# Patient Record
Sex: FEMALE | Race: WHITE | NOT HISPANIC OR LATINO | Employment: OTHER | URBAN - METROPOLITAN AREA
[De-identification: names, ages, dates, MRNs, and addresses within clinical notes are randomized per-mention and may not be internally consistent; named-entity substitution may affect disease eponyms.]

---

## 2017-02-01 ENCOUNTER — HOSPITAL ENCOUNTER (OUTPATIENT)
Dept: ULTRASOUND IMAGING | Facility: HOSPITAL | Age: 82
Discharge: HOME | End: 2017-02-01
Admitting: FAMILY MEDICINE

## 2017-02-01 DIAGNOSIS — R74.01 ELEVATED TRANSAMINASE LEVEL: ICD-10-CM

## 2017-02-01 PROCEDURE — 76705 ECHO EXAM OF ABDOMEN: CPT

## 2017-02-07 RX ORDER — LISINOPRIL 40 MG/1
40 TABLET ORAL DAILY
Qty: 90 TABLET | Refills: 1 | Status: SHIPPED | OUTPATIENT
Start: 2017-02-07 | End: 2017-08-21 | Stop reason: SDUPTHER

## 2017-02-16 RX ORDER — TOLTERODINE TARTRATE 1 MG/1
TABLET, EXTENDED RELEASE ORAL
Qty: 60 TABLET | Refills: 0 | Status: SHIPPED | OUTPATIENT
Start: 2017-02-16 | End: 2018-04-09

## 2017-03-21 RX ORDER — OMEPRAZOLE 20 MG/1
20 CAPSULE, DELAYED RELEASE ORAL DAILY
Qty: 90 CAPSULE | Refills: 3 | Status: SHIPPED | OUTPATIENT
Start: 2017-03-21 | End: 2018-03-03 | Stop reason: SDUPTHER

## 2017-06-14 RX ORDER — TOLTERODINE TARTRATE 1 MG/1
TABLET, EXTENDED RELEASE ORAL
Qty: 60 TABLET | Refills: 0 | OUTPATIENT
Start: 2017-06-14

## 2017-06-21 RX ORDER — ATORVASTATIN CALCIUM 80 MG/1
TABLET, FILM COATED ORAL
Qty: 90 TABLET | Refills: 0 | Status: SHIPPED | OUTPATIENT
Start: 2017-06-21 | End: 2017-08-30 | Stop reason: SDUPTHER

## 2017-08-22 RX ORDER — LISINOPRIL 40 MG/1
TABLET ORAL
Qty: 30 TABLET | Refills: 0 | Status: SHIPPED | OUTPATIENT
Start: 2017-08-22 | End: 2017-08-30 | Stop reason: SDUPTHER

## 2017-08-22 RX ORDER — TOLTERODINE TARTRATE 1 MG/1
TABLET, EXTENDED RELEASE ORAL
Qty: 60 TABLET | Refills: 0 | Status: SHIPPED | OUTPATIENT
Start: 2017-08-22 | End: 2017-08-30 | Stop reason: SDUPTHER

## 2017-08-22 NOTE — TELEPHONE ENCOUNTER
PN that we can send in 1 month supply so she can have enough to last to her appointment.  I see that she has cancelled and no show several appointments.  She said she will be here for her next appointment.  Script sent for lisinopril and tolterodine

## 2017-08-30 ENCOUNTER — OFFICE VISIT (OUTPATIENT)
Dept: INTERNAL MEDICINE | Facility: CLINIC | Age: 82
End: 2017-08-30

## 2017-08-30 VITALS
HEART RATE: 57 BPM | SYSTOLIC BLOOD PRESSURE: 136 MMHG | HEIGHT: 62 IN | WEIGHT: 147.2 LBS | BODY MASS INDEX: 27.09 KG/M2 | TEMPERATURE: 96.6 F | OXYGEN SATURATION: 95 % | DIASTOLIC BLOOD PRESSURE: 74 MMHG

## 2017-08-30 DIAGNOSIS — R39.11 HESITANCY OF MICTURITION: ICD-10-CM

## 2017-08-30 DIAGNOSIS — N30.01 ACUTE CYSTITIS WITH HEMATURIA: ICD-10-CM

## 2017-08-30 DIAGNOSIS — R82.90 ABNORMAL URINE: ICD-10-CM

## 2017-08-30 DIAGNOSIS — I10 BENIGN ESSENTIAL HTN: ICD-10-CM

## 2017-08-30 DIAGNOSIS — R73.09 ABNORMAL GLUCOSE: ICD-10-CM

## 2017-08-30 DIAGNOSIS — E78.5 HYPERLIPIDEMIA, UNSPECIFIED HYPERLIPIDEMIA TYPE: Primary | ICD-10-CM

## 2017-08-30 LAB
ALBUMIN SERPL-MCNC: 3.9 G/DL (ref 3.2–4.8)
ALBUMIN/GLOB SERPL: 1.6 G/DL (ref 1.5–2.5)
ALP SERPL-CCNC: 146 U/L (ref 25–100)
ALT SERPL W P-5'-P-CCNC: 23 U/L (ref 7–40)
ANION GAP SERPL CALCULATED.3IONS-SCNC: 15 MMOL/L (ref 3–11)
ARTICHOKE IGE QN: 76 MG/DL (ref 0–130)
AST SERPL-CCNC: 34 U/L (ref 0–33)
BILIRUB BLD-MCNC: NEGATIVE MG/DL
BILIRUB SERPL-MCNC: 0.2 MG/DL (ref 0.3–1.2)
BUN BLD-MCNC: 15 MG/DL (ref 9–23)
BUN/CREAT SERPL: 15 (ref 7–25)
CALCIUM SPEC-SCNC: 8.9 MG/DL (ref 8.7–10.4)
CHLORIDE SERPL-SCNC: 109 MMOL/L (ref 99–109)
CHOLEST SERPL-MCNC: 171 MG/DL (ref 0–200)
CLARITY, POC: ABNORMAL
CO2 SERPL-SCNC: 18 MMOL/L (ref 20–31)
COLOR UR: YELLOW
CREAT BLD-MCNC: 1 MG/DL (ref 0.6–1.3)
GFR SERPL CREATININE-BSD FRML MDRD: 53 ML/MIN/1.73
GLOBULIN UR ELPH-MCNC: 2.5 GM/DL
GLUCOSE BLD-MCNC: 92 MG/DL (ref 70–100)
GLUCOSE UR STRIP-MCNC: NEGATIVE MG/DL
HBA1C MFR BLD: 5.7 % (ref 4.8–5.6)
HDLC SERPL-MCNC: 80 MG/DL (ref 40–60)
KETONES UR QL: NEGATIVE
LEUKOCYTE EST, POC: ABNORMAL
NITRITE UR-MCNC: NEGATIVE MG/ML
PH UR: 6 [PH] (ref 5–8)
POTASSIUM BLD-SCNC: 4.1 MMOL/L (ref 3.5–5.5)
PROT SERPL-MCNC: 6.4 G/DL (ref 5.7–8.2)
PROT UR STRIP-MCNC: ABNORMAL MG/DL
RBC # UR STRIP: ABNORMAL /UL
SODIUM BLD-SCNC: 142 MMOL/L (ref 132–146)
SP GR UR: 1.02 (ref 1–1.03)
TRIGL SERPL-MCNC: 85 MG/DL (ref 0–150)
UROBILINOGEN UR QL: NORMAL

## 2017-08-30 PROCEDURE — 87086 URINE CULTURE/COLONY COUNT: CPT | Performed by: FAMILY MEDICINE

## 2017-08-30 PROCEDURE — 87186 SC STD MICRODIL/AGAR DIL: CPT | Performed by: FAMILY MEDICINE

## 2017-08-30 PROCEDURE — 80061 LIPID PANEL: CPT | Performed by: FAMILY MEDICINE

## 2017-08-30 PROCEDURE — 99214 OFFICE O/P EST MOD 30 MIN: CPT | Performed by: FAMILY MEDICINE

## 2017-08-30 PROCEDURE — 87077 CULTURE AEROBIC IDENTIFY: CPT | Performed by: FAMILY MEDICINE

## 2017-08-30 PROCEDURE — 83036 HEMOGLOBIN GLYCOSYLATED A1C: CPT | Performed by: FAMILY MEDICINE

## 2017-08-30 PROCEDURE — 81003 URINALYSIS AUTO W/O SCOPE: CPT | Performed by: FAMILY MEDICINE

## 2017-08-30 PROCEDURE — 80053 COMPREHEN METABOLIC PANEL: CPT | Performed by: FAMILY MEDICINE

## 2017-08-30 RX ORDER — DOXYCYCLINE 100 MG/1
100 CAPSULE ORAL 2 TIMES DAILY
Qty: 20 CAPSULE | Refills: 0 | Status: SHIPPED | OUTPATIENT
Start: 2017-08-30 | End: 2018-04-09

## 2017-08-30 RX ORDER — LISINOPRIL 40 MG/1
40 TABLET ORAL DAILY
Qty: 90 TABLET | Refills: 1 | Status: SHIPPED | OUTPATIENT
Start: 2017-08-30 | End: 2018-04-09 | Stop reason: SDUPTHER

## 2017-08-30 RX ORDER — ATORVASTATIN CALCIUM 80 MG/1
80 TABLET, FILM COATED ORAL NIGHTLY
Qty: 90 TABLET | Refills: 0 | Status: SHIPPED | OUTPATIENT
Start: 2017-08-30 | End: 2018-02-06 | Stop reason: SDUPTHER

## 2017-08-30 RX ORDER — HYDROCODONE BITARTRATE AND ACETAMINOPHEN 10; 325 MG/1; MG/1
TABLET ORAL
Refills: 0 | COMMUNITY
Start: 2017-08-20 | End: 2021-08-18

## 2017-08-30 RX ORDER — PHENAZOPYRIDINE HYDROCHLORIDE 100 MG/1
100 TABLET, FILM COATED ORAL AS NEEDED
COMMUNITY
Start: 2017-04-28 | End: 2018-04-09

## 2017-08-30 NOTE — PATIENT INSTRUCTIONS
Botulinum Toxin Bladder Injection  A botulinum toxin bladder injection is a procedure to treat an overactive bladder. During the procedure, a drug called botulinum toxin is injected into the bladder through a long, thin needle. This drug relaxes the bladder muscles and reduces overactivity.  You may need this procedure if your medicines are not working or you cannot take them. The procedure may be repeated as needed.  LET YOUR HEALTH CARE PROVIDER KNOW ABOUT:  · Any allergies you have.  · All medicines you are taking, including vitamins, herbs, eye drops, creams, and over-the-counter medicines.  · Previous problems you or members of your family have had with the use of anesthetics.  · Any blood disorders you have.  · Previous surgeries you have had.  · Other medical conditions you have.  · Any previous reactions to a botulinum toxin injection.  · Any symptoms of urinary tract infection. These include chills, fever, a burning feeling when passing urine, and needing to pass urine often.  RISKS AND COMPLICATIONS  Generally this is a safe procedure. However, problems may occur, including:  · Not being able to pass urine. If this happens, you may need to have your bladder emptied with a thin tube (bladder catheter).  · Bleeding.  · Urinary tract infection.  · Allergic reaction to the botulinum toxin.  · Pain or burning when passing urine.  · Damage to other structures or organs.  BEFORE THE PROCEDURE  · Ask your health care provider about:    Changing or stopping your regular medicines. This is especially important if you are taking diabetes medicines or blood thinners.    Taking medicines such as aspirin and ibuprofen. These medicines can thin your blood. Do not take these medicines before your procedure if your health care provider instructs you not to.  · Follow instructions from your health care provider about eating or drinking restrictions.  · You may be given antibiotic medicine to help prevent infection.  · Plan  to have someone take you home after the procedure.  PROCEDURE  · You will be asked to empty your bladder.  · To reduce your risk of infection:    Your health care team will wash or sanitize their hands.    Your skin will be washed with soap.  · An IV tube will be inserted into one of your veins.  · You will be given one or more of the following:    A medicine to help you relax (sedative).    A medicine to numb the area (local anesthetic).    A medicine to make you fall asleep (general anesthetic).  · A long, thin scope called a cystoscope will be passed into your bladder through the tube that carries urine from your bladder (urethra).  · The cystoscope will be used to fill your bladder with water.  · A long needle will be passed through the cystoscope and into the bladder.  · The botulinum toxin will be injected into your bladder. It may be injected into multiple areas of your bladder.  · Your bladder will be emptied, and the cystoscope will be removed.  The procedure may vary among health care providers and hospitals.  AFTER THE PROCEDURE  · Your blood pressure, heart rate, breathing rate, and blood oxygen level will be monitored often until the medicines you were given have worn off.  · Do not drive for 24 hours if you received a sedative.     This information is not intended to replace advice given to you by your health care provider. Make sure you discuss any questions you have with your health care provider.     Document Released: 09/07/2016 Document Reviewed: 09/07/2016  [employer] Interactive Patient Education ©2017 [employer]  Conjugated Estrogens vaginal cream  What is this medicine?  CONJUGATED ESTROGENS (CON ju gate ed CYNDY noel) are a mixture of female hormones. This cream can help relieve symptoms associated with menopause.like vaginal dryness and irritation.  This medicine may be used for other purposes; ask your health care provider or pharmacist if you have questions.  COMMON BRAND NAME(S):  Premarin  What should I tell my health care provider before I take this medicine?  They need to know if you have any of these conditions:  -abnormal vaginal bleeding  -blood vessel disease or blood clots  -breast, cervical, endometrial, or uterine cancer  -dementia  -diabetes  -gallbladder disease  -heart disease or recent heart attack  -high blood pressure  -high cholesterol  -high level of calcium in the blood  -hysterectomy  -kidney disease  -liver disease  -migraine headaches  -protein C deficiency  -protein S deficiency  -stroke  -systemic lupus erythematosus (SLE)  -tobacco smoker  -an unusual or allergic reaction to estrogens other medicines, foods, dyes, or preservatives  -pregnant or trying to get pregnant  -breast-feeding  How should I use this medicine?  This medicine is for use in the vagina only. Do not take by mouth. Follow the directions on the prescription label. Use at bedtime unless otherwise directed by your doctor or health care professional. Use the special applicator supplied with the cream. Wash hands before and after use. Fill the applicator with the cream and remove from the tube. Lie on your back, part and bend your knees. Insert the applicator into the vagina and push the plunger to expel the cream into the vagina. Wash the applicator with warm soapy water and rinse well. Use exactly as directed for the complete length of time prescribed. Do not stop using except on the advice of your doctor or health care professional.  Talk to your pediatrician regarding the use of this medicine in children. Special care may be needed.  A patient package insert for the product will be given with each prescription and refill. Read this sheet carefully each time. The sheet may change frequently.  Overdosage: If you think you have taken too much of this medicine contact a poison control center or emergency room at once.  NOTE: This medicine is only for you. Do not share this medicine with others.  What if I  miss a dose?  If you miss a dose, use it as soon as you can. If it is almost time for your next dose, use only that dose. Do not use double or extra doses.  What may interact with this medicine?  Do not take this medicine with any of the following medications:  -aromatase inhibitors like aminoglutethimide, anastrozole, exemestane, letrozole, testolactone  This medicine may also interact with the following medications:  -barbiturates used for inducing sleep or treating seizures  -carbamazepine  -grapefruit juice  -medicines for fungal infections like itraconazole and ketoconazole  -raloxifene or tamoxifen  -rifabutin  -rifampin  -rifapentine  -ritonavir  -some antibiotics used to treat infections  -Airport's Wort  -warfarin  This list may not describe all possible interactions. Give your health care provider a list of all the medicines, herbs, non-prescription drugs, or dietary supplements you use. Also tell them if you smoke, drink alcohol, or use illegal drugs. Some items may interact with your medicine.  What should I watch for while using this medicine?  Visit your health care professional for regular checks on your progress. You will need a regular breast and pelvic exam. You should also discuss the need for regular mammograms with your health care professional, and follow his or her guidelines.  This medicine can make your body retain fluid, making your fingers, hands, or ankles swell. Your blood pressure can go up. Contact your doctor or health care professional if you feel you are retaining fluid.  If you have any reason to think you are pregnant; stop taking this medicine at once and contact your doctor or health care professional.  Tobacco smoking increases the risk of getting a blood clot or having a stroke, especially if you are more than 35 years old. You are strongly advised not to smoke.  If you wear contact lenses and notice visual changes, or if the lenses begin to feel uncomfortable, consult your  eye care specialist.  If you are going to have elective surgery, you may need to stop taking this medicine beforehand. Consult your health care professional for advice prior to scheduling the surgery.  What side effects may I notice from receiving this medicine?  Side effects that you should report to your doctor or health care professional as soon as possible:  -allergic reactions like skin rash, itching or hives, swelling of the face, lips, or tongue  -breast tissue changes or discharge  -changes in vision  -chest pain  -confusion, trouble speaking or understanding  -dark urine  -general ill feeling or flu-like symptoms  -light-colored stools  -nausea, vomiting  -pain, swelling, warmth in the leg  -right upper belly pain  -severe headaches  -shortness of breath  -sudden numbness or weakness of the face, arm or leg  -trouble walking, dizziness, loss of balance or coordination  -unusual vaginal bleeding  -yellowing of the eyes or skin  Side effects that usually do not require medical attention (report to your doctor or health care professional if they continue or are bothersome):  -hair loss  -increased hunger or thirst  -increased urination  -symptoms of vaginal infection like itching, irritation or unusual discharge  -unusually weak or tired  This list may not describe all possible side effects. Call your doctor for medical advice about side effects. You may report side effects to FDA at 8-450-FDA-8034.  Where should I keep my medicine?  Keep out of the reach of children.  Store at room temperature between 15 and 30 degrees C (59 and 86 degrees F). Throw away any unused medicine after the expiration date.  NOTE: This sheet is a summary. It may not cover all possible information. If you have questions about this medicine, talk to your doctor, pharmacist, or health care provider.     © 2017, [employer]. (2012-03-21 09:20:36)    Urinary Tract Infection, Adult  A urinary tract infection (UTI) is an  infection of any part of the urinary tract, which includes the kidneys, ureters, bladder, and urethra. These organs make, store, and get rid of urine in the body. UTI can be a bladder infection (cystitis) or kidney infection (pyelonephritis).  CAUSES  This infection may be caused by fungi, viruses, or bacteria. Bacteria are the most common cause of UTIs. This condition can also be caused by repeated incomplete emptying of the bladder during urination.   RISK FACTORS  This condition is more likely to develop if:   · You ignore your need to urinate or hold urine for long periods of time.  · You do not empty your bladder completely during urination.  · You wipe back to front after urinating or having a bowel movement, if you are female.  · You are uncircumcised, if you are male.    · You are constipated.  · You have a urinary catheter that stays in place (indwelling).  · You have a weak defense (immune) system.  · You have a medical condition that affects your bowels, kidneys, or bladder.  · You have diabetes.  · You take antibiotic medicines frequently or for long periods of time, and the antibiotics no longer work well against certain types of infections (antibiotic resistance).  · You take medicines that irritate your urinary tract.  · You are exposed to chemicals that irritate your urinary tract.  · You are female.  SYMPTOMS   Symptoms of this condition include:   · Fever.    · Frequent urination or passing small amounts of urine frequently.    · Needing to urinate urgently.    · Pain or burning with urination.    · Urine that smells bad or unusual.    · Cloudy urine.    · Pain in the lower abdomen or back.    · Trouble urinating.    · Blood in the urine.    · Vomiting or being less hungry than normal.     · Diarrhea or abdominal pain.    · Vaginal discharge, if you are female.  DIAGNOSIS  This condition is diagnosed with a medical history and physical exam. You will also need to provide a urine sample to test your  urine. Other tests may be done, including:    · Blood tests.    · Sexually transmitted disease (STD) testing.     If you have had more than one UTI, a cystoscopy or imaging studies may be done to determine the cause of the infections.   TREATMENT   Treatment for this condition often includes a combination of two or more of the following:   · Antibiotic medicine.    · Other medicines to treat less common causes of UTI.    · Over-the-counter medicines to treat pain.    · Drinking enough water to stay hydrated.  HOME CARE INSTRUCTIONS  · Take over-the-counter and prescription medicines only as told by your health care provider.  · If you were prescribed an antibiotic, take it as told by your health care provider. Do not stop taking the antibiotic even if you start to feel better.  · Avoid alcohol, caffeine, tea, and carbonated beverages. They can irritate your bladder.  · Drink enough fluid to keep your urine clear or pale yellow.  · Keep all follow-up visits as told by your health care provider. This is important.  · Make sure to:    Empty your bladder often and completely. Do not hold urine for long periods of time.    Empty your bladder before and after sex.    Wipe from front to back after a bowel movement if you are female. Use each tissue one time when you wipe.  SEEK MEDICAL CARE IF:   · You have back pain.    · You have a fever.  · You feel nauseous or vomit.    · Your symptoms do not get better after 3 days.     · Your symptoms go away and then return.  SEEK IMMEDIATE MEDICAL CARE IF:   · You have severe back pain or lower abdominal pain.    · You are vomiting and cannot keep down any medicines or water.     This information is not intended to replace advice given to you by your health care provider. Make sure you discuss any questions you have with your health care provider.     Document Released: 09/27/2006 Document Revised: 04/10/2017 Document Reviewed: 11/07/2016  [email or web address] Interactive Patient Education  ©2017 [employer]

## 2017-08-30 NOTE — PROGRESS NOTES
"Subjective   Sarah Zendejas is a 85 y.o. female.     Chief Complaint   Patient presents with   • follow up visit 6 months     had been to gyno/urology - last visit 1 month prior   • Urinary Incontinence     tried botox and didn't work; patient is very frustrated with it; She is still using cream and wants to know next step       Visit Vitals   • /74   • Pulse 57   • Temp 96.6 °F (35.9 °C)   • Ht 61.5\" (156.2 cm)   • Wt 147 lb 3.2 oz (66.8 kg)   • LMP  (LMP Unknown)   • SpO2 95%   • BMI 27.36 kg/m2       Hypertension   This is a chronic problem. The current episode started more than 1 year ago. The problem is unchanged. The problem is controlled (pt gets nervous going to Specialist and BP will go to 200. ). Pertinent negatives include no anxiety, blurred vision, chest pain, headaches, malaise/fatigue, neck pain, orthopnea, palpitations, peripheral edema, PND, shortness of breath or sweats. Past treatments include ACE inhibitors. The current treatment provides significant improvement. There are no compliance problems.  There is no history of angina, kidney disease, CAD/MI, CVA, heart failure, left ventricular hypertrophy, PVD, retinopathy or a thyroid problem. There is no history of chronic renal disease or sleep apnea.   Hyperlipidemia   This is a chronic problem. The current episode started more than 1 year ago. The problem is controlled. Recent lipid tests were reviewed and are normal. Exacerbating diseases include liver disease. She has no history of chronic renal disease, diabetes, hypothyroidism, obesity or nephrotic syndrome. There are no known factors aggravating her hyperlipidemia. Associated symptoms include leg pain. Pertinent negatives include no chest pain, focal sensory loss, focal weakness, myalgias or shortness of breath. Current antihyperlipidemic treatment includes statins. The current treatment provides significant improvement of lipids. Risk factors for coronary artery disease include " dyslipidemia, hypertension and post-menopausal.      Pt had botox in Urethra and cysto 6 months ago. Pt still having problems relaxing urethra.  Pt has questions about premarin.     The following portions of the patient's history were reviewed and updated as appropriate: allergies, current medications, past family history, past medical history, past social history, past surgical history and problem list.    Past Medical History:   Diagnosis Date   • Allergic rhinitis    • Anemia    • Bipolar 1 disorder    • Broken bones     cheek bone   • Bursitis of knee    • Calculus of bile duct    • Chronic kidney disease     STAGE 3   • Colon polyps    • Depression    • Dysuria    • Fundic gland polyposis of stomach 08/2014   • Gallstones 07/2014    gb sono-stone 7/14   • GERD (gastroesophageal reflux disease)    • H/O complete eye exam 2015   • H/O mammogram 2013   • History of dental examination 06/2016   • Hyperlipidemia    • Hypertension    • Infectious viral hepatitis    • Non-alcoholic fatty liver disease    • Polyp of stomach    • Spinal stenosis    • UTI (urinary tract infection)        Past Surgical History:   Procedure Laterality Date   • BILATERAL BREAST REDUCTION     • CARDIAC CATHETERIZATION     • COLON SURGERY  08/2014    TUBULAR ADENOMA RESECTED   • COLONOSCOPY  2013   • ENDOSCOPY  08/2014    (Dr Huffman)   • ESOPHAGEAL DILATATION     • OTHER SURGICAL HISTORY  08/2014    bx stomach polyps and colon polyps       Allergies   Allergen Reactions   • Morphine Other (See Comments)     Mood changes   • Nsaids    • Sulfa Antibiotics    • Vancomycin Rash       Family History   Problem Relation Age of Onset   • Colon cancer Mother    • Cancer Mother    • Stroke Father    • Arthritis Father    • Hypertension Father    • Ovarian cancer Sister    • Heart disease Sister    • Cancer Sister    • Diabetes Brother    • Arthritis Brother    • Hypertension Brother    • Colon cancer Sister    • Cancer Sister    • Adrenal disorder  Other    • Breast cancer Other    • Brain cancer Other        Social History     Social History   • Marital status: Single     Spouse name: N/A   • Number of children: N/A   • Years of education: N/A     Occupational History   • Not on file.     Social History Main Topics   • Smoking status: Former Smoker   • Smokeless tobacco: Never Used   • Alcohol use No   • Drug use: No   • Sexual activity: Not on file     Other Topics Concern   • Not on file     Social History Narrative       Review of Systems   Constitutional: Negative.  Negative for chills, diaphoresis, fatigue, fever and malaise/fatigue.   HENT: Negative.  Negative for ear pain, nosebleeds, postnasal drip, rhinorrhea, sinus pressure, sneezing and sore throat.    Eyes: Negative.  Negative for blurred vision, redness and itching.   Respiratory: Negative.  Negative for cough, shortness of breath and wheezing.    Cardiovascular: Negative.  Negative for chest pain, palpitations, orthopnea and PND.   Gastrointestinal: Negative.  Negative for abdominal pain, constipation, diarrhea, nausea and vomiting.   Endocrine: Positive for polydipsia. Negative for cold intolerance and heat intolerance.   Genitourinary: Positive for difficulty urinating and enuresis. Negative for dysuria, frequency, hematuria and urgency.   Musculoskeletal: Negative.  Negative for arthralgias, back pain, myalgias and neck pain.   Skin: Negative.  Negative for color change and rash.   Allergic/Immunologic: Negative.  Negative for environmental allergies.   Neurological: Negative.  Negative for dizziness, focal weakness, syncope, light-headedness and headaches.   Hematological: Negative.  Negative for adenopathy. Does not bruise/bleed easily.   Psychiatric/Behavioral: Negative.  Negative for dysphoric mood. The patient is not nervous/anxious.        Objective   Physical Exam   Constitutional: She is oriented to person, place, and time. She appears well-developed.   HENT:   Head: Normocephalic.    Right Ear: External ear normal.   Left Ear: External ear normal.   Nose: Nose normal.   Mouth/Throat: Oropharynx is clear and moist.   Eyes: Conjunctivae and EOM are normal. Pupils are equal, round, and reactive to light.   Neck: Normal range of motion. Neck supple.   Cardiovascular: Normal rate and regular rhythm.    No murmur heard.  Pulmonary/Chest: Effort normal and breath sounds normal. No respiratory distress. She has no wheezes. She has no rales. She exhibits no tenderness.   Abdominal: Soft. Bowel sounds are normal. There is no tenderness.   Musculoskeletal: Normal range of motion.   Neurological: She is alert and oriented to person, place, and time.   Skin: Skin is warm and dry.   Psychiatric: She has a normal mood and affect. Her behavior is normal.   Nursing note and vitals reviewed.      Assessment/Plan   Sarah was seen today for follow up visit 6 months and urinary incontinence.    Diagnoses and all orders for this visit:    Hyperlipidemia, unspecified hyperlipidemia type  -     atorvastatin (LIPITOR) 80 MG tablet; Take 1 tablet by mouth Every Night.  -     Comprehensive Metabolic Panel  -     Lipid Panel    Benign essential HTN  -     lisinopril (PRINIVIL,ZESTRIL) 40 MG tablet; Take 1 tablet by mouth Daily.  -     Comprehensive Metabolic Panel  -     Lipid Panel    Abnormal urine  -     POC Urinalysis Dipstick, Automated  -     Urine Culture    Hesitancy of micturition    Abnormal glucose  -     Hemoglobin A1c    Acute cystitis with hematuria  -     Urinalysis w/Culture if Indicated; Future    Other orders  -     doxycycline (MONODOX) 100 MG capsule; Take 1 capsule by mouth 2 (Two) Times a Day.      Recheck urine 2 weeks or sooner as needed  Order for repeat urine given to pt that she can due in the [place] area  Handout on bladder botox and premarin topical use.            Current Outpatient Prescriptions:   •  ALPRAZolam (XANAX) 1 MG tablet, Take 1 mg by mouth 2 (two) times a day as needed for  anxiety., Disp: , Rfl:   •  atorvastatin (LIPITOR) 80 MG tablet, Take 1 tablet by mouth Every Night., Disp: 90 tablet, Rfl: 0  •  calcitriol (ROCALTROL) 0.25 MCG capsule, TK 1 C PO D, Disp: , Rfl: 0  •  Calcium Carbonate (CALCIUM 600 PO), Take  by mouth 2 (Two) Times a Day., Disp: , Rfl:   •  cloNIDine (CATAPRES) 0.1 MG tablet, Take 0.1 mg by mouth 2 (two) times a day., Disp: , Rfl:   •  conjugated estrogens (PREMARIN) 0.625 MG/GM vaginal cream, Insert  into the vagina Daily., Disp: , Rfl:   •  CRANBERRY PO, Take  by mouth., Disp: , Rfl:   •  Ergocalciferol (VITAMIN D2 PO), Take  by mouth., Disp: , Rfl:   •  FLUoxetine (PROzac) 40 MG capsule, Take 40 mg by mouth daily., Disp: , Rfl:   •  fluticasone (FLONASE) 50 MCG/ACT nasal spray, 2 sprays into each nostril daily. Administer 2 sprays in each nostril for each dose., Disp: , Rfl:   •  HYDROcodone-acetaminophen (NORCO)  MG per tablet, TK 1/2 TO 1 T PO BID PRN P, Disp: , Rfl: 0  •  lisinopril (PRINIVIL,ZESTRIL) 40 MG tablet, Take 1 tablet by mouth Daily., Disp: 90 tablet, Rfl: 1  •  omeprazole (priLOSEC) 20 MG capsule, Take 1 capsule by mouth Daily., Disp: 90 capsule, Rfl: 3  •  phenazopyridine (PYRIDIUM) 100 MG tablet, Take 100 mg by mouth As Needed. For UTI, Disp: , Rfl:   •  tolterodine (DETROL) 1 MG tablet, TAKE 1 TABLET BY MOUTH TWICE DAILY, Disp: 60 tablet, Rfl: 0  •  topiramate (TOPAMAX) 50 MG tablet, Take 50 mg by mouth Daily. 3 tablets at night times, Disp: , Rfl:   •  vitamin D (ERGOCALCIFEROL) 17577 UNITS capsule capsule, TK ONE C PO  Q MONTH, Disp: , Rfl: 0  •  doxycycline (MONODOX) 100 MG capsule, Take 1 capsule by mouth 2 (Two) Times a Day., Disp: 20 capsule, Rfl: 0    Return in about 6 months (around 2/28/2018), or if symptoms worsen or fail to improve, for Recheck.    Recent Results (from the past 168 hour(s))   POC Urinalysis Dipstick, Automated    Collection Time: 08/30/17  4:41 PM   Result Value Ref Range    Color Yellow Yellow, Straw, Dark  Yellow, Alia    Clarity, UA Cloudy (A) Clear    Glucose, UA Negative Negative, 1000 mg/dL (3+) mg/dL    Bilirubin Negative Negative    Ketones, UA Negative Negative    Specific Gravity  1.020 1.005 - 1.030    Blood, UA 3+ (A) Negative    pH, Urine 6.0 5.0 - 8.0    Protein, POC 2+ (A) Negative mg/dL    Urobilinogen, UA Normal Normal    Leukocytes 500 Edmund/ul (A) Negative    Nitrite, UA Negative Negative

## 2017-09-01 RX ORDER — LEVOFLOXACIN 500 MG/1
500 TABLET, FILM COATED ORAL DAILY
Qty: 7 TABLET | Refills: 0 | Status: SHIPPED | OUTPATIENT
Start: 2017-09-01 | End: 2018-04-09

## 2017-09-13 LAB — BACTERIA SPEC AEROBE CULT: ABNORMAL

## 2017-09-18 ENCOUNTER — TELEPHONE (OUTPATIENT)
Dept: INTERNAL MEDICINE | Facility: CLINIC | Age: 82
End: 2017-09-18

## 2017-09-19 RX ORDER — AMOXICILLIN AND CLAVULANATE POTASSIUM 875; 125 MG/1; MG/1
1 TABLET, FILM COATED ORAL 2 TIMES DAILY
Qty: 20 TABLET | Refills: 0 | Status: SHIPPED | OUTPATIENT
Start: 2017-09-19 | End: 2018-04-09

## 2017-09-21 LAB
APPEARANCE UR: ABNORMAL
BACTERIA #/AREA URNS HPF: ABNORMAL /[HPF]
BACTERIA UR CULT: NORMAL
BACTERIA UR CULT: NORMAL
BILIRUB UR QL STRIP: NEGATIVE
CASTS URNS MICRO: ABNORMAL
CASTS URNS QL MICRO: PRESENT /LPF
COLOR UR: YELLOW
EPI CELLS #/AREA URNS HPF: ABNORMAL /HPF
GLUCOSE UR QL: NEGATIVE
HGB UR QL STRIP: ABNORMAL
KETONES UR QL STRIP: NEGATIVE
LEUKOCYTE ESTERASE UR QL STRIP: ABNORMAL
MICRO URNS: ABNORMAL
NITRITE UR QL STRIP: NEGATIVE
PH UR STRIP: 6 [PH] (ref 5–7.5)
PROT UR QL STRIP: ABNORMAL
RBC #/AREA URNS HPF: ABNORMAL /HPF
SP GR UR: 1.01 (ref 1–1.03)
URINALYSIS REFLEX: ABNORMAL
UROBILINOGEN UR STRIP-MCNC: 0.2 MG/DL (ref 0.2–1)
WBC #/AREA URNS HPF: >30 /HPF

## 2017-09-22 NOTE — PROGRESS NOTES
Please call the patient regarding her abnormal result. Pt has blood and white cells and casts in your urine. Culture was genital contamination. Does she have a Nephrologist? If not let us know for referral.

## 2017-09-26 ENCOUNTER — TELEPHONE (OUTPATIENT)
Dept: INTERNAL MEDICINE | Facility: CLINIC | Age: 82
End: 2017-09-26

## 2017-09-26 DIAGNOSIS — N18.30 CKD (CHRONIC KIDNEY DISEASE), STAGE III (HCC): Primary | ICD-10-CM

## 2017-09-26 DIAGNOSIS — R82.998 CELLS AND CASTS IN URINE: ICD-10-CM

## 2017-09-26 NOTE — TELEPHONE ENCOUNTER
----- Message from Cait RADER MD sent at 9/22/2017  7:36 AM EDT -----  Please call the patient regarding her abnormal result. Pt has blood and white cells and casts in your urine. Culture was genital contamination. Does she have a Nephrologist? If not let us know for referral.

## 2018-02-06 DIAGNOSIS — E78.5 HYPERLIPIDEMIA, UNSPECIFIED HYPERLIPIDEMIA TYPE: ICD-10-CM

## 2018-02-06 RX ORDER — ATORVASTATIN CALCIUM 80 MG/1
TABLET, FILM COATED ORAL
Qty: 90 TABLET | Refills: 0 | Status: SHIPPED | OUTPATIENT
Start: 2018-02-06 | End: 2018-04-09 | Stop reason: SDUPTHER

## 2018-02-19 RX ORDER — TOLTERODINE TARTRATE 1 MG/1
TABLET, EXTENDED RELEASE ORAL
Qty: 60 TABLET | Refills: 0 | Status: SHIPPED | OUTPATIENT
Start: 2018-02-19 | End: 2018-04-09 | Stop reason: SDUPTHER

## 2018-03-05 RX ORDER — OMEPRAZOLE 20 MG/1
CAPSULE, DELAYED RELEASE ORAL
Qty: 90 CAPSULE | Refills: 0 | Status: SHIPPED | OUTPATIENT
Start: 2018-03-05 | End: 2018-07-03 | Stop reason: SDUPTHER

## 2018-04-09 ENCOUNTER — OFFICE VISIT (OUTPATIENT)
Dept: INTERNAL MEDICINE | Facility: CLINIC | Age: 83
End: 2018-04-09

## 2018-04-09 VITALS
DIASTOLIC BLOOD PRESSURE: 82 MMHG | OXYGEN SATURATION: 98 % | HEART RATE: 61 BPM | TEMPERATURE: 97.3 F | SYSTOLIC BLOOD PRESSURE: 142 MMHG | WEIGHT: 146.8 LBS | BODY MASS INDEX: 23.04 KG/M2 | HEIGHT: 67 IN

## 2018-04-09 DIAGNOSIS — R73.09 ABNORMAL GLUCOSE: ICD-10-CM

## 2018-04-09 DIAGNOSIS — I10 BENIGN ESSENTIAL HTN: Primary | ICD-10-CM

## 2018-04-09 DIAGNOSIS — E78.5 HYPERLIPIDEMIA, UNSPECIFIED HYPERLIPIDEMIA TYPE: ICD-10-CM

## 2018-04-09 DIAGNOSIS — R30.0 DYSURIA: ICD-10-CM

## 2018-04-09 LAB
ALBUMIN SERPL-MCNC: 3.9 G/DL (ref 3.2–4.8)
ALBUMIN/GLOB SERPL: 1.7 G/DL (ref 1.5–2.5)
ALP SERPL-CCNC: 151 U/L (ref 25–100)
ALT SERPL W P-5'-P-CCNC: 22 U/L (ref 7–40)
ANION GAP SERPL CALCULATED.3IONS-SCNC: 8 MMOL/L (ref 3–11)
ARTICHOKE IGE QN: 81 MG/DL (ref 0–130)
AST SERPL-CCNC: 29 U/L (ref 0–33)
BILIRUB BLD-MCNC: NEGATIVE MG/DL
BILIRUB SERPL-MCNC: 0.3 MG/DL (ref 0.3–1.2)
BUN BLD-MCNC: 17 MG/DL (ref 9–23)
BUN/CREAT SERPL: 21.3 (ref 7–25)
CALCIUM SPEC-SCNC: 8.7 MG/DL (ref 8.7–10.4)
CHLORIDE SERPL-SCNC: 109 MMOL/L (ref 99–109)
CHOLEST SERPL-MCNC: 177 MG/DL (ref 0–200)
CLARITY, POC: CLEAR
CO2 SERPL-SCNC: 26 MMOL/L (ref 20–31)
COLOR UR: YELLOW
CREAT BLD-MCNC: 0.8 MG/DL (ref 0.6–1.3)
GFR SERPL CREATININE-BSD FRML MDRD: 68 ML/MIN/1.73
GLOBULIN UR ELPH-MCNC: 2.3 GM/DL
GLUCOSE BLD-MCNC: 96 MG/DL (ref 70–100)
GLUCOSE UR STRIP-MCNC: NEGATIVE MG/DL
HBA1C MFR BLD: 5.9 % (ref 4.8–5.6)
HDLC SERPL-MCNC: 80 MG/DL (ref 40–60)
KETONES UR QL: NEGATIVE
LEUKOCYTE EST, POC: NEGATIVE
NITRITE UR-MCNC: NEGATIVE MG/ML
PH UR: 7.5 [PH] (ref 5–8)
POTASSIUM BLD-SCNC: 4.8 MMOL/L (ref 3.5–5.5)
PROT SERPL-MCNC: 6.2 G/DL (ref 5.7–8.2)
PROT UR STRIP-MCNC: NEGATIVE MG/DL
RBC # UR STRIP: NEGATIVE /UL
SODIUM BLD-SCNC: 143 MMOL/L (ref 132–146)
SP GR UR: 1.01 (ref 1–1.03)
TRIGL SERPL-MCNC: 97 MG/DL (ref 0–150)
UROBILINOGEN UR QL: NORMAL

## 2018-04-09 PROCEDURE — 81003 URINALYSIS AUTO W/O SCOPE: CPT | Performed by: FAMILY MEDICINE

## 2018-04-09 PROCEDURE — 80061 LIPID PANEL: CPT | Performed by: FAMILY MEDICINE

## 2018-04-09 PROCEDURE — 83036 HEMOGLOBIN GLYCOSYLATED A1C: CPT | Performed by: FAMILY MEDICINE

## 2018-04-09 PROCEDURE — 87086 URINE CULTURE/COLONY COUNT: CPT | Performed by: FAMILY MEDICINE

## 2018-04-09 PROCEDURE — 99214 OFFICE O/P EST MOD 30 MIN: CPT | Performed by: FAMILY MEDICINE

## 2018-04-09 PROCEDURE — 80053 COMPREHEN METABOLIC PANEL: CPT | Performed by: FAMILY MEDICINE

## 2018-04-09 RX ORDER — LISINOPRIL 40 MG/1
40 TABLET ORAL DAILY
Qty: 90 TABLET | Refills: 1 | Status: SHIPPED | OUTPATIENT
Start: 2018-04-09 | End: 2021-08-18

## 2018-04-09 RX ORDER — AMLODIPINE BESYLATE 5 MG/1
2.5 TABLET ORAL DAILY
Qty: 30 TABLET | Refills: 3 | Status: SHIPPED | OUTPATIENT
Start: 2018-04-09 | End: 2018-05-16 | Stop reason: ALTCHOICE

## 2018-04-09 RX ORDER — ARIPIPRAZOLE 2 MG/1
1 TABLET ORAL DAILY
COMMUNITY
End: 2018-05-17 | Stop reason: SDUPTHER

## 2018-04-09 RX ORDER — ATORVASTATIN CALCIUM 80 MG/1
80 TABLET, FILM COATED ORAL NIGHTLY
Qty: 90 TABLET | Refills: 0 | Status: SHIPPED | OUTPATIENT
Start: 2018-04-09
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
## 2018-04-11 LAB
BACTERIA SPEC AEROBE CULT: NORMAL
BACTERIA SPEC AEROBE CULT: NORMAL

## 2018-04-12 RX ORDER — TOLTERODINE TARTRATE 1 MG/1
TABLET, EXTENDED RELEASE ORAL
Qty: 60 TABLET | Refills: 0 | Status: SHIPPED | OUTPATIENT
Start: 2018-04-12 | End: 2021-08-18

## 2018-05-01 ENCOUNTER — TELEPHONE (OUTPATIENT)
Dept: INTERNAL MEDICINE | Facility: CLINIC | Age: 83
End: 2018-05-01

## 2018-05-01 NOTE — TELEPHONE ENCOUNTER
Malinda called to say they needed a copy of med list faxed to [facility] [phone].  Fax and mailed copy to patient

## 2018-05-08 DIAGNOSIS — E78.5 HYPERLIPIDEMIA, UNSPECIFIED HYPERLIPIDEMIA TYPE: ICD-10-CM

## 2018-05-08 RX ORDER — ATORVASTATIN CALCIUM 80 MG/1
TABLET, FILM COATED ORAL
Qty: 90 TABLET | Refills: 0 | OUTPATIENT
Start: 2018-05-08

## 2018-05-15 ENCOUNTER — TELEPHONE (OUTPATIENT)
Dept: INTERNAL MEDICINE | Facility: CLINIC | Age: 83
End: 2018-05-15

## 2018-05-15 NOTE — TELEPHONE ENCOUNTER
I have called patient. No Answer. No Voicemail to leave a msg.    I spoke with Dr. Hopper on the phone and she wants patient to go to the UC or ER to have the swelling evaluated.     She said that she was fine to send in the refill for Abilify, but we need to clarify the dose and strength. We have on file Abilify 2mg, taking 1  Tablet by mouth daily.     Please try patient back in the morning.

## 2018-05-16 RX ORDER — AMLODIPINE BESYLATE 2.5 MG/1
2.5 TABLET ORAL DAILY
Qty: 30 TABLET | Refills: 2 | Status: SHIPPED | OUTPATIENT
Start: 2018-05-16 | End: 2021-08-18

## 2018-05-16 NOTE — TELEPHONE ENCOUNTER
I don't see a 1 mg abilify- does her pharmacy have that dose? If so, ok to call for 3 months.  New scrip sent for amlodipine  How is BP?

## 2018-05-16 NOTE — TELEPHONE ENCOUNTER
Malinda called on Sarah's behalf. Pt takes 1mg of Abilify so they would like the 1mg tablet so she does not have to cut in half.    Pt also has been taking 2.5mg of Amlodipine but gets 5mg tabs, pt would like the 2.5mg tab so they do not have to cut in half as well.

## 2018-05-16 NOTE — TELEPHONE ENCOUNTER
Pt nephew answered phone, verified he was on verbal release. Advised to have Sarah call office to verify Abilify dosage and that pt would need to go to UC or ER for swelling.

## 2018-05-17 ENCOUNTER — TELEPHONE (OUTPATIENT)
Dept: INTERNAL MEDICINE | Facility: CLINIC | Age: 83
End: 2018-05-17

## 2018-05-17 RX ORDER — ARIPIPRAZOLE 2 MG/1
1 TABLET ORAL DAILY
Qty: 45 TABLET | Refills: 0 | Status: SHIPPED | OUTPATIENT
Start: 2018-05-17 | End: 2021-08-18

## 2018-05-17 NOTE — TELEPHONE ENCOUNTER
Pharmacy does not have 1 mg dose, so sending in the 2 mg dose.  Called pt and advised amlodipine was sent in.  Stated BP is running 130's-140's over 80's-90's.

## 2018-05-17 NOTE — TELEPHONE ENCOUNTER
Kristie from [facility] called to verify dose of amlodipine.  Pt was getting 5 mg but had to cut them in half.  She recently called to have it sent in as 2.5 so she would not have to cut them.  They requested last office notes.  The facility is about 2 hours away and they wanted to see if Dr. Hopper would continue as her PCP.  Phone # for [facility] is [phone], fax #[phone]

## 2018-05-21 NOTE — TELEPHONE ENCOUNTER
Spoke with Kristie and she will speak to Ms. Zendejas about getting another physician.  That is what they had recommended to her also.

## 2018-07-05 RX ORDER — OMEPRAZOLE 20 MG/1
CAPSULE, DELAYED RELEASE ORAL
Qty: 90 CAPSULE | Refills: 0 | Status: SHIPPED | OUTPATIENT
Start: 2018-07-05 | End: 2018-10-04 | Stop reason: SDUPTHER

## 2018-10-04 RX ORDER — OMEPRAZOLE 20 MG/1
CAPSULE, DELAYED RELEASE ORAL
Qty: 90 CAPSULE | Refills: 0 | Status: SHIPPED | OUTPATIENT
Start: 2018-10-04 | End: 2019-02-06 | Stop reason: SDUPTHER

## 2018-10-20 DIAGNOSIS — I10 BENIGN ESSENTIAL HTN: ICD-10-CM

## 2018-10-22 RX ORDER — LISINOPRIL 40 MG/1
TABLET ORAL
Qty: 90 TABLET | Refills: 0 | OUTPATIENT
Start: 2018-10-22

## 2019-02-06 RX ORDER — OMEPRAZOLE 20 MG/1
CAPSULE, DELAYED RELEASE ORAL
Qty: 90 CAPSULE | Refills: 0 | Status: SHIPPED | OUTPATIENT
Start: 2019-02-06

## 2019-03-27 ENCOUNTER — LAB REQUISITION (OUTPATIENT)
Dept: LAB | Facility: HOSPITAL | Age: 84
End: 2019-03-27

## 2019-03-27 DIAGNOSIS — Z00.00 ROUTINE GENERAL MEDICAL EXAMINATION AT A HEALTH CARE FACILITY: ICD-10-CM

## 2019-03-27 LAB — NT-PROBNP SERPL-MCNC: 231 PG/ML (ref 5–1800)

## 2019-03-27 PROCEDURE — 83880 ASSAY OF NATRIURETIC PEPTIDE: CPT

## 2019-06-26 ENCOUNTER — TRANSCRIBE ORDERS (OUTPATIENT)
Dept: ADMINISTRATIVE | Facility: HOSPITAL | Age: 84
End: 2019-06-26

## 2019-06-26 ENCOUNTER — HOSPITAL ENCOUNTER (OUTPATIENT)
Dept: CARDIOLOGY | Facility: HOSPITAL | Age: 84
Discharge: HOME | End: 2019-06-26
Admitting: UROLOGY

## 2019-06-26 ENCOUNTER — LAB (OUTPATIENT)
Dept: LAB | Facility: HOSPITAL | Age: 84
End: 2019-06-26

## 2019-06-26 DIAGNOSIS — N39.41 URGE INCONTINENCE: Primary | ICD-10-CM

## 2019-06-26 DIAGNOSIS — N39.41 URGE INCONTINENCE: ICD-10-CM

## 2019-06-26 LAB
ANION GAP SERPL CALCULATED.3IONS-SCNC: 9 MMOL/L (ref 10–20)
BASOPHILS # BLD AUTO: 0 10*3/MM3 (ref 0–0.2)
BASOPHILS NFR BLD AUTO: 0.6 % (ref 0–1.5)
BUN BLD-MCNC: 39 MG/DL (ref 8–20)
BUN/CREAT SERPL: 26 (ref 5.4–26.2)
CALCIUM SPEC-SCNC: 9.4 MG/DL (ref 8.9–10.3)
CHLORIDE SERPL-SCNC: 108 MMOL/L (ref 101–111)
CO2 SERPL-SCNC: 24 MMOL/L (ref 22–32)
CREAT BLD-MCNC: 1.5 MG/DL (ref 0.4–1)
DEPRECATED RDW RBC AUTO: 52.9 FL (ref 37–54)
EOSINOPHIL # BLD AUTO: 0.2 10*3/MM3 (ref 0–0.4)
EOSINOPHIL NFR BLD AUTO: 2.3 % (ref 0.3–6.2)
ERYTHROCYTE [DISTWIDTH] IN BLOOD BY AUTOMATED COUNT: 15.7 % (ref 12.3–15.4)
GFR SERPL CREATININE-BSD FRML MDRD: 33 ML/MIN/1.73
GLUCOSE BLD-MCNC: 109 MG/DL (ref 65–99)
HCT VFR BLD AUTO: 37 % (ref 34–46.6)
HGB BLD-MCNC: 12.2 G/DL (ref 12–15.9)
LYMPHOCYTES # BLD AUTO: 2.2 10*3/MM3 (ref 0.7–3.1)
LYMPHOCYTES NFR BLD AUTO: 26.7 % (ref 19.6–45.3)
MCH RBC QN AUTO: 31.3 PG (ref 26.6–33)
MCHC RBC AUTO-ENTMCNC: 33 G/DL (ref 31.5–35.7)
MCV RBC AUTO: 94.8 FL (ref 79–97)
MONOCYTES # BLD AUTO: 1 10*3/MM3 (ref 0.1–0.9)
MONOCYTES NFR BLD AUTO: 12.2 % (ref 5–12)
NEUTROPHILS # BLD AUTO: 4.7 10*3/MM3 (ref 1.7–7)
NEUTROPHILS NFR BLD AUTO: 58.2 % (ref 42.7–76)
NRBC BLD AUTO-RTO: 0.1 /100 WBC (ref 0–0.2)
PLATELET # BLD AUTO: 281 10*3/MM3 (ref 140–450)
PMV BLD AUTO: 9.5 FL (ref 6–12)
POTASSIUM BLD-SCNC: 4.9 MMOL/L (ref 3.6–5.1)
RBC # BLD AUTO: 3.9 10*6/MM3 (ref 3.77–5.28)
SODIUM BLD-SCNC: 141 MMOL/L (ref 136–144)
WBC NRBC COR # BLD: 8.1 10*3/MM3 (ref 3.4–10.8)

## 2019-06-26 PROCEDURE — 36415 COLL VENOUS BLD VENIPUNCTURE: CPT

## 2019-06-26 PROCEDURE — 93005 ELECTROCARDIOGRAM TRACING: CPT | Performed by: UROLOGY

## 2019-06-26 PROCEDURE — 80048 BASIC METABOLIC PNL TOTAL CA: CPT

## 2019-06-26 PROCEDURE — 85025 COMPLETE CBC W/AUTO DIFF WBC: CPT

## 2019-06-26 PROCEDURE — 93010 ELECTROCARDIOGRAM REPORT: CPT | Performed by: INTERNAL MEDICINE

## 2021-07-23 ENCOUNTER — APPOINTMENT (OUTPATIENT)
Dept: CT IMAGING | Facility: HOSPITAL | Age: 86
End: 2021-07-23

## 2021-07-23 ENCOUNTER — HOSPITAL ENCOUNTER (EMERGENCY)
Facility: HOSPITAL | Age: 86
Discharge: NURSING, REHAB OR LONG-TERM CARE | End: 2021-07-23
Attending: EMERGENCY MEDICINE | Admitting: EMERGENCY MEDICINE

## 2021-07-23 VITALS
TEMPERATURE: 98.2 F | HEIGHT: 60 IN | OXYGEN SATURATION: 98 % | HEART RATE: 62 BPM | SYSTOLIC BLOOD PRESSURE: 122 MMHG | RESPIRATION RATE: 16 BRPM | WEIGHT: 145 LBS | BODY MASS INDEX: 28.47 KG/M2 | DIASTOLIC BLOOD PRESSURE: 70 MMHG

## 2021-07-23 DIAGNOSIS — S16.1XXA STRAIN OF NECK MUSCLE, INITIAL ENCOUNTER: Primary | ICD-10-CM
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
## 2021-07-23 PROCEDURE — 72125 CT NECK SPINE W/O DYE: CPT

## 2021-07-23 PROCEDURE — 99283 EMERGENCY DEPT VISIT LOW MDM: CPT

## 2021-07-23 NOTE — ED PROVIDER NOTES
Subjective   History of Present Illness  89-year-old female states that she fell backwards and landed on her back and also hit the back of her head 4 days ago.  She complains of pain in the neck which is worsened every day.  She denies any numbness or tingling.  She states that it hurts to turn her head to the right.  The patient denies any other injury.  Review of Systems   Constitutional: Negative.    HENT: Negative.    Eyes: Negative.    Respiratory: Negative.    Cardiovascular: Negative.    Gastrointestinal: Negative.    Endocrine: Negative.    Genitourinary: Negative.    Musculoskeletal: Positive for back pain and neck pain.   Skin: Negative.    Allergic/Immunologic: Negative.    Neurological: Negative.    Hematological: Negative.    Psychiatric/Behavioral: Negative.        Past Medical History:   Diagnosis Date   • Allergic rhinitis    • Anemia    • Bipolar 1 disorder (CMS/HCC)    • Broken bones     cheek bone   • Bursitis of knee    • Calculus of bile duct    • Chronic kidney disease     STAGE 3   • Colon polyps    • Depression    • Dysuria    • Fundic gland polyposis of stomach 08/2014   • Gallstones 07/2014    gb sono-stone 7/14   • GERD (gastroesophageal reflux disease)    • H/O complete eye exam 2015   • H/O mammogram 2013   • History of dental examination 06/2016   • Hyperlipidemia    • Hypertension    • Infectious viral hepatitis    • Non-alcoholic fatty liver disease    • Polyp of stomach    • Spinal stenosis    • UTI (urinary tract infection)        Allergies   Allergen Reactions   • Morphine Other (See Comments)     Mood changes   • Nsaids    • Sulfa Antibiotics    • Vancomycin Rash       Past Surgical History:   Procedure Laterality Date   • BILATERAL BREAST REDUCTION     • CARDIAC CATHETERIZATION     • COLON SURGERY  08/2014    TUBULAR ADENOMA RESECTED   • COLONOSCOPY  2013   • ENDOSCOPY  08/2014    (Dr Huffman)   • ESOPHAGEAL DILATATION     • OTHER SURGICAL HISTORY  08/2014    bx stomach polyps  and colon polyps       Family History   Problem Relation Age of Onset   • Colon cancer Mother    • Cancer Mother    • Stroke Father    • Arthritis Father    • Hypertension Father    • Ovarian cancer Sister    • Heart disease Sister    • Cancer Sister    • Diabetes Brother    • Arthritis Brother    • Hypertension Brother    • Colon cancer Sister    • Cancer Sister    • Adrenal disorder Other    • Breast cancer Other    • Brain cancer Other        Social History     Socioeconomic History   • Marital status: Single     Spouse name: Not on file   • Number of children: Not on file   • Years of education: Not on file   • Highest education level: Not on file   Tobacco Use   • Smoking status: Former Smoker   • Smokeless tobacco: Never Used   Substance and Sexual Activity   • Alcohol use: No   • Drug use: No           Objective   Physical Exam  Patient is awake and alert she is afebrile vital signs are stable the HEENT exam pupils equal round reactive to light EOMs are full there is no blood or drainage from ears or from the nose there is no hematoma noted to the scalp and no tenderness is noted to the scalp she does have some tenderness in the lower cervical spine as well as to the right lateral cervical area.  She has pain when attempting to turn her head to the right.  No crepitance was noted the chest and abdomen are atraumatic the lower back was nontender there is no visible bruising the patient has arthritic changes noted in the extremities but she is able to move all extremities.  No acute focal deficit was noted  Procedures           ED Course           Results for orders placed or performed in visit on 06/26/19   Basic Metabolic Panel    Specimen: Blood   Result Value Ref Range    Glucose 109 (H) 65 - 99 mg/dL    BUN 39 (H) 8 - 20 mg/dL    Creatinine 1.50 (H) 0.40 - 1.00 mg/dL    Sodium 141 136 - 144 mmol/L    Potassium 4.9 3.6 - 5.1 mmol/L    Chloride 108 101 - 111 mmol/L    CO2 24.0 22.0 - 32.0 mmol/L    Calcium  9.4 8.9 - 10.3 mg/dL    eGFR Non African Amer 33 (L) >60 mL/min/1.73    BUN/Creatinine Ratio 26.0 5.4 - 26.2    Anion Gap 9.0 (L) 10.0 - 20.0 mmol/L   CBC Auto Differential    Specimen: Blood   Result Value Ref Range    WBC 8.10 3.40 - 10.80 10*3/mm3    RBC 3.90 3.77 - 5.28 10*6/mm3    Hemoglobin 12.2 12.0 - 15.9 g/dL    Hematocrit 37.0 34.0 - 46.6 %    MCV 94.8 79.0 - 97.0 fL    MCH 31.3 26.6 - 33.0 pg    MCHC 33.0 31.5 - 35.7 g/dL    RDW 15.7 (H) 12.3 - 15.4 %    RDW-SD 52.9 37.0 - 54.0 fl    MPV 9.5 6.0 - 12.0 fL    Platelets 281 140 - 450 10*3/mm3    Neutrophil % 58.2 42.7 - 76.0 %    Lymphocyte % 26.7 19.6 - 45.3 %    Monocyte % 12.2 (H) 5.0 - 12.0 %    Eosinophil % 2.3 0.3 - 6.2 %    Basophil % 0.6 0.0 - 1.5 %    Neutrophils, Absolute 4.70 1.70 - 7.00 10*3/mm3    Lymphocytes, Absolute 2.20 0.70 - 3.10 10*3/mm3    Monocytes, Absolute 1.00 (H) 0.10 - 0.90 10*3/mm3    Eosinophils, Absolute 0.20 0.00 - 0.40 10*3/mm3    Basophils, Absolute 0.00 0.00 - 0.20 10*3/mm3    nRBC 0.1 0.0 - 0.2 /100 WBC     Medications - No data to display  CT Cervical Spine Without Contrast    Result Date: 7/23/2021  1. No acute cervical spine findings. 2. 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 3. Degenerative changes of cervical spine as described above. Severe right neural foraminal stenosis is present at C4-5, and correlation for radiculopathy symptoms in this distribution is recommended. There is also moderate bilateral C5-6 and moderate left C4-5 neural foraminal stenosis.  Electronically Signed By-Winter Jeronimo MD On:7/23/2021 1:40 PM This report was finalized on [id number] by  Winter Jeronimo MD.                                    MDM  The patient has no acute cervical findings.  She does have degenerative changes of the cervical spine with some neuroforaminal stenosis.  Final diagnoses:   Strain of neck muscle, initial encounter   Fall, initial encounter        ED Disposition  ED Disposition     None          No follow-up provider specified.       Medication List      No changes were made to your prescriptions during this visit.          Noé Joy MD  07/23/21 7117

## 2021-07-23 NOTE — DISCHARGE INSTRUCTIONS
Continue your current medications including the hydrocodone for pain.  Heating pad to the sore area of your neck 4 times a day for 15 to 20 minutes.  Follow-up with your primary care provider if not improved in 1 week

## 2021-08-18 ENCOUNTER — APPOINTMENT (OUTPATIENT)
Dept: GENERAL RADIOLOGY | Facility: HOSPITAL | Age: 86
End: 2021-08-18

## 2021-08-18 ENCOUNTER — APPOINTMENT (OUTPATIENT)
Dept: CARDIOLOGY | Facility: HOSPITAL | Age: 86
End: 2021-08-18

## 2021-08-18 ENCOUNTER — HOSPITAL ENCOUNTER (OUTPATIENT)
Facility: HOSPITAL | Age: 86
Setting detail: OBSERVATION
LOS: 5 days | Discharge: NURSING, REHAB OR LONG-TERM CARE | End: 2021-08-24
Attending: INTERNAL MEDICINE | Admitting: HOSPITALIST

## 2021-08-18 DIAGNOSIS — N30.01 ACUTE CYSTITIS WITH HEMATURIA: Primary | ICD-10-CM

## 2021-08-18 DIAGNOSIS — E83.42 HYPOMAGNESEMIA: ICD-10-CM

## 2021-08-18 DIAGNOSIS — N28.9 ACUTE RENAL IMPAIRMENT: ICD-10-CM

## 2021-08-18 DIAGNOSIS — E87.6 HYPOKALEMIA: ICD-10-CM

## 2021-08-18 PROBLEM — N39.0 ACUTE UTI (URINARY TRACT INFECTION): Status: ACTIVE | Noted: 2021-08-18

## 2021-08-18 PROBLEM — M79.602 LEFT ARM PAIN: Status: ACTIVE | Noted: 2021-08-18

## 2021-08-18 PROBLEM — N32.81 OAB (OVERACTIVE BLADDER): Chronic | Status: ACTIVE | Noted: 2017-05-07

## 2021-08-18 PROBLEM — N32.81 OAB (OVERACTIVE BLADDER): Status: ACTIVE | Noted: 2017-05-07

## 2021-08-18 PROBLEM — G93.40 ACUTE ENCEPHALOPATHY: Status: ACTIVE | Noted: 2021-08-18

## 2021-08-18 PROBLEM — L03.113 RIGHT ARM CELLULITIS: Status: ACTIVE | Noted: 2021-08-18

## 2021-08-18 PROBLEM — M79.672 LEFT FOOT PAIN: Status: ACTIVE | Noted: 2021-08-18

## 2021-08-18 PROBLEM — L03.90 CELLULITIS: Status: ACTIVE | Noted: 2021-08-18

## 2021-08-18 LAB
ALBUMIN SERPL-MCNC: 3.5 G/DL (ref 3.5–5.2)
ALBUMIN/GLOB SERPL: 1 G/DL
ALP SERPL-CCNC: 101 U/L (ref 39–117)
ALT SERPL W P-5'-P-CCNC: 8 U/L (ref 1–33)
ANION GAP SERPL CALCULATED.3IONS-SCNC: 14 MMOL/L (ref 5–15)
AST SERPL-CCNC: 21 U/L (ref 1–32)
BACTERIA UR QL AUTO: ABNORMAL /HPF
BASOPHILS # BLD AUTO: 0.1 10*3/MM3 (ref 0–0.2)
BASOPHILS NFR BLD AUTO: 0.6 % (ref 0–1.5)
BH CV LOWER VASCULAR LEFT COMMON FEMORAL AUGMENT: NORMAL
BH CV LOWER VASCULAR LEFT COMMON FEMORAL COMPETENT: NORMAL
BH CV LOWER VASCULAR LEFT COMMON FEMORAL COMPRESS: NORMAL
BH CV LOWER VASCULAR LEFT COMMON FEMORAL PHASIC: NORMAL
BH CV LOWER VASCULAR LEFT COMMON FEMORAL SPONT: NORMAL
BH CV LOWER VASCULAR RIGHT COMMON FEMORAL AUGMENT: NORMAL
BH CV LOWER VASCULAR RIGHT COMMON FEMORAL COMPETENT: NORMAL
BH CV LOWER VASCULAR RIGHT COMMON FEMORAL COMPRESS: NORMAL
BH CV LOWER VASCULAR RIGHT COMMON FEMORAL PHASIC: NORMAL
BH CV LOWER VASCULAR RIGHT COMMON FEMORAL SPONT: NORMAL
BH CV LOWER VASCULAR RIGHT DISTAL FEMORAL COMPRESS: NORMAL
BH CV LOWER VASCULAR RIGHT GASTRONEMIUS COMPRESS: NORMAL
BH CV LOWER VASCULAR RIGHT GREATER SAPH AK COMPRESS: NORMAL
BH CV LOWER VASCULAR RIGHT GREATER SAPH BK COMPRESS: NORMAL
BH CV LOWER VASCULAR RIGHT LESSER SAPH COMPRESS: NORMAL
BH CV LOWER VASCULAR RIGHT MID FEMORAL AUGMENT: NORMAL
BH CV LOWER VASCULAR RIGHT MID FEMORAL COMPETENT: NORMAL
BH CV LOWER VASCULAR RIGHT MID FEMORAL COMPRESS: NORMAL
BH CV LOWER VASCULAR RIGHT MID FEMORAL PHASIC: NORMAL
BH CV LOWER VASCULAR RIGHT MID FEMORAL SPONT: NORMAL
BH CV LOWER VASCULAR RIGHT PERONEAL COMPRESS: NORMAL
BH CV LOWER VASCULAR RIGHT POPLITEAL AUGMENT: NORMAL
BH CV LOWER VASCULAR RIGHT POPLITEAL COMPETENT: NORMAL
BH CV LOWER VASCULAR RIGHT POPLITEAL COMPRESS: NORMAL
BH CV LOWER VASCULAR RIGHT POPLITEAL PHASIC: NORMAL
BH CV LOWER VASCULAR RIGHT POPLITEAL SPONT: NORMAL
BH CV LOWER VASCULAR RIGHT POSTERIOR TIBIAL COMPRESS: NORMAL
BH CV LOWER VASCULAR RIGHT PROXIMAL FEMORAL COMPRESS: NORMAL
BH CV LOWER VASCULAR RIGHT SAPHENOFEMORAL JUNCTION COMPRESS: NORMAL
BH CV UPPER VENOUS LEFT AXILLARY AUGMENT: NORMAL
BH CV UPPER VENOUS LEFT AXILLARY COMPETENT: NORMAL
BH CV UPPER VENOUS LEFT AXILLARY COMPRESS: NORMAL
BH CV UPPER VENOUS LEFT AXILLARY PHASIC: NORMAL
BH CV UPPER VENOUS LEFT AXILLARY SPONT: NORMAL
BH CV UPPER VENOUS LEFT BASILIC FOREARM COMPRESS: NORMAL
BH CV UPPER VENOUS LEFT BASILIC UPPER COMPRESS: NORMAL
BH CV UPPER VENOUS LEFT BRACHIAL COMPRESS: NORMAL
BH CV UPPER VENOUS LEFT CEPHALIC FOREARM COMPRESS: NORMAL
BH CV UPPER VENOUS LEFT CEPHALIC UPPER COMPRESS: NORMAL
BH CV UPPER VENOUS LEFT INTERNAL JUGULAR AUGMENT: NORMAL
BH CV UPPER VENOUS LEFT INTERNAL JUGULAR COMPETENT: NORMAL
BH CV UPPER VENOUS LEFT INTERNAL JUGULAR COMPETENT: NORMAL
BH CV UPPER VENOUS LEFT INTERNAL JUGULAR COMPRESS: NORMAL
BH CV UPPER VENOUS LEFT INTERNAL JUGULAR COMPRESS: NORMAL
BH CV UPPER VENOUS LEFT INTERNAL JUGULAR PHASIC: NORMAL
BH CV UPPER VENOUS LEFT INTERNAL JUGULAR PHASIC: NORMAL
BH CV UPPER VENOUS LEFT INTERNAL JUGULAR SPONT: NORMAL
BH CV UPPER VENOUS LEFT INTERNAL JUGULAR SPONT: NORMAL
BH CV UPPER VENOUS LEFT RADIAL COMPRESS: NORMAL
BH CV UPPER VENOUS LEFT SUBCLAVIAN AUGMENT: NORMAL
BH CV UPPER VENOUS LEFT SUBCLAVIAN AUGMENT: NORMAL
BH CV UPPER VENOUS LEFT SUBCLAVIAN COMPETENT: NORMAL
BH CV UPPER VENOUS LEFT SUBCLAVIAN COMPETENT: NORMAL
BH CV UPPER VENOUS LEFT SUBCLAVIAN COMPRESS: NORMAL
BH CV UPPER VENOUS LEFT SUBCLAVIAN COMPRESS: NORMAL
BH CV UPPER VENOUS LEFT SUBCLAVIAN PHASIC: NORMAL
BH CV UPPER VENOUS LEFT SUBCLAVIAN PHASIC: NORMAL
BH CV UPPER VENOUS LEFT SUBCLAVIAN SPONT: NORMAL
BH CV UPPER VENOUS LEFT SUBCLAVIAN SPONT: NORMAL
BH CV UPPER VENOUS LEFT ULNAR COMPRESS: NORMAL
BH CV UPPER VENOUS RIGHT AXILLARY AUGMENT: NORMAL
BH CV UPPER VENOUS RIGHT AXILLARY COMPETENT: NORMAL
BH CV UPPER VENOUS RIGHT AXILLARY COMPRESS: NORMAL
BH CV UPPER VENOUS RIGHT AXILLARY PHASIC: NORMAL
BH CV UPPER VENOUS RIGHT AXILLARY SPONT: NORMAL
BH CV UPPER VENOUS RIGHT BASILIC FOREARM COMPRESS: NORMAL
BH CV UPPER VENOUS RIGHT BASILIC UPPER COMPRESS: NORMAL
BH CV UPPER VENOUS RIGHT BRACHIAL COMPRESS: NORMAL
BH CV UPPER VENOUS RIGHT CEPHALIC FOREARM COMPRESS: NORMAL
BH CV UPPER VENOUS RIGHT CEPHALIC UPPER COMPRESS: NORMAL
BH CV UPPER VENOUS RIGHT INTERNAL JUGULAR AUGMENT: NORMAL
BH CV UPPER VENOUS RIGHT INTERNAL JUGULAR COMPETENT: NORMAL
BH CV UPPER VENOUS RIGHT INTERNAL JUGULAR COMPETENT: NORMAL
BH CV UPPER VENOUS RIGHT INTERNAL JUGULAR COMPRESS: NORMAL
BH CV UPPER VENOUS RIGHT INTERNAL JUGULAR COMPRESS: NORMAL
BH CV UPPER VENOUS RIGHT INTERNAL JUGULAR PHASIC: NORMAL
BH CV UPPER VENOUS RIGHT INTERNAL JUGULAR PHASIC: NORMAL
BH CV UPPER VENOUS RIGHT INTERNAL JUGULAR SPONT: NORMAL
BH CV UPPER VENOUS RIGHT INTERNAL JUGULAR SPONT: NORMAL
BH CV UPPER VENOUS RIGHT RADIAL COMPRESS: NORMAL
BH CV UPPER VENOUS RIGHT SUBCLAVIAN AUGMENT: NORMAL
BH CV UPPER VENOUS RIGHT SUBCLAVIAN AUGMENT: NORMAL
BH CV UPPER VENOUS RIGHT SUBCLAVIAN COMPETENT: NORMAL
BH CV UPPER VENOUS RIGHT SUBCLAVIAN COMPETENT: NORMAL
BH CV UPPER VENOUS RIGHT SUBCLAVIAN COMPRESS: NORMAL
BH CV UPPER VENOUS RIGHT SUBCLAVIAN COMPRESS: NORMAL
BH CV UPPER VENOUS RIGHT SUBCLAVIAN PHASIC: NORMAL
BH CV UPPER VENOUS RIGHT SUBCLAVIAN PHASIC: NORMAL
BH CV UPPER VENOUS RIGHT SUBCLAVIAN SPONT: NORMAL
BH CV UPPER VENOUS RIGHT SUBCLAVIAN SPONT: NORMAL
BH CV UPPER VENOUS RIGHT ULNAR COMPRESS: NORMAL
BH CV VAS POP FLUID COLLECTED: 1
BILIRUB SERPL-MCNC: 0.6 MG/DL (ref 0–1.2)
BILIRUB UR QL STRIP: NEGATIVE
BUN SERPL-MCNC: 27 MG/DL (ref 8–23)
BUN/CREAT SERPL: 26.2 (ref 7–25)
CALCIUM SPEC-SCNC: 8.8 MG/DL (ref 8.6–10.5)
CHLORIDE SERPL-SCNC: 95 MMOL/L (ref 98–107)
CLARITY UR: ABNORMAL
CO2 SERPL-SCNC: 28 MMOL/L (ref 22–29)
COLOR UR: YELLOW
CREAT SERPL-MCNC: 1.03 MG/DL (ref 0.57–1)
CRP SERPL-MCNC: 6.18 MG/DL (ref 0–0.5)
D-LACTATE SERPL-SCNC: 0.7 MMOL/L (ref 0.5–2)
D-LACTATE SERPL-SCNC: 0.8 MMOL/L (ref 0.5–2)
DEPRECATED RDW RBC AUTO: 50.8 FL (ref 37–54)
EOSINOPHIL # BLD AUTO: 0 10*3/MM3 (ref 0–0.4)
EOSINOPHIL NFR BLD AUTO: 0.1 % (ref 0.3–6.2)
ERYTHROCYTE [DISTWIDTH] IN BLOOD BY AUTOMATED COUNT: 15.6 % (ref 12.3–15.4)
ERYTHROCYTE [SEDIMENTATION RATE] IN BLOOD: 48 MM/HR (ref 0–30)
GFR SERPL CREATININE-BSD FRML MDRD: 50 ML/MIN/1.73
GLOBULIN UR ELPH-MCNC: 3.5 GM/DL
GLUCOSE SERPL-MCNC: 99 MG/DL (ref 65–99)
GLUCOSE UR STRIP-MCNC: NEGATIVE MG/DL
HBA1C MFR BLD: 5.8 % (ref 3.5–5.6)
HCT VFR BLD AUTO: 36.6 % (ref 34–46.6)
HGB BLD-MCNC: 12.4 G/DL (ref 12–15.9)
HGB UR QL STRIP.AUTO: ABNORMAL
HYALINE CASTS UR QL AUTO: ABNORMAL /LPF
KETONES UR QL STRIP: ABNORMAL
LEUKOCYTE ESTERASE UR QL STRIP.AUTO: ABNORMAL
LYMPHOCYTES # BLD AUTO: 0.8 10*3/MM3 (ref 0.7–3.1)
LYMPHOCYTES NFR BLD AUTO: 7.9 % (ref 19.6–45.3)
MAGNESIUM SERPL-MCNC: 1.5 MG/DL (ref 1.6–2.4)
MAXIMAL PREDICTED HEART RATE: 131 BPM
MCH RBC QN AUTO: 31.6 PG (ref 26.6–33)
MCHC RBC AUTO-ENTMCNC: 33.8 G/DL (ref 31.5–35.7)
MCV RBC AUTO: 93.6 FL (ref 79–97)
MONOCYTES # BLD AUTO: 1.4 10*3/MM3 (ref 0.1–0.9)
MONOCYTES NFR BLD AUTO: 13.2 % (ref 5–12)
NEUTROPHILS NFR BLD AUTO: 78.2 % (ref 42.7–76)
NEUTROPHILS NFR BLD AUTO: 8.2 10*3/MM3 (ref 1.7–7)
NITRITE UR QL STRIP: POSITIVE
NRBC BLD AUTO-RTO: 0 /100 WBC (ref 0–0.2)
PH UR STRIP.AUTO: 7.5 [PH] (ref 5–8)
PLATELET # BLD AUTO: 248 10*3/MM3 (ref 140–450)
PMV BLD AUTO: 9.1 FL (ref 6–12)
POTASSIUM SERPL-SCNC: 2.4 MMOL/L (ref 3.5–5.2)
PROT SERPL-MCNC: 7 G/DL (ref 6–8.5)
PROT UR QL STRIP: NEGATIVE
RBC # BLD AUTO: 3.91 10*6/MM3 (ref 3.77–5.28)
RBC # UR: ABNORMAL /HPF
REF LAB TEST METHOD: ABNORMAL
SARS-COV-2 RNA PNL SPEC NAA+PROBE: NOT DETECTED
SODIUM SERPL-SCNC: 137 MMOL/L (ref 136–145)
SP GR UR STRIP: 1.01 (ref 1–1.03)
SQUAMOUS #/AREA URNS HPF: ABNORMAL /HPF
STRESS TARGET HR: 111 BPM
UROBILINOGEN UR QL STRIP: ABNORMAL
WBC # BLD AUTO: 10.4 10*3/MM3 (ref 3.4–10.8)
WBC UR QL AUTO: ABNORMAL /HPF

## 2021-08-18 PROCEDURE — 83036 HEMOGLOBIN GLYCOSYLATED A1C: CPT | Performed by: NURSE PRACTITIONER

## 2021-08-18 PROCEDURE — 93971 EXTREMITY STUDY: CPT

## 2021-08-18 PROCEDURE — 25010000002 MAGNESIUM SULFATE 2 GM/50ML SOLUTION: Performed by: NURSE PRACTITIONER

## 2021-08-18 PROCEDURE — 80053 COMPREHEN METABOLIC PANEL: CPT | Performed by: NURSE PRACTITIONER

## 2021-08-18 PROCEDURE — 96372 THER/PROPH/DIAG INJ SC/IM: CPT

## 2021-08-18 PROCEDURE — 87040 BLOOD CULTURE FOR BACTERIA: CPT | Performed by: NURSE PRACTITIONER

## 2021-08-18 PROCEDURE — 83735 ASSAY OF MAGNESIUM: CPT | Performed by: NURSE PRACTITIONER

## 2021-08-18 PROCEDURE — 36415 COLL VENOUS BLD VENIPUNCTURE: CPT | Performed by: NURSE PRACTITIONER

## 2021-08-18 PROCEDURE — 86140 C-REACTIVE PROTEIN: CPT | Performed by: NURSE PRACTITIONER

## 2021-08-18 PROCEDURE — P9612 CATHETERIZE FOR URINE SPEC: HCPCS

## 2021-08-18 PROCEDURE — 93005 ELECTROCARDIOGRAM TRACING: CPT | Performed by: NURSE PRACTITIONER

## 2021-08-18 PROCEDURE — 99220 PR INITIAL OBSERVATION CARE/DAY 70 MINUTES: CPT | Performed by: INTERNAL MEDICINE

## 2021-08-18 PROCEDURE — 87635 SARS-COV-2 COVID-19 AMP PRB: CPT | Performed by: INTERNAL MEDICINE

## 2021-08-18 PROCEDURE — 87086 URINE CULTURE/COLONY COUNT: CPT | Performed by: NURSE PRACTITIONER

## 2021-08-18 PROCEDURE — 25010000002 CEFTRIAXONE PER 250 MG: Performed by: NURSE PRACTITIONER

## 2021-08-18 PROCEDURE — 81001 URINALYSIS AUTO W/SCOPE: CPT | Performed by: NURSE PRACTITIONER

## 2021-08-18 PROCEDURE — C9803 HOPD COVID-19 SPEC COLLECT: HCPCS

## 2021-08-18 PROCEDURE — 73060 X-RAY EXAM OF HUMERUS: CPT

## 2021-08-18 PROCEDURE — 85652 RBC SED RATE AUTOMATED: CPT | Performed by: NURSE PRACTITIONER

## 2021-08-18 PROCEDURE — 87186 SC STD MICRODIL/AGAR DIL: CPT | Performed by: NURSE PRACTITIONER

## 2021-08-18 PROCEDURE — 83605 ASSAY OF LACTIC ACID: CPT | Performed by: NURSE PRACTITIONER

## 2021-08-18 PROCEDURE — 85025 COMPLETE CBC W/AUTO DIFF WBC: CPT | Performed by: NURSE PRACTITIONER

## 2021-08-18 PROCEDURE — 73630 X-RAY EXAM OF FOOT: CPT

## 2021-08-18 PROCEDURE — 96376 TX/PRO/DX INJ SAME DRUG ADON: CPT

## 2021-08-18 PROCEDURE — 83605 ASSAY OF LACTIC ACID: CPT

## 2021-08-18 PROCEDURE — 99285 EMERGENCY DEPT VISIT HI MDM: CPT

## 2021-08-18 PROCEDURE — 25010000002 HEPARIN (PORCINE) PER 1000 UNITS: Performed by: NURSE PRACTITIONER

## 2021-08-18 RX ORDER — ARIPIPRAZOLE 2 MG/1
2 TABLET ORAL NIGHTLY
Status: DISCONTINUED | OUTPATIENT
Start: 2021-08-18 | End: 2021-08-24 | Stop reason: HOSPADM

## 2021-08-18 RX ORDER — POLYETHYLENE GLYCOL 3350 17 G/17G
15 POWDER, FOR SOLUTION ORAL DAILY PRN
COMMUNITY

## 2021-08-18 RX ORDER — ACETAMINOPHEN 325 MG/1
650 TABLET ORAL 2 TIMES DAILY PRN
Status: DISCONTINUED | OUTPATIENT
Start: 2021-08-18 | End: 2021-08-18

## 2021-08-18 RX ORDER — BENZONATATE 100 MG/1
100 CAPSULE ORAL EVERY 8 HOURS PRN
COMMUNITY

## 2021-08-18 RX ORDER — ACETAMINOPHEN 325 MG/1
650 TABLET ORAL EVERY 4 HOURS PRN
Status: DISCONTINUED | OUTPATIENT
Start: 2021-08-18 | End: 2021-08-24 | Stop reason: HOSPADM

## 2021-08-18 RX ORDER — OXYBUTYNIN CHLORIDE 10 MG/1
10 TABLET, EXTENDED RELEASE ORAL DAILY
Status: DISCONTINUED | OUTPATIENT
Start: 2021-08-18 | End: 2021-08-24 | Stop reason: HOSPADM

## 2021-08-18 RX ORDER — TRAZODONE HYDROCHLORIDE 50 MG/1
25-50 TABLET ORAL NIGHTLY
COMMUNITY

## 2021-08-18 RX ORDER — ACETAMINOPHEN 325 MG/1
650 TABLET ORAL 2 TIMES DAILY PRN
COMMUNITY

## 2021-08-18 RX ORDER — ARIPIPRAZOLE 2 MG/1
2 TABLET ORAL NIGHTLY
COMMUNITY

## 2021-08-18 RX ORDER — ONDANSETRON 4 MG/1
4 TABLET, FILM COATED ORAL EVERY 6 HOURS PRN
Status: DISCONTINUED | OUTPATIENT
Start: 2021-08-18 | End: 2021-08-24 | Stop reason: HOSPADM

## 2021-08-18 RX ORDER — MAGNESIUM SULFATE HEPTAHYDRATE 40 MG/ML
4 INJECTION, SOLUTION INTRAVENOUS AS NEEDED
Status: DISCONTINUED | OUTPATIENT
Start: 2021-08-18 | End: 2021-08-24 | Stop reason: HOSPADM

## 2021-08-18 RX ORDER — EZETIMIBE 10 MG/1
10 TABLET ORAL DAILY
Status: DISCONTINUED | OUTPATIENT
Start: 2021-08-18 | End: 2021-08-24 | Stop reason: HOSPADM

## 2021-08-18 RX ORDER — DIVALPROEX SODIUM 125 MG/1
125 TABLET, DELAYED RELEASE ORAL 2 TIMES DAILY
COMMUNITY

## 2021-08-18 RX ORDER — ACETAMINOPHEN 650 MG/1
650 SUPPOSITORY RECTAL EVERY 4 HOURS PRN
Status: DISCONTINUED | OUTPATIENT
Start: 2021-08-18 | End: 2021-08-24 | Stop reason: HOSPADM

## 2021-08-18 RX ORDER — LOPERAMIDE HYDROCHLORIDE 2 MG/1
2 TABLET ORAL EVERY 6 HOURS PRN
COMMUNITY

## 2021-08-18 RX ORDER — PANTOPRAZOLE SODIUM 40 MG/1
40 TABLET, DELAYED RELEASE ORAL EVERY MORNING
Refills: 0 | Status: DISCONTINUED | OUTPATIENT
Start: 2021-08-19 | End: 2021-08-24 | Stop reason: HOSPADM

## 2021-08-18 RX ORDER — SODIUM CHLORIDE 0.9 % (FLUSH) 0.9 %
10 SYRINGE (ML) INJECTION EVERY 12 HOURS SCHEDULED
Status: DISCONTINUED | OUTPATIENT
Start: 2021-08-18 | End: 2021-08-24 | Stop reason: HOSPADM

## 2021-08-18 RX ORDER — FUROSEMIDE 40 MG/1
40 TABLET ORAL DAILY
COMMUNITY

## 2021-08-18 RX ORDER — FLUOXETINE HYDROCHLORIDE 20 MG/1
40 CAPSULE ORAL NIGHTLY
Status: DISCONTINUED | OUTPATIENT
Start: 2021-08-18 | End: 2021-08-24 | Stop reason: HOSPADM

## 2021-08-18 RX ORDER — ACETAMINOPHEN 160 MG/5ML
650 SOLUTION ORAL EVERY 4 HOURS PRN
Status: DISCONTINUED | OUTPATIENT
Start: 2021-08-18 | End: 2021-08-24 | Stop reason: HOSPADM

## 2021-08-18 RX ORDER — METOLAZONE 2.5 MG/1
2.5 TABLET ORAL DAILY
COMMUNITY

## 2021-08-18 RX ORDER — TRAZODONE HYDROCHLORIDE 50 MG/1
25 TABLET ORAL NIGHTLY PRN
Status: DISCONTINUED | OUTPATIENT
Start: 2021-08-18 | End: 2021-08-24 | Stop reason: HOSPADM

## 2021-08-18 RX ORDER — ACETAMINOPHEN 500 MG
1000 TABLET ORAL ONCE
Status: COMPLETED | OUTPATIENT
Start: 2021-08-18 | End: 2021-08-18

## 2021-08-18 RX ORDER — POTASSIUM CHLORIDE 7.45 MG/ML
10 INJECTION INTRAVENOUS
Status: DISCONTINUED | OUTPATIENT
Start: 2021-08-18 | End: 2021-08-24 | Stop reason: HOSPADM

## 2021-08-18 RX ORDER — SODIUM CHLORIDE 0.9 % (FLUSH) 0.9 %
10 SYRINGE (ML) INJECTION AS NEEDED
Status: DISCONTINUED | OUTPATIENT
Start: 2021-08-18 | End: 2021-08-24 | Stop reason: HOSPADM

## 2021-08-18 RX ORDER — ATORVASTATIN CALCIUM 40 MG/1
80 TABLET, FILM COATED ORAL NIGHTLY
Status: DISCONTINUED | OUTPATIENT
Start: 2021-08-18 | End: 2021-08-24 | Stop reason: HOSPADM

## 2021-08-18 RX ORDER — INSULIN LISPRO 100 [IU]/ML
0-9 INJECTION, SOLUTION INTRAVENOUS; SUBCUTANEOUS AS NEEDED
Status: DISCONTINUED | OUTPATIENT
Start: 2021-08-18 | End: 2021-08-18

## 2021-08-18 RX ORDER — INSULIN LISPRO 100 [IU]/ML
0-9 INJECTION, SOLUTION INTRAVENOUS; SUBCUTANEOUS
Status: DISCONTINUED | OUTPATIENT
Start: 2021-08-18 | End: 2021-08-18

## 2021-08-18 RX ORDER — HEPARIN SODIUM 5000 [USP'U]/ML
5000 INJECTION, SOLUTION INTRAVENOUS; SUBCUTANEOUS EVERY 8 HOURS SCHEDULED
Status: DISCONTINUED | OUTPATIENT
Start: 2021-08-18 | End: 2021-08-24 | Stop reason: HOSPADM

## 2021-08-18 RX ORDER — MENTHOL 40 MG/ML
1 GEL TOPICAL 3 TIMES DAILY PRN
COMMUNITY

## 2021-08-18 RX ORDER — NICOTINE POLACRILEX 4 MG
15 LOZENGE BUCCAL
Status: DISCONTINUED | OUTPATIENT
Start: 2021-08-18 | End: 2021-08-18

## 2021-08-18 RX ORDER — POLYVINYL ALCOHOL 14 MG/ML
1 SOLUTION/ DROPS OPHTHALMIC EVERY 4 HOURS PRN
COMMUNITY

## 2021-08-18 RX ORDER — HYDRALAZINE HYDROCHLORIDE 20 MG/ML
10 INJECTION INTRAMUSCULAR; INTRAVENOUS EVERY 6 HOURS PRN
Status: DISCONTINUED | OUTPATIENT
Start: 2021-08-18 | End: 2021-08-24 | Stop reason: HOSPADM

## 2021-08-18 RX ORDER — EZETIMIBE 10 MG/1
10 TABLET ORAL DAILY
COMMUNITY

## 2021-08-18 RX ORDER — POTASSIUM CHLORIDE 20 MEQ/1
40 TABLET, EXTENDED RELEASE ORAL AS NEEDED
Status: DISCONTINUED | OUTPATIENT
Start: 2021-08-18 | End: 2021-08-24 | Stop reason: HOSPADM

## 2021-08-18 RX ORDER — DEXTROSE MONOHYDRATE 25 G/50ML
25 INJECTION, SOLUTION INTRAVENOUS
Status: DISCONTINUED | OUTPATIENT
Start: 2021-08-18 | End: 2021-08-18

## 2021-08-18 RX ORDER — MAGNESIUM SULFATE HEPTAHYDRATE 40 MG/ML
2 INJECTION, SOLUTION INTRAVENOUS AS NEEDED
Status: DISCONTINUED | OUTPATIENT
Start: 2021-08-18 | End: 2021-08-24 | Stop reason: HOSPADM

## 2021-08-18 RX ORDER — BENZONATATE 100 MG/1
100 CAPSULE ORAL EVERY 8 HOURS PRN
Status: DISCONTINUED | OUTPATIENT
Start: 2021-08-18 | End: 2021-08-24 | Stop reason: HOSPADM

## 2021-08-18 RX ORDER — ONDANSETRON 4 MG/1
4 TABLET, FILM COATED ORAL EVERY 6 HOURS PRN
COMMUNITY

## 2021-08-18 RX ORDER — METOLAZONE 5 MG/1
5 TABLET ORAL DAILY
COMMUNITY

## 2021-08-18 RX ORDER — HYDROCODONE BITARTRATE AND ACETAMINOPHEN 5; 325 MG/1; MG/1
1 TABLET ORAL 3 TIMES DAILY
Status: DISCONTINUED | OUTPATIENT
Start: 2021-08-18 | End: 2021-08-24 | Stop reason: HOSPADM

## 2021-08-18 RX ORDER — ONDANSETRON 2 MG/ML
4 INJECTION INTRAMUSCULAR; INTRAVENOUS EVERY 6 HOURS PRN
Status: DISCONTINUED | OUTPATIENT
Start: 2021-08-18 | End: 2021-08-24 | Stop reason: HOSPADM

## 2021-08-18 RX ORDER — DIVALPROEX SODIUM 125 MG/1
125 TABLET, DELAYED RELEASE ORAL 2 TIMES DAILY
Status: DISCONTINUED | OUTPATIENT
Start: 2021-08-18 | End: 2021-08-24 | Stop reason: HOSPADM

## 2021-08-18 RX ORDER — HYDROCODONE BITARTRATE AND ACETAMINOPHEN 5; 325 MG/1; MG/1
1 TABLET ORAL 3 TIMES DAILY
COMMUNITY

## 2021-08-18 RX ORDER — POTASSIUM CHLORIDE 750 MG/1
10 TABLET, EXTENDED RELEASE ORAL 2 TIMES DAILY
COMMUNITY

## 2021-08-18 RX ORDER — POTASSIUM CHLORIDE 1.5 G/1.77G
40 POWDER, FOR SOLUTION ORAL AS NEEDED
Status: DISCONTINUED | OUTPATIENT
Start: 2021-08-18 | End: 2021-08-24 | Stop reason: HOSPADM

## 2021-08-18 RX ORDER — POTASSIUM CHLORIDE 20 MEQ/1
40 TABLET, EXTENDED RELEASE ORAL AS NEEDED
Status: DISCONTINUED | OUTPATIENT
Start: 2021-08-18 | End: 2021-08-18 | Stop reason: SDUPTHER

## 2021-08-18 RX ORDER — METOLAZONE 5 MG/1
5 TABLET ORAL DAILY
Status: DISCONTINUED | OUTPATIENT
Start: 2021-08-18 | End: 2021-08-24 | Stop reason: HOSPADM

## 2021-08-18 RX ADMIN — Medication 10 ML: at 21:41

## 2021-08-18 RX ADMIN — HEPARIN SODIUM 5000 UNITS: 5000 INJECTION INTRAVENOUS; SUBCUTANEOUS at 16:02

## 2021-08-18 RX ADMIN — DIVALPROEX SODIUM 125 MG: 125 TABLET, DELAYED RELEASE ORAL at 21:40

## 2021-08-18 RX ADMIN — POTASSIUM CHLORIDE 40 MEQ: 1500 TABLET, EXTENDED RELEASE ORAL at 17:47

## 2021-08-18 RX ADMIN — ATORVASTATIN CALCIUM 80 MG: 40 TABLET, FILM COATED ORAL at 21:40

## 2021-08-18 RX ADMIN — OXYBUTYNIN CHLORIDE 10 MG: 10 TABLET, EXTENDED RELEASE ORAL at 16:02

## 2021-08-18 RX ADMIN — WATER 1 G: 100 INJECTION, SOLUTION INTRAVENOUS at 11:26

## 2021-08-18 RX ADMIN — FLUOXETINE 40 MG: 20 CAPSULE ORAL at 21:40

## 2021-08-18 RX ADMIN — HEPARIN SODIUM 5000 UNITS: 5000 INJECTION INTRAVENOUS; SUBCUTANEOUS at 21:40

## 2021-08-18 RX ADMIN — HYDROCODONE BITARTRATE AND ACETAMINOPHEN 1 TABLET: 5; 325 TABLET ORAL at 16:03

## 2021-08-18 RX ADMIN — MAGNESIUM SULFATE HEPTAHYDRATE 2 G: 2 INJECTION, SOLUTION INTRAVENOUS at 11:43

## 2021-08-18 RX ADMIN — POTASSIUM CHLORIDE 40 MEQ: 1.5 POWDER, FOR SOLUTION ORAL at 11:26

## 2021-08-18 RX ADMIN — ACETAMINOPHEN 1000 MG: 500 TABLET, FILM COATED ORAL at 11:26

## 2021-08-18 RX ADMIN — TOPIRAMATE 150 MG: 100 TABLET, FILM COATED ORAL at 21:40

## 2021-08-18 RX ADMIN — METOLAZONE 5 MG: 5 TABLET ORAL at 16:02

## 2021-08-18 RX ADMIN — HYDROCODONE BITARTRATE AND ACETAMINOPHEN 1 TABLET: 5; 325 TABLET ORAL at 21:40

## 2021-08-18 RX ADMIN — ARIPIPRAZOLE 2 MG: 2 TABLET ORAL at 21:40

## 2021-08-18 RX ADMIN — SODIUM CHLORIDE 1000 ML: 9 INJECTION, SOLUTION INTRAVENOUS at 12:04

## 2021-08-18 NOTE — H&P
[facility]      Patient Name: Sarah Zendejas  : 1931  MRN: [id number]  Primary Care Physician:  Provider, No Known  Date of admission: 2021      Subjective      Chief Complaint: Left leg/arm pain     Information obtained from ED provider as patient is confused.    History of Present Illness: Sarah Zendejas is a 89 y.o. female who presented to [facility] on 2021 complaining of right arm and leg pain.  Per ED provider ECF sent patient in due to patient having complaints of left arm and left foot pain.  ECF also noted patient has had increased confusion.  Upon assessment patient is alert and oriented x3 with confusion noted.  Patient is unsure of why she was brought to the hospital.  She did report she has had generalized weakness lately.      In the ED, X ray left humerus showed Negative for fracture or dislocation. Advanced glenohumeral osteoarthritis.X ray left foot showed Left foot degenerative changes with osteopenia. No acute left foot findings. EKG showed Sinus rhythm, Prolonged QT interval. LUE venous duplex negative.  All labs unremarkable upon admission except potassium 2.4, BUN 27, mag 1.5, urinalysis showed 15 ketones, trace blood, positive nitrite, large leukocytes, 3-5 red blood cells, too numerous to count white blood cells, 3+ bacteria.  Urine culture pending.  All vital signs stable upon admission except blood pressure 169/71.  Patient received Tylenol and Rocephin in the ED.    Review of Systems   Unable to perform ROS: mental status change        Personal History     Past Medical History:   Diagnosis Date    Allergic rhinitis     Anemia     Bipolar 1 disorder (CMS/HCC)     Broken bones     cheek bone    Bursitis of knee     Calculus of bile duct     Chronic kidney disease     STAGE 3    Colon polyps     Depression     Dysuria     Fundic gland polyposis of stomach 2014    Gallstones 2014    gb sono-stone     GERD  (gastroesophageal reflux disease)     H/O complete eye exam 2015    H/O mammogram 2013    History of dental examination 06/2016    Hyperlipidemia     Hypertension     Infectious viral hepatitis     Non-alcoholic fatty liver disease     Polyp of stomach     Spinal stenosis     UTI (urinary tract infection)        Past Surgical History:   Procedure Laterality Date    BILATERAL BREAST REDUCTION      CARDIAC CATHETERIZATION      COLON SURGERY  08/2014    TUBULAR ADENOMA RESECTED    COLONOSCOPY  2013    ENDOSCOPY  08/2014    (Dr Huffman)    ESOPHAGEAL DILATATION      OTHER SURGICAL HISTORY  08/2014    bx stomach polyps and colon polyps       Family History: family history includes Adrenal disorder in an other family member; Arthritis in her brother and father; Brain cancer in an other family member; Breast cancer in an other family member; Cancer in her mother, sister, and sister; Colon cancer in her mother and sister; Diabetes in her brother; Heart disease in her sister; Hypertension in her brother and father; Ovarian cancer in her sister; Stroke in her father. Otherwise pertinent FHx was reviewed and not pertinent to current issue.    Social History:  reports that she has quit smoking. She has never used smokeless tobacco. She reports that she does not drink alcohol and does not use drugs.    Home Medications:  Prior to Admission Medications       Prescriptions Last Dose Informant Patient Reported? Taking?    ALPRAZolam (XANAX) 1 MG tablet   Yes No    Take 1 mg by mouth 2 (two) times a day as needed for anxiety.    amLODIPine (NORVASC) 2.5 MG tablet   No No    Take 1 tablet by mouth Daily.    ARIPiprazole (ABILIFY) 2 MG tablet   No No    Take 0.5 tablets by mouth Daily.    atorvastatin (LIPITOR) 80 MG tablet   No No    Take 1 tablet by mouth Every Night.    calcitriol (ROCALTROL) 0.25 MCG capsule   Yes No    TK 1 C PO D    conjugated estrogens (PREMARIN) 0.625 MG/GM vaginal cream   Yes No    Insert  into the vagina  Daily.    CRANBERRY PO   Yes No    Take  by mouth.    FLUoxetine (PROzac) 40 MG capsule   Yes No    Take 40 mg by mouth daily.    fluticasone (FLONASE) 50 MCG/ACT nasal spray   Yes No    2 sprays into each nostril daily. Administer 2 sprays in each nostril for each dose.    HYDROcodone-acetaminophen (NORCO)  MG per tablet   Yes No    TK 1/2 TO 1 T PO BID PRN P    lisinopril (PRINIVIL,ZESTRIL) 40 MG tablet   No No    Take 1 tablet by mouth Daily.    Nitrofurantoin Monohyd Macro (MACROBID PO)   Yes No    Take 50 mg by mouth Daily.    omeprazole (priLOSEC) 20 MG capsule   No No    TAKE ONE CAPSULE BY MOUTH EVERY DAY    tolterodine (DETROL) 1 MG tablet   No No    TAKE 1 TABLET BY MOUTH TWICE DAILY    topiramate (TOPAMAX) 50 MG tablet   Yes No    Take 50 mg by mouth Daily. 3 tablets at night times              Allergies:  Allergies   Allergen Reactions    Morphine Other (See Comments)     Mood changes    Nsaids     Sulfa Antibiotics     Vancomycin Rash       Objective      Vitals:   Temp:  [98.3 °F (36.8 °C)] 98.3 °F (36.8 °C)  Heart Rate:  [70-92] 90  Resp:  [16] 16  BP: (153-182)/(66-96) 153/75    Physical Exam  Vitals reviewed.   Constitutional:       Appearance: Normal appearance. She is normal weight.   HENT:      Head: Normocephalic and atraumatic.      Nose: Nose normal.      Mouth/Throat:      Mouth: Mucous membranes are moist.      Pharynx: Oropharynx is clear.   Eyes:      Extraocular Movements: Extraocular movements intact.      Conjunctiva/sclera: Conjunctivae normal.      Pupils: Pupils are equal, round, and reactive to light.   Cardiovascular:      Rate and Rhythm: Normal rate and regular rhythm.      Pulses: Normal pulses.      Heart sounds: Normal heart sounds.      Comments: S1, S2 audible  Pulmonary:      Effort: Pulmonary effort is normal.      Breath sounds: Normal breath sounds.      Comments: On room air   Abdominal:      General: Abdomen is flat. Bowel sounds are normal.      Palpations:  Abdomen is soft.   Musculoskeletal:      Cervical back: Normal range of motion.      Comments: Generalized weakness, swelling RLE    Skin:     General: Skin is warm and dry.      Findings: Erythema present.      Comments: RLE hot to touch, erythema noted RUE and RLE    Neurological:      General: No focal deficit present.      Mental Status: She is alert and oriented to person, place, and time.      Comments: Slight confusion noted   Psychiatric:         Mood and Affect: Mood normal.         Behavior: Behavior normal.         Result Review    Result Review:  I have personally reviewed the results from the time of this admission to 8/18/2021 12:24 EDT and agree with these findings:  [x]  Laboratory  [x]  Microbiology  [x]  Radiology  [x]  EKG/Telemetry   []  Cardiology/Vascular   []  Pathology  []  Old records  []  Other:  Most notable findings include: potassium 2.4, BUN 27, mag 1.5, urinalysis showed 15 ketones, trace blood, positive nitrite, large leukocytes, 3-5 red blood cells, too numerous to count white blood cells, 3+ bacteria.      Assessment/Plan        Active Hospital Problems:  Active Hospital Problems    Diagnosis     **Cellulitis     Left arm pain     Left foot pain     Hypokalemia     Hypomagnesemia     Right arm cellulitis     OAB (overactive bladder)     Anxiety state     Benign essential HTN     Bipolar 1 disorder (CMS/[facility])     Hyperlipemia     Gastric reflux     Depressive disorder     CKD (chronic kidney disease), stage III (CMS/[facility])     Diabetes mellitus with neurological manifestation (CMS/[facility])      Plan:     Acute encephalopathy   - Secondary to UTI  - Currently alert and oriented X3 with some confusion noted   - UA reviewed  - Urine culture pending  - Received rocephin in ED  - Continue Rocpehin    Acute RLE cellulitis  - Venous duplex RLE ordered  - Continue rocephin above, (patient allergic to vancomycin)  - Obtain blood cultures  - Check ESR, lactic acid,  and CRP     Acute RUE Cellulitis    - Venous duplex ordered   - Treatment as above    Acute hypokalemia  - K 2.4, monitor  - Electrolyte protocol ordered     Acute hypomagnesemia  - Mag 1.5, monitor  - Electrolyte protocol ordered     Left arm pain   - Likely secondary to arthritis   - Reported from ECF   - X ray left humerus reviewed  - pain medication ordered    Left foot pain  - X ray left foot reviewed  - Reported from ECF  - pain medication ordered     Essential hypertension  -Uncontrolled blood pressure 169/71 upon admission  -Monitor blood pressure  -Continue amlodipine, lisinopril, and metolazone  - Holding home lasix   - IV hydralazine ordered PRN     Hyperlipidemia  -Continue zetia and statin    CKD stage III  - CR 1.03, monitor   -Continue calcitriol    Anxiety with depression  - Stable  -Continue Prozac, Topamax, depakote, and Abilify    Overactive bladder  -Continue Ditropan     GERD  -Continue PPI    Diabetes mellitus  - Start sliding scale, Accuchecks ACHS  - Check hbg A1C     Chronic pain  - Continue hydrocodone (INSPECT verified)     DVT prophylaxis:  No DVT prophylaxis order currently exists.    CODE STATUS:    Limited Support to NOT Include: Intubation; Cardioversion/Defibrillation  Level Of Support Discussed With: Patient  Code Status: No CPR  Medical Interventions (Level of Support Prior to Arrest): Limited    Admission Status:  I believe this patient meets Inpatient status.    I discussed the patient's findings and my recommendations with patient and nursing staff.    This patient has been examined wearing appropriate Personal Protective Equipment.  08/18/21      Signature: Electronically signed by CYN Mott, 08/18/21, 2:39 PM EDT.    I saw and examined this wonderful patient with Mrs. Hernandez.  I have reviewed the notes above and I agreed with the plan of care.    Patient is an 89-year-old female who presented to the emergency room with altered mental status and was diagnosed  Acute UTI, acute encephalopathy, and  a cellulitis.  Antibiotics were started after cultures were completed.    Physical examination  General: NAD.  HEENT: Head with no contusions.  Pupils equal and reactive to light  CVS: S1 and S2 present.  No tachycardia.  Pulmonary: Good air entry bilaterally.  No wheezing.  Abdomen: Soft nontender nondistended positive bowel sounds.  Extremities.  No edema.  Pulses intact.  Musculoskeletal: Positive extremities tenderness.  Hematology: No obvious signs of bleeding.  No jaundice.      Assessment and plan.    Treat acute UTI with antibiotics.  Treat cellulitis with antibiotics.  Treat your musculoskeletal pain syndrome with Roxicodone.  Treat acute encephalopathy with supportive care.  Complete neurology consult.  Continue appropriate patient's home medications.  Continue present level of care.  Patient and family agreed with the plan of care.

## 2021-08-18 NOTE — ED PROVIDER NOTES
Subjective   History:    89-year-old female presents emergency department today from her long-term care facility for evaluation of left arm pain, left foot pain, and confusion.  Patient reports that the pain started this morning but there was no known injury.  She is also reporting some swelling of the left upper arm.  She is alert and oriented to person place and time in the emergency department.    Onset: This morning  Location: Left mid upper arm, left lateral foot at base of small toe  Duration: Continuous  Character: Aching  Aggravating/Alleviating factors: Exacerbated with movement or palpation, alleviated with holding still  Radiation: None  Severity: Moderate            Review of Systems   Constitutional: Negative for appetite change, chills, fatigue and fever.   HENT: Negative for congestion, facial swelling, sinus pain and sore throat.    Eyes: Negative for pain and visual disturbance.   Respiratory: Negative for cough, chest tightness and shortness of breath.    Cardiovascular: Negative for chest pain and palpitations.   Gastrointestinal: Negative for constipation, diarrhea, nausea and vomiting.   Genitourinary: Negative for dysuria, flank pain, frequency and urgency.   Musculoskeletal: Positive for arthralgias. Negative for joint swelling and neck pain.   Skin: Negative for color change and rash.   Neurological: Negative for dizziness, seizures, syncope, weakness, light-headedness and headaches.   Psychiatric/Behavioral: Positive for confusion.       Past Medical History:   Diagnosis Date   • Allergic rhinitis    • Anemia    • Bipolar 1 disorder (CMS/HCC)    • Broken bones     cheek bone   • Bursitis of knee    • Calculus of bile duct    • Chronic kidney disease     STAGE 3   • Colon polyps    • Depression    • Dysuria    • Fundic gland polyposis of stomach 08/2014   • Gallstones 07/2014    gb sono-stone 7/14   • GERD (gastroesophageal reflux disease)    • H/O complete eye exam 2015   • H/O mammogram  2013   • History of dental examination 06/2016   • Hyperlipidemia    • Hypertension    • Infectious viral hepatitis    • Non-alcoholic fatty liver disease    • Polyp of stomach    • Spinal stenosis    • UTI (urinary tract infection)        Allergies   Allergen Reactions   • Morphine Other (See Comments)     Mood changes   • Nsaids    • Sulfa Antibiotics    • Vancomycin Rash       Past Surgical History:   Procedure Laterality Date   • BILATERAL BREAST REDUCTION     • CARDIAC CATHETERIZATION     • COLON SURGERY  08/2014    TUBULAR ADENOMA RESECTED   • COLONOSCOPY  2013   • ENDOSCOPY  08/2014    (Dr Huffman)   • ESOPHAGEAL DILATATION     • OTHER SURGICAL HISTORY  08/2014    bx stomach polyps and colon polyps       Family History   Problem Relation Age of Onset   • Colon cancer Mother    • Cancer Mother    • Stroke Father    • Arthritis Father    • Hypertension Father    • Ovarian cancer Sister    • Heart disease Sister    • Cancer Sister    • Diabetes Brother    • Arthritis Brother    • Hypertension Brother    • Colon cancer Sister    • Cancer Sister    • Adrenal disorder Other    • Breast cancer Other    • Brain cancer Other        Social History     Socioeconomic History   • Marital status: Single     Spouse name: Not on file   • Number of children: Not on file   • Years of education: Not on file   • Highest education level: Not on file   Tobacco Use   • Smoking status: Former Smoker   • Smokeless tobacco: Never Used   Substance and Sexual Activity   • Alcohol use: No   • Drug use: No           Objective   Physical Exam  Constitutional:       General: She is not in acute distress.     Appearance: Normal appearance.   HENT:      Head: Normocephalic and atraumatic.   Eyes:      Extraocular Movements: Extraocular movements intact.      Conjunctiva/sclera: Conjunctivae normal.      Pupils: Pupils are equal, round, and reactive to light.   Cardiovascular:      Rate and Rhythm: Normal rate and regular rhythm.      Heart  sounds: Murmur heard.     Pulmonary:      Effort: Pulmonary effort is normal.      Breath sounds: Normal breath sounds.   Abdominal:      General: Abdomen is flat. Bowel sounds are normal.      Palpations: Abdomen is soft.      Tenderness: There is no abdominal tenderness. There is no guarding.   Musculoskeletal:        Arms:       Cervical back: Normal range of motion and neck supple. No tenderness.      Right lower leg: Edema present.      Left lower leg: Edema present.        Feet:    Skin:     General: Skin is warm and dry.      Capillary Refill: Capillary refill takes less than 2 seconds.   Neurological:      Mental Status: She is alert.      Motor: No weakness.      Comments: Patient is oriented to person/place/time but is confused about some details such as how she slept last night and events of this am   Psychiatric:         Mood and Affect: Mood normal.         Behavior: Behavior normal.         Procedures           ED Course      Medications   sodium chloride 0.9 % flush 10 mL (has no administration in time range)   potassium chloride (K-DUR,KLOR-CON) CR tablet 40 mEq ( Oral Not Given:  See Alt 8/18/21 1126)     Or   potassium chloride (KLOR-CON) packet 40 mEq (40 mEq Oral Given 8/18/21 1126)     Or   potassium chloride 10 mEq in 100 mL IVPB ( Intravenous Not Given:  See Alt 8/18/21 1126)   sodium chloride 0.9 % bolus 1,000 mL (has no administration in time range)   Magnesium Sulfate 2 gram Bolus, followed by 8 gram infusion (total Mg dose 10 grams)- Mg less than or equal to 1mg/dL (has no administration in time range)     Or   Magnesium Sulfate 2 gram / 50mL Infusion (GIVE X 3 BAGS TO EQUAL 6GM TOTAL DOSE) - Mg 1.1 - 1.5 mg/dl (has no administration in time range)     Or   Magnesium Sulfate 4 gram infusion- Mg 1.6-1.9 mg/dL (has no administration in time range)   cefTRIAXone (ROCEPHIN) in SWFI 1 gram/10ml IV PUSH syringe (1 g Intravenous Given 8/18/21 1126)   acetaminophen (TYLENOL) tablet 1,000 mg (1,000  mg Oral Given 8/18/21 1126)     Labs Reviewed   COMPREHENSIVE METABOLIC PANEL - Abnormal; Notable for the following components:       Result Value    BUN 27 (*)     Creatinine 1.03 (*)     Potassium 2.4 (*)     Chloride 95 (*)     eGFR Non African Amer 50 (*)     BUN/Creatinine Ratio 26.2 (*)     All other components within normal limits    Narrative:     GFR Normal >60  Chronic Kidney Disease <60  Kidney Failure <15     URINALYSIS W/ CULTURE IF INDICATED - Abnormal; Notable for the following components:    Appearance, UA Cloudy (*)     Ketones, UA 15 mg/dL (1+) (*)     Blood, UA Trace (*)     Leuk Esterase, UA Large (3+) (*)     Nitrite, UA Positive (*)     All other components within normal limits   CBC WITH AUTO DIFFERENTIAL - Abnormal; Notable for the following components:    RDW 15.6 (*)     Neutrophil % 78.2 (*)     Lymphocyte % 7.9 (*)     Monocyte % 13.2 (*)     Eosinophil % 0.1 (*)     Neutrophils, Absolute 8.20 (*)     Monocytes, Absolute 1.40 (*)     All other components within normal limits   URINALYSIS, MICROSCOPIC ONLY - Abnormal; Notable for the following components:    RBC, UA 3-5 (*)     WBC, UA Too Numerous to Count (*)     Bacteria, UA 3+ (*)     All other components within normal limits   MAGNESIUM - Abnormal; Notable for the following components:    Magnesium 1.5 (*)     All other components within normal limits   POC LACTATE - Normal   URINE CULTURE   POC LACTATE   CBC AND DIFFERENTIAL    Narrative:     The following orders were created for panel order CBC & Differential.  Procedure                               Abnormality         Status                     ---------                               -----------         ------                     CBC Auto Differential[[id number]]        Abnormal            Final result                 Please view results for these tests on the individual orders.     XR Foot 3+ View Left   Final Result   Left foot degenerative changes with osteopenia. No acute left  foot   findings.       Electronically Signed By-Winter Jeronimo MD On:8/18/2021 10:09 AM   This report was finalized on [id number] by  Winter Jeronimo MD.      XR Humerus Left   Final Result   1. Negative for fracture or dislocation.   2. Advanced glenohumeral osteoarthritis.       Electronically Signed By-Nehemiah Brink MD On:8/18/2021 10:14 AM   This report was finalized on [id number] by  Nehemiah Brink MD.                                                MDM  Number of Diagnoses or Management Options  Acute cystitis with hematuria  Acute renal impairment  Hypokalemia  Hypomagnesemia  Diagnosis management comments: I examined the patient using the appropriate personal protective equipment.      DISPOSITION:   Chart Review:  Comorbidity:  has a past medical history of Allergic rhinitis, Anemia, Bipolar 1 disorder (CMS/HCC), Broken bones, Bursitis of knee, Calculus of bile duct, Chronic kidney disease, Colon polyps, Depression, Dysuria, Fundic gland polyposis of stomach (08/2014), Gallstones (07/2014), GERD (gastroesophageal reflux disease), H/O complete eye exam (2015), H/O mammogram (2013), History of dental examination (06/2016), Hyperlipidemia, Hypertension, Infectious viral hepatitis, Non-alcoholic fatty liver disease, Polyp of stomach, Spinal stenosis, and UTI (urinary tract infection).    ECG: interpreted by ER physician and reviewed by myself: Sinus rhythm with prolonged QT  Labs: Urinalysis positive for large leukocytes, nitrites, bacteria, WBCs, blood, lactate 0.7, BUN 27, creatinine 1.03, potassium 2.4, WBC 10.4, hemoglobin 12.4, magnesium 1.5    Imaging: Was interpreted by physician and reviewed by myself:  XR Humerus Left    Result Date: 8/18/2021  1. Negative for fracture or dislocation. 2. Advanced glenohumeral osteoarthritis.  Electronically Signed By-Nehemiah Brink MD On:8/18/2021 10:14 AM This report was finalized on [id number] by  Nehemiah Brink MD.    XR Foot 3+ View Left    Result Date:  8/18/2021  Left foot degenerative changes with osteopenia. No acute left foot findings.  Electronically Signed By-Winter Jeronimo MD On:8/18/2021 10:09 AM This report was finalized on [id number] by  Winter Jeronimo MD.      Disposition/Treatment:    89-year-old female presented to the emergency department today from her long-term care facility with complaints of left arm pain, left foot pain, confusion.  IV was established and labs were obtained and are as noted above.  X-rays of the left arm and left foot were performed and were found to be negative for acute changes but positive for arthritis.  Venous Doppler of the left upper extremity was also performed and found to be negative for clot.  Patient's potassium was critically low at 2.4.  Patient was ordered potassium replacement protocol and magnesium protocol as well.  Was given Rocephin and normal saline in the emergency department.  Patient will be admitted for treatment of UTI, electrolyte abnormality, acute kidney injury.  Spoke with hospitalist who is in agreement with admittance.       Amount and/or Complexity of Data Reviewed  Clinical lab tests: reviewed  Tests in the radiology section of CPT®: reviewed  Tests in the medicine section of CPT®: reviewed  Decide to obtain previous medical records or to obtain history from someone other than the patient: yes        Final diagnoses:   Acute cystitis with hematuria   Hypokalemia   Hypomagnesemia   Acute renal impairment       ED Disposition  ED Disposition     ED Disposition Condition Comment    Decision to Admit  Level of Care: Telemetry [5]   Admitting Physician: KALINA AYALA [[id number]]   Attending Physician: KALINA AYALA [[id number]]            No follow-up provider specified.       Medication List      No changes were made to your prescriptions during this visit.          Allison Orozco APRN  08/18/21 1138

## 2021-08-18 NOTE — PLAN OF CARE
Goal Outcome Evaluation:  Plan of Care Reviewed With: patient        Progress: no change  Outcome Summary: Admitted from ER with UTI.  Pt alert with some confusion.  V/S stable, niece at bedside.

## 2021-08-19 ENCOUNTER — APPOINTMENT (OUTPATIENT)
Dept: MRI IMAGING | Facility: HOSPITAL | Age: 86
End: 2021-08-19

## 2021-08-19 ENCOUNTER — APPOINTMENT (OUTPATIENT)
Dept: CT IMAGING | Facility: HOSPITAL | Age: 86
End: 2021-08-19

## 2021-08-19 LAB
ANION GAP SERPL CALCULATED.3IONS-SCNC: 11 MMOL/L (ref 5–15)
BUN SERPL-MCNC: 19 MG/DL (ref 8–23)
BUN/CREAT SERPL: 24.1 (ref 7–25)
CALCIUM SPEC-SCNC: 8.4 MG/DL (ref 8.6–10.5)
CHLORIDE SERPL-SCNC: 99 MMOL/L (ref 98–107)
CO2 SERPL-SCNC: 25 MMOL/L (ref 22–29)
CREAT SERPL-MCNC: 0.79 MG/DL (ref 0.57–1)
DEPRECATED RDW RBC AUTO: 50.8 FL (ref 37–54)
ERYTHROCYTE [DISTWIDTH] IN BLOOD BY AUTOMATED COUNT: 15.7 % (ref 12.3–15.4)
GFR SERPL CREATININE-BSD FRML MDRD: 69 ML/MIN/1.73
GLUCOSE SERPL-MCNC: 106 MG/DL (ref 65–99)
HCT VFR BLD AUTO: 34.3 % (ref 34–46.6)
HGB BLD-MCNC: 11.4 G/DL (ref 12–15.9)
MAGNESIUM SERPL-MCNC: 1.7 MG/DL (ref 1.6–2.4)
MCH RBC QN AUTO: 31.1 PG (ref 26.6–33)
MCHC RBC AUTO-ENTMCNC: 33.4 G/DL (ref 31.5–35.7)
MCV RBC AUTO: 93.1 FL (ref 79–97)
PLATELET # BLD AUTO: 248 10*3/MM3 (ref 140–450)
PMV BLD AUTO: 9.5 FL (ref 6–12)
POTASSIUM SERPL-SCNC: 2.9 MMOL/L (ref 3.5–5.2)
QT INTERVAL: 454 MS
RBC # BLD AUTO: 3.68 10*6/MM3 (ref 3.77–5.28)
SODIUM SERPL-SCNC: 135 MMOL/L (ref 136–145)
WBC # BLD AUTO: 12 10*3/MM3 (ref 3.4–10.8)

## 2021-08-19 PROCEDURE — 96372 THER/PROPH/DIAG INJ SC/IM: CPT

## 2021-08-19 PROCEDURE — 25010000002 HEPARIN (PORCINE) PER 1000 UNITS: Performed by: NURSE PRACTITIONER

## 2021-08-19 PROCEDURE — 70551 MRI BRAIN STEM W/O DYE: CPT

## 2021-08-19 PROCEDURE — 25010000003 MAGNESIUM SULFATE 4 GM/100ML SOLUTION: Performed by: NURSE PRACTITIONER

## 2021-08-19 PROCEDURE — 97162 PT EVAL MOD COMPLEX 30 MIN: CPT

## 2021-08-19 PROCEDURE — 96365 THER/PROPH/DIAG IV INF INIT: CPT

## 2021-08-19 PROCEDURE — 85027 COMPLETE CBC AUTOMATED: CPT | Performed by: NURSE PRACTITIONER

## 2021-08-19 PROCEDURE — 70450 CT HEAD/BRAIN W/O DYE: CPT

## 2021-08-19 PROCEDURE — 97165 OT EVAL LOW COMPLEX 30 MIN: CPT

## 2021-08-19 PROCEDURE — 80048 BASIC METABOLIC PNL TOTAL CA: CPT | Performed by: NURSE PRACTITIONER

## 2021-08-19 PROCEDURE — 83735 ASSAY OF MAGNESIUM: CPT | Performed by: NURSE PRACTITIONER

## 2021-08-19 PROCEDURE — 25010000002 CEFTRIAXONE PER 250 MG: Performed by: NURSE PRACTITIONER

## 2021-08-19 PROCEDURE — 96366 THER/PROPH/DIAG IV INF ADDON: CPT

## 2021-08-19 PROCEDURE — 99222 1ST HOSP IP/OBS MODERATE 55: CPT | Performed by: PSYCHIATRY & NEUROLOGY

## 2021-08-19 PROCEDURE — 99226 PR SBSQ OBSERVATION CARE/DAY 35 MINUTES: CPT | Performed by: INTERNAL MEDICINE

## 2021-08-19 PROCEDURE — 97530 THERAPEUTIC ACTIVITIES: CPT

## 2021-08-19 RX ADMIN — OXYBUTYNIN CHLORIDE 10 MG: 10 TABLET, EXTENDED RELEASE ORAL at 08:50

## 2021-08-19 RX ADMIN — ARIPIPRAZOLE 2 MG: 2 TABLET ORAL at 20:31

## 2021-08-19 RX ADMIN — HYDROCODONE BITARTRATE AND ACETAMINOPHEN 1 TABLET: 5; 325 TABLET ORAL at 08:50

## 2021-08-19 RX ADMIN — CEFTRIAXONE SODIUM 1 G: 1 INJECTION, POWDER, FOR SOLUTION INTRAMUSCULAR; INTRAVENOUS at 13:28

## 2021-08-19 RX ADMIN — DIVALPROEX SODIUM 125 MG: 125 TABLET, DELAYED RELEASE ORAL at 20:31

## 2021-08-19 RX ADMIN — PANTOPRAZOLE SODIUM 40 MG: 40 TABLET, DELAYED RELEASE ORAL at 06:16

## 2021-08-19 RX ADMIN — ATORVASTATIN CALCIUM 80 MG: 40 TABLET, FILM COATED ORAL at 20:31

## 2021-08-19 RX ADMIN — HEPARIN SODIUM 5000 UNITS: 5000 INJECTION INTRAVENOUS; SUBCUTANEOUS at 06:16

## 2021-08-19 RX ADMIN — HYDROCODONE BITARTRATE AND ACETAMINOPHEN 1 TABLET: 5; 325 TABLET ORAL at 16:14

## 2021-08-19 RX ADMIN — Medication 10 ML: at 20:32

## 2021-08-19 RX ADMIN — MAGNESIUM SULFATE HEPTAHYDRATE 4 G: 4 INJECTION, SOLUTION INTRAVENOUS at 06:17

## 2021-08-19 RX ADMIN — HEPARIN SODIUM 5000 UNITS: 5000 INJECTION INTRAVENOUS; SUBCUTANEOUS at 13:28

## 2021-08-19 RX ADMIN — METOLAZONE 5 MG: 5 TABLET ORAL at 08:50

## 2021-08-19 RX ADMIN — POTASSIUM CHLORIDE 40 MEQ: 1500 TABLET, EXTENDED RELEASE ORAL at 06:16

## 2021-08-19 RX ADMIN — FLUOXETINE 40 MG: 20 CAPSULE ORAL at 20:31

## 2021-08-19 RX ADMIN — POTASSIUM CHLORIDE 40 MEQ: 1.5 POWDER, FOR SOLUTION ORAL at 11:14

## 2021-08-19 RX ADMIN — HYDROCODONE BITARTRATE AND ACETAMINOPHEN 1 TABLET: 5; 325 TABLET ORAL at 20:31

## 2021-08-19 RX ADMIN — DIVALPROEX SODIUM 125 MG: 125 TABLET, DELAYED RELEASE ORAL at 08:50

## 2021-08-19 RX ADMIN — TOPIRAMATE 150 MG: 100 TABLET, FILM COATED ORAL at 20:31

## 2021-08-19 NOTE — PLAN OF CARE
Goal Outcome Evaluation:  Plan of Care Reviewed With: patient        Progress: no change  Outcome Summary: Pt admitted from assisted living facility secondary to AMS, weakness, UTI and L UE/LE pain. X-ray L humerus (-) fx, X-ray L foot (-) acute fx. LUE Doppler (-). CT C-spine (-) acute findings.  Pt. c/o of continued LUE pain and unable to achieve active flexion >60*, PROM up to 120* pain free. Pt. c/o of tingeling numbness LUE and has h/o of fall x 2 months ago stating she hurt her neck. Pt. w/ potential shoulder pathology, unable to determine and would require orthopedic consult. Pt. completes bed mobility w/ mod A and complete assist for all LB ADLs. Pt. unable to return to assisted living at this time and will require IP rehab at d/c to address aforementioned deficits. Will follow up w/ pt. 1-3x per week at [facility].

## 2021-08-19 NOTE — PLAN OF CARE
Goal Outcome Evaluation:  Plan of Care Reviewed With: patient        Progress: no change  Outcome Summary: Pt admitted from [facility] d/t altered mental status and weakness. pt has Uti. pt remainds forgetful. pt reminded to turn. no new complaints at this time.

## 2021-08-19 NOTE — CASE MANAGEMENT/SOCIAL WORK
Discharge Planning Assessment  [facility]     Patient Name: Sarah Zendejas  MRN: [id number]  Today's Date: 8/19/2021    Admit Date: 8/18/2021    Discharge Needs Assessment     Row Name 08/19/21 1311       Living Environment    Lives With  alone    Current Living Arrangements  independent/assisted living facility [facility]    Primary Care Provided by  self    Provides Primary Care For  no one, unable/limited ability to care for self    Family Caregiver if Needed  other relative(s)    Family Caregiver Names  millie Petty    Quality of Family Relationships  helpful    Able to Return to Prior Arrangements  other (see comments) pending PT/OT eval       Resource/Environmental Concerns    Resource/Environmental Concerns  none    Transportation Concerns  car, none       Transition Planning    Patient/Family Anticipates Transition to  inpatient rehabilitation facility    Patient/Family Anticipated Services at Transition  rehabilitation services    Transportation Anticipated  family or friend will provide;health plan transportation       Discharge Needs Assessment    Readmission Within the Last 30 Days  no previous admission in last 30 days    Current Outpatient/Agency/Support Group  assisted living facility    Equipment Currently Used at Home  walker, rolling    Concerns to be Addressed  discharge planning    Anticipated Changes Related to Illness  inability to care for self    Discharge Facility/Level of Care Needs  rehabilitation facility    Provided Post Acute Provider List?  Yes    Post Acute Provider List  Inpatient Rehab    Delivered To  Patient;Support Person    Support Person  millie Petty    Method of Delivery  In person    Current Discharge Risk  physical impairment;lives alone        Discharge Plan     Row Name 08/19/21 1312       Plan    Plan  PT/OT eval ordered. From [facility], current with ESTEPHANIA Catherine order placed.    Patient/Family in Agreement with Plan  yes    Plan Comments  Met with patient  and family at bedside, reports she lives at [facility] and current with [facility] . Informed liaison and ESTEPHANIA order placed. Brandyn Petty to provide transportation on d/c. PCP and pharmacy confirmed. No issues affording food or medications. Discussed likely need for IP rehab, list provided. Will follow. PT/OT eval ordered.        Continued Care and Services - Admitted Since 8/18/2021     Home Medical Care Coordination complete.    Service Provider Request Status Selected Services Address Phone Fax Patient Preferred    [facility]   Selected Home Health Services [address] [place] [address] [place] [id number] [phone] -- --              Expected Discharge Date and Time     Expected Discharge Date Expected Discharge Time    Aug 23, 2021         Demographic Summary     Row Name 08/19/21 1310       General Information    Admission Type  inpatient    Arrived From  emergency department    Required Notices Provided  Important Message from Medicare    Referral Source  admission list    Reason for Consult  discharge planning    Preferred Language  English        Functional Status     Row Name 08/19/21 1311       Functional Status    Usual Activity Tolerance  moderate    Current Activity Tolerance  poor       Functional Status, IADL    Medications  assistive person    Meal Preparation  assistive person    Housekeeping  assistive person    Laundry  assistive person    Shopping  assistive person          Patient Forms     Row Name 08/19/21 1323       Patient Forms    Important Message from Medicare ([facility])  Delivered    Delivered to  Patient    Method of delivery  In person        Met with patient in room wearing PPE: mask     Maintained distance greater than six feet and spent less than 15 minutes in the room.          Dominique Mcgregor RN

## 2021-08-19 NOTE — CONSULTS
Primary Care Provider: Provider, No Known     Consult requested by:   Arturo Miller MD    Reason for Consultation: Neurological evaluation for mental status changes.     Sarah Zendejas is a 89 y.o. female *    History taken from: patient chart family RN    Chief complaint:   Mental status changes.        SUBJECTIVE:    History of present illness:   The patient is a 89 year old lady with multiple medical problems including anemia, Hypertension, HLD, DM, depression and bipolar disorder who presented to ER of [facility] secondary to left arm, foot pain and confusion.  The confusion seems to have resolved.  She is aware of surroundings, able to answer questions and follow commands.  The patient states that she has some weakness of the left arm.  The patient denies double vision, speech and swallowing problems, bowel and bladder incontinence.     Review of Systems   Constitutional: Negative  HENT: Negative.    Eyes: Negative.    Respiratory: Negative.    Cardiovascular: history of chest discomfort.    Gastrointestinal: Negative.    Genitourinary: Negative.    Musculoskeletal: left arm pain.  Skin: Negative.    Neurological: Negative.    Hematological: Negative.    Psychiatric/Behavioral: depression and bipolar disorder.        PATIENT HISTORY:  Past Medical History:   Diagnosis Date   • Allergic rhinitis    • Anemia    • Bipolar 1 disorder (CMS/HCC)    • Broken bones     cheek bone   • Bursitis of knee    • Calculus of bile duct    • Chronic kidney disease     STAGE 3   • Colon polyps    • Depression    • Dysuria    • Fundic gland polyposis of stomach 08/2014   • Gallstones 07/2014    gb sono-stone 7/14   • GERD (gastroesophageal reflux disease)    • H/O complete eye exam 2015   • H/O mammogram 2013   • History of dental examination 06/2016   • Hyperlipidemia    • Hypertension    • Infectious viral hepatitis    • Non-alcoholic fatty liver disease    • Polyp of stomach    • Spinal stenosis    • UTI (urinary tract infection)     ,   Past Surgical History:   Procedure Laterality Date   • BILATERAL BREAST REDUCTION     • CARDIAC CATHETERIZATION     • COLON SURGERY  08/2014    TUBULAR ADENOMA RESECTED   • COLONOSCOPY  2013   • ENDOSCOPY  08/2014    (Dr Huffman)   • ESOPHAGEAL DILATATION     • OTHER SURGICAL HISTORY  08/2014    bx stomach polyps and colon polyps   ,   Family History   Problem Relation Age of Onset   • Colon cancer Mother    • Cancer Mother    • Stroke Father    • Arthritis Father    • Hypertension Father    • Ovarian cancer Sister    • Heart disease Sister    • Cancer Sister    • Diabetes Brother    • Arthritis Brother    • Hypertension Brother    • Colon cancer Sister    • Cancer Sister    • Adrenal disorder Other    • Breast cancer Other    • Brain cancer Other    ,   Social History     Tobacco Use   • Smoking status: Former Smoker   • Smokeless tobacco: Never Used   Substance Use Topics   • Alcohol use: No   • Drug use: No   ,   Medications Prior to Admission   Medication Sig Dispense Refill Last Dose   • acetaminophen (TYLENOL) 325 MG tablet Take 650 mg by mouth 2 (Two) Times a Day As Needed for Mild Pain .      • ARIPiprazole (ABILIFY) 2 MG tablet Take 2 mg by mouth Every Night.   8/17/2021   • atorvastatin (LIPITOR) 80 MG tablet Take 1 tablet by mouth Every Night. 90 tablet 0 8/17/2021 at Unknown time   • benzonatate (TESSALON) 100 MG capsule Take 100 mg by mouth Every 8 (Eight) Hours As Needed for Cough.      • calcium carb-cholecalciferol (Calcium 600+D3) 600-800 MG-UNIT tablet Take 1 tablet by mouth 2 (two) times a day.   8/17/2021 at Unknown time   • divalproex (DEPAKOTE) 125 MG DR tablet Take 125 mg by mouth 2 (Two) Times a Day.   8/17/2021 at Unknown time   • ezetimibe (Zetia) 10 MG tablet Take 10 mg by mouth Daily.   8/17/2021 at Unknown time   • FLUoxetine (PROzac) 40 MG capsule Take 40 mg by mouth Every Night.   8/17/2021 at Unknown time   • fluticasone (FLONASE) 50 MCG/ACT nasal spray 2 sprays by Each Nare  route Daily As Needed.      • furosemide (LASIX) 40 MG tablet Take 40 mg by mouth Daily.   8/17/2021 at Unknown time   • HYDROcodone-acetaminophen (NORCO) 5-325 MG per tablet Take 1 tablet by mouth 3 (Three) Times a Day.   8/17/2021 at Unknown time   • loperamide (Loperamide A-D) 2 MG tablet Take 2 mg by mouth Every 6 (Six) Hours As Needed for Diarrhea.      • Menthol, Topical Analgesic, (Biofreeze) 4 % gel Apply 1 application topically 3 (Three) Times a Day As Needed. Apply to neck      • metOLazone (ZAROXOLYN) 2.5 MG tablet Take 2.5 mg by mouth Daily.   8/17/2021 at Unknown time   • metOLazone (ZAROXOLYN) 5 MG tablet Take 5 mg by mouth Daily.   8/17/2021 at Unknown time   • Mirabegron ER (MYRBETRIQ) 50 MG tablet sustained-release 24 hour 24 hr tablet Take 50 mg by mouth Daily.   8/17/2021 at Unknown time   • omeprazole (priLOSEC) 20 MG capsule TAKE ONE CAPSULE BY MOUTH EVERY DAY 90 capsule 0 8/17/2021 at Unknown time   • ondansetron (ZOFRAN) 4 MG tablet Take 4 mg by mouth Every 6 (Six) Hours As Needed for Nausea or Vomiting.      • polyethylene glycol (MIRALAX) 17 GM/SCOOP powder Take 15 g by mouth Daily As Needed.      • polyvinyl alcohol (Artificial Tears) 1.4 % ophthalmic solution Administer 1 drop to both eyes Every 4 (Four) Hours As Needed for Dry Eyes.      • potassium chloride (K-DUR,KLOR-CON) 10 MEQ CR tablet Take 10 mEq by mouth 2 (Two) Times a Day.   8/17/2021 at Unknown time   • topiramate (TOPAMAX) 50 MG tablet Take 150 mg by mouth every night at bedtime.   8/17/2021 at Unknown time   • traZODone (DESYREL) 50 MG tablet Take 25-50 mg by mouth Every Night.   8/17/2021 at Unknown time   , Scheduled Meds:  ARIPiprazole, 2 mg, Oral, Nightly  atorvastatin, 80 mg, Oral, Nightly  cefTRIAXone, 1 g, Intravenous, Q24H  divalproex, 125 mg, Oral, BID  ezetimibe, 10 mg, Oral, Daily  FLUoxetine, 40 mg, Oral, Nightly  heparin (porcine), 5,000 Units, Subcutaneous, Q8H  HYDROcodone-acetaminophen, 1 tablet, Oral,  TID  metOLazone, 5 mg, Oral, Daily  oxybutynin XL, 10 mg, Oral, Daily  pantoprazole, 40 mg, Oral, QAM  sodium chloride, 10 mL, Intravenous, Q12H  topiramate, 150 mg, Oral, Nightly    , Continuous Infusions:   , PRN Meds:  •  acetaminophen **OR** acetaminophen **OR** acetaminophen  •  benzonatate  •  hydrALAZINE  •  magnesium sulfate **OR** magnesium sulfate **OR** magnesium sulfate  •  Menthol  •  ondansetron **OR** ondansetron  •  ondansetron  •  polyethyl glycol-propyl glycol  •  potassium chloride **OR** potassium chloride **OR** potassium chloride  •  [COMPLETED] Insert peripheral IV **AND** sodium chloride  •  sodium chloride  •  traZODone, Allergies:  Morphine, Nsaids, Sulfa antibiotics, and Vancomycin    ________________________________________________________        OBJECTIVE:  Upon today's exam:   The patient is lying in bed in no apparent distress. Head NC, AT, Neck supple, trachea midline. Lungs CTA, good pulmonary effort. CV  S1-S2 no murmur.  Abdomen soft, non tender.  Ext no edema, no cyanosis.           Neurologic Exam  The patient is awake, alert, oriented to hospital, city, month, year, follow commands, name common objects.  CN VFFC, EOMI, no facial droop. Tongue midline.  Motor right upper and lower extremities 5/5.  Left upper extremity 4+/5, LLE 5-/5.  Reflexes absent, plantar mute.  Sensory light touch intact.  Cerebellum and gait is deferred secondary to patient's request.   ________________________________________________________   RESULTS REVIEW:    VITAL SIGNS:   Temp:  [98.1 °F (36.7 °C)-99.1 °F (37.3 °C)] 98.1 °F (36.7 °C)  Heart Rate:  [73-84] 73  Resp:  [16-20] 16  BP: (135-153)/(69-83) 147/69     LABS:  WBC   Date Value Ref Range Status   08/19/2021 12.00 (H) 3.40 - 10.80 10*3/mm3 Final     RBC   Date Value Ref Range Status   08/19/2021 3.68 (L) 3.77 - 5.28 10*6/mm3 Final     Hemoglobin   Date Value Ref Range Status   08/19/2021 11.4 (L) 12.0 - 15.9 g/dL Final     Hematocrit   Date  Value Ref Range Status   08/19/2021 34.3 34.0 - 46.6 % Final     MCV   Date Value Ref Range Status   08/19/2021 93.1 79.0 - 97.0 fL Final     MCH   Date Value Ref Range Status   08/19/2021 31.1 26.6 - 33.0 pg Final     MCHC   Date Value Ref Range Status   08/19/2021 33.4 31.5 - 35.7 g/dL Final     RDW   Date Value Ref Range Status   08/19/2021 15.7 (H) 12.3 - 15.4 % Final     RDW-SD   Date Value Ref Range Status   08/19/2021 50.8 37.0 - 54.0 fl Final     MPV   Date Value Ref Range Status   08/19/2021 9.5 6.0 - 12.0 fL Final     Platelets   Date Value Ref Range Status   08/19/2021 248 140 - 450 10*3/mm3 Final     Neutrophil %   Date Value Ref Range Status   08/18/2021 78.2 (H) 42.7 - 76.0 % Final     Lymphocyte %   Date Value Ref Range Status   08/18/2021 7.9 (L) 19.6 - 45.3 % Final     Monocyte %   Date Value Ref Range Status   08/18/2021 13.2 (H) 5.0 - 12.0 % Final     Eosinophil %   Date Value Ref Range Status   08/18/2021 0.1 (L) 0.3 - 6.2 % Final     Basophil %   Date Value Ref Range Status   08/18/2021 0.6 0.0 - 1.5 % Final     Neutrophils, Absolute   Date Value Ref Range Status   08/18/2021 8.20 (H) 1.70 - 7.00 10*3/mm3 Final     Lymphocytes, Absolute   Date Value Ref Range Status   08/18/2021 0.80 0.70 - 3.10 10*3/mm3 Final     Monocytes, Absolute   Date Value Ref Range Status   08/18/2021 1.40 (H) 0.10 - 0.90 10*3/mm3 Final     Eosinophils, Absolute   Date Value Ref Range Status   08/18/2021 0.00 0.00 - 0.40 10*3/mm3 Final     Basophils, Absolute   Date Value Ref Range Status   08/18/2021 0.10 0.00 - 0.20 10*3/mm3 Final     nRBC   Date Value Ref Range Status   08/18/2021 0.0 0.0 - 0.2 /100 WBC Final     Glucose   Date Value Ref Range Status   08/19/2021 106 (H) 65 - 99 mg/dL Final     BUN   Date Value Ref Range Status   08/19/2021 19 8 - 23 mg/dL Final     Creatinine   Date Value Ref Range Status   08/19/2021 0.79 0.57 - 1.00 mg/dL Final     Sodium   Date Value Ref Range Status   08/19/2021 135 (L) 136 - 145  mmol/L Final     Potassium   Date Value Ref Range Status   08/19/2021 2.9 (L) 3.5 - 5.2 mmol/L Final     Comment:     Slight hemolysis detected by analyzer. Results may be affected.     Chloride   Date Value Ref Range Status   08/19/2021 99 98 - 107 mmol/L Final     CO2   Date Value Ref Range Status   08/19/2021 25.0 22.0 - 29.0 mmol/L Final     Calcium   Date Value Ref Range Status   08/19/2021 8.4 (L) 8.6 - 10.5 mg/dL Final     Total Protein   Date Value Ref Range Status   08/18/2021 7.0 6.0 - 8.5 g/dL Final     Albumin   Date Value Ref Range Status   08/18/2021 3.50 3.50 - 5.20 g/dL Final     ALT (SGPT)   Date Value Ref Range Status   08/18/2021 8 1 - 33 U/L Final     AST (SGOT)   Date Value Ref Range Status   08/18/2021 21 1 - 32 U/L Final     Alkaline Phosphatase   Date Value Ref Range Status   08/18/2021 101 39 - 117 U/L Final     Total Bilirubin   Date Value Ref Range Status   08/18/2021 0.6 0.0 - 1.2 mg/dL Final     eGFR Non  Amer   Date Value Ref Range Status   08/19/2021 69 >60 mL/min/1.73 Final     BUN/Creatinine Ratio   Date Value Ref Range Status   08/19/2021 24.1 7.0 - 25.0 Final     Anion Gap   Date Value Ref Range Status   08/19/2021 11.0 5.0 - 15.0 mmol/L Final       Lab Results   Component Value Date    TSH 2.287 12/21/2016    LDL 81 04/09/2018    HGBA1C 5.8 (H) 08/18/2021    [id number] 543 12/21/2016         IMAGING STUDIES:  XR Humerus Left    Result Date: 8/18/2021  1. Negative for fracture or dislocation. 2. Advanced glenohumeral osteoarthritis.  Electronically Signed By-Nehemiah Brink MD On:8/18/2021 10:14 AM This report was finalized on [id number] by  Nehemiah Brink MD.    XR Foot 3+ View Left    Result Date: 8/18/2021  Left foot degenerative changes with osteopenia. No acute left foot findings.  Electronically Signed By-Winter Jeronimo MD On:8/18/2021 10:09 AM This report was finalized on [id number] by  Winter Jeronimo MD.      I reviewed the patient's new clinical  results.      ________________________________________________________     PROBLEM LIST:    Acute encephalopathy    CKD (chronic kidney disease), stage III (CMS/[facility])    Bipolar 1 disorder (CMS/[facility])    Anxiety state    Depressive disorder    Hyperlipemia    Benign essential HTN    Gastric reflux    Diabetes mellitus with neurological manifestation (CMS/[facility])    OAB (overactive bladder)    Left arm pain    Left foot pain    Hypokalemia    Hypomagnesemia    Cellulitis    Right arm cellulitis    Acute UTI (urinary tract infection)          Assessment/Plan   ASSESSMENT/PLAN:  The patient is a 89 year old lady with multiple medical medical problems including CKD, Bipolar disorder, depression, HTN, HLD who presented with left arm pain and ?weakness as well as confusion.  The confusion is resolved may be secondary to metabolic encephalopathy.  Left sided weakness which is mild in nature.    Rec:  Will obtain a MRI of Brain without contrast to evaluate for possible stroke.  Will continue supportive care.  Will follow.     Modification of stroke risk factors:   - Blood pressure should be less than 130/80 outpatient, HbA1c less than 6.5, LDL less than 70; b12>500 and smoking cessation if applicable. We would be grateful if the primary team / primary care physician would keep a close watch on the above targets.  - Stroke education  - Follow up with neurologist of choice      I discussed the patient's findings and my recommendations with patient and family    Carlos Wu MD  08/19/21  11:46 EDT

## 2021-08-19 NOTE — THERAPY EVALUATION
Patient Name: Sarah Zendejas  : 1931    MRN: [id number]                              Today's Date: 2021       Admit Date: 2021    Visit Dx:     ICD-10-CM ICD-9-CM   1. Acute cystitis with hematuria  N30.01 595.0   2. Hypokalemia  E87.6 276.8   3. Hypomagnesemia  E83.42 275.2   4. Acute renal impairment  N28.9 593.9     Patient Active Problem List   Diagnosis   • Fatty liver disease, nonalcoholic   • CKD (chronic kidney disease), stage III (CMS/HCC)   • Bipolar 1 disorder (CMS/HCC)   • Rhinitis, allergic   • Anxiety state   • Depressive disorder   • Hyperlipemia   • Benign essential HTN   • Abnormal glucose   • Bone/cartilage disorder   • Routine general medical examination at a health care facility   • Breast tenderness in female   • Screening for malignant neoplasm of cervix   • Encounter for routine gynecological examination   • Calculus of bile duct   • Gallstone   • Bladder spasm   • Weight loss   • Bursitis of knee   • Gastric reflux   • Constipated   • Diabetes mellitus with neurological manifestation (CMS/HCC)   • Cells and casts in urine   • OAB (overactive bladder)   • Left arm pain   • Left foot pain   • Hypokalemia   • Hypomagnesemia   • Cellulitis   • Right arm cellulitis   • Acute encephalopathy   • Acute UTI (urinary tract infection)     Past Medical History:   Diagnosis Date   • Allergic rhinitis    • Anemia    • Bipolar 1 disorder (CMS/HCC)    • Broken bones     cheek bone   • Bursitis of knee    • Calculus of bile duct    • Chronic kidney disease     STAGE 3   • Colon polyps    • Depression    • Dysuria    • Fundic gland polyposis of stomach 2014   • Gallstones 2014    gb sono-stone    • GERD (gastroesophageal reflux disease)    • H/O complete eye exam    • H/O mammogram    • History of dental examination 2016   • Hyperlipidemia    • Hypertension    • Infectious viral hepatitis    • Non-alcoholic fatty liver disease    • Polyp of stomach    • Spinal stenosis     • UTI (urinary tract infection)      Past Surgical History:   Procedure Laterality Date   • BILATERAL BREAST REDUCTION     • CARDIAC CATHETERIZATION     • COLON SURGERY  08/2014    TUBULAR ADENOMA RESECTED   • COLONOSCOPY  2013   • ENDOSCOPY  08/2014    (Dr Huffman)   • ESOPHAGEAL DILATATION     • OTHER SURGICAL HISTORY  08/2014    bx stomach polyps and colon polyps     General Information     [facility] Name 08/19/21 1341          Physical Therapy Time and Intention    Document Type  evaluation  -AM     [facility] Name 08/19/21 1341          General Information    Patient Profile Reviewed  yes  -AM     Prior Level of Function  independent:;gait;transfer;bed mobility ambulates with rollator  -AM     Existing Precautions/Restrictions  fall  -AM     Barriers to Rehab  none identified  -AM     [facility] Name 08/19/21 1341          Living Environment    Lives With  alone assisted living facility  -AM     [facility] Name 08/19/21 1341          Stairs Within Home, Primary    Number of Stairs, Within Home, Primary  none  -AM     [facility] Name 08/19/21 1341          Cognition    Orientation Status (Cognition)  oriented x 3  -AM     Row Name 08/19/21 1341          Safety Issues, Functional Mobility    Impairments Affecting Function (Mobility)  balance;endurance/activity tolerance;pain;range of motion (ROM);strength  -AM     Comment, Safety Issues/Impairments (Mobility)  gait belt utilized  -AM       User Key  (r) = Recorded By, (t) = Taken By, (c) = Cosigned By    Initials Name Provider Type    AM Adrián Veloz, PT Physical Therapist        Mobility     [facility] Name 08/19/21 1343          Bed Mobility    Bed Mobility  supine-sit  -AM     All Activities, Summers (Bed Mobility)  set up;verbal cues;moderate assist (50% patient effort);1 person assist  -AM     Assistive Device (Bed Mobility)  bed rails;head of bed elevated  -AM     [facility] Name 08/19/21 1343          Sit-Stand Transfer    Sit-Stand Summers (Transfers)  set up;verbal cues;minimum assist  (75% patient effort);moderate assist (50% patient effort);2 person assist sit<>stand bed.  stand-pivot bed to b/s commode  -AM     Assistive Device (Sit-Stand Transfers)  walker, front-wheeled  -AM     Row Name 08/19/21 1343          Gait/Stairs (Locomotion)    Ames Level (Gait)  set up;verbal cues;minimum assist (75% patient effort);1 person assist  -AM     Assistive Device (Gait)  walker, 4-wheeled  -AM     Distance in Feet (Gait)  10'  -AM     Deviations/Abnormal Patterns (Gait)  base of support, narrow;charanjit decreased;gait speed decreased neck flexed to chest, required assist to place L hand on RW  -AM     Bilateral Gait Deviations  forward flexed posture  -AM       User Key  (r) = Recorded By, (t) = Taken By, (c) = Cosigned By    Initials Name Provider Type    AM Adrián Veloz PT Physical Therapist        Obj/Interventions     [facility] Name 08/19/21 1345          Range of Motion Comprehensive    Comment, General Range of Motion  L shoulder flex/abd limited to 70 degrees.  -AM     Row Name 08/19/21 1345          Strength Comprehensive (MMT)    Comment, General Manual Muscle Testing (MMT) Assessment  Pt unable to tolerate MMT on L shoulder and L foot secondary to pain.  RUE: 4/5.   L elbow/hand 2/5.  B LEs: 4/5 throughout  -AM     [facility] Name 08/19/21 1345          Balance    Balance Assessment  sitting static balance;sitting dynamic balance;standing static balance;standing dynamic balance  -AM     Static Sitting Balance  WFL  -AM     Dynamic Sitting Balance  mild impairment  -AM     Static Standing Balance  mild impairment  -AM     Dynamic Standing Balance  mild impairment  -AM     Row Name 08/19/21 1345          Sensory Assessment (Somatosensory)    Sensory Assessment (Somatosensory)  sensation intact  -AM       User Key  (r) = Recorded By, (t) = Taken By, (c) = Cosigned By    Initials Name Provider Type    AM Adrián Veloz, PT Physical Therapist        Goals/Plan     Row Name 08/19/21 1358          Bed  Mobility Goal 1 (PT)    Activity/Assistive Device (Bed Mobility Goal 1, PT)  bed mobility activities, all  -AM     Arthur Level/Cues Needed (Bed Mobility Goal 1, PT)  modified independence  -AM     Time Frame (Bed Mobility Goal 1, PT)  long term goal (LTG)  -AM     Row Name 08/19/21 1355          Transfer Goal 1 (PT)    Activity/Assistive Device (Transfer Goal 1, PT)  transfers, all  -AM     Arthur Level/Cues Needed (Transfer Goal 1, PT)  modified independence  -AM     Time Frame (Transfer Goal 1, PT)  long term goal (LTG)  -AM     Row Name 08/19/21 1351          Gait Training Goal 1 (PT)    Activity/Assistive Device (Gait Training Goal 1, PT)  gait (walking locomotion);assistive device use  -AM     Arthur Level (Gait Training Goal 1, PT)  modified independence  -AM     Distance (Gait Training Goal 1, PT)  150'  -AM     Time Frame (Gait Training Goal 1, PT)  long term goal (LTG)  -AM       User Key  (r) = Recorded By, (t) = Taken By, (c) = Cosigned By    Initials Name Provider Type    AM Adrián Veloz, PT Physical Therapist        Clinical Impression     Row Name 08/19/21 1346          Pain    Additional Documentation  Pain Scale: Numbers Pre/Post-Treatment (Group)  -AM     [facility] Name 08/19/21 1346          Pain Scale: Numbers Pre/Post-Treatment    Pretreatment Pain Rating  5/10  -AM     Posttreatment Pain Rating  5/10  -AM     Pain Location - Side  Left  -AM     Pain Location  shoulder  -AM     Pain Intervention(s)  Emotional support  -AM     Row Name 08/19/21 6790          Plan of Care Review    Plan of Care Reviewed With  patient;daughter  -AM     Outcome Summary  Pt admitted from assisted living facility secondary to AMS, weakness, UTI and L UE/LE pain.  X-ray L humerus (-) fx, X-ray L foot (-) acute fx.  LUE Doppler (-).  CT C-spine (-) acute findings.  Pt unable to hold head in midline for more than 30 seconds at a time when sitting EOB.  Head is flexed, rotated to R and tilted towards L side.   Daughter reports neck weakness has occurred for past 6 months.  Pt required mod A for bed mobility, mod A x 2 for transfers and ambulated 10' with min A but required therapist to place L hand on RW due to weakness  At end of session, pt was sitting EOB ready to lie back down and requested to use commode.  Stand-pivot transfer bed to b/s commode: mod A x 2.  Pt requesting increase time on b/s commode.  Left pt sitting on commode with daughter present, call light within reach and notified nsg.  Observed LUE swollen and name band tight across wrist- alerted RN that new band needs to be place.  Recommend inpt rehab  -AM     Row Name 08/19/21 1346          Therapy Assessment/Plan (PT)    Patient/Family Therapy Goals Statement (PT)  to go to rehab to get stronger  -AM     Rehab Potential (PT)  good, to achieve stated therapy goals  -AM     Criteria for Skilled Interventions Met (PT)  yes  -AM     Predicted Duration of Therapy Intervention (PT)  until d/c  -AM     Row Name 08/19/21 1346          Vital Signs    O2 Delivery Pre Treatment  room air  -AM     O2 Delivery Intra Treatment  room air  -AM     O2 Delivery Post Treatment  room air  -AM     Row Name 08/19/21 1346          Positioning and Restraints    Pre-Treatment Position  in bed  -AM     Post Treatment Position  bsc  -AM     On BS commode  notified nsg;call light within reach;encouraged to call for assist;with family/caregiver  -AM       User Key  (r) = Recorded By, (t) = Taken By, (c) = Cosigned By    Initials Name Provider Type    AM Adrián Veloz, PT Physical Therapist        Outcome Measures     Row Name 08/19/21 4035          How much help from another person do you currently need...    Turning from your back to your side while in flat bed without using bedrails?  3  -AM     Moving from lying on back to sitting on the side of a flat bed without bedrails?  2  -AM     Moving to and from a bed to a chair (including a wheelchair)?  2  -AM     Standing up from a  chair using your arms (e.g., wheelchair, bedside chair)?  2  -AM     Climbing 3-5 steps with a railing?  1  -AM     To walk in hospital room?  1  -AM     AM-PAC 6 Clicks Score (PT)  11  -AM     Row Name 08/19/21 1359          Functional Assessment    Outcome Measure Options  AM-PAC 6 Clicks Basic Mobility (PT)  -AM       User Key  (r) = Recorded By, (t) = Taken By, (c) = Cosigned By    Initials Name Provider Type    AM Adrián Veloz PT Physical Therapist                       Physical Therapy Education                 Title: PT OT SLP Therapies (In Progress)     Topic: Physical Therapy (In Progress)     Point: Mobility training (Done)     Learning Progress Summary           Patient Acceptance, E,TB, VU by AM at 8/19/2021 1359   Family Acceptance, E,TB, VU by AM at 8/19/2021 1359                   Point: Home exercise program (Not Started)     Learner Progress:  Not documented in this visit.          Point: Body mechanics (Not Started)     Learner Progress:  Not documented in this visit.          Point: Precautions (Not Started)     Learner Progress:  Not documented in this visit.                      User Key     Initials Effective Dates Name Provider Type Discipline    AM 05/10/21 -  Adrián Veloz PT Physical Therapist PT              PT Recommendation and Plan  Planned Therapy Interventions (PT): balance training, bed mobility training, gait training, patient/family education, strengthening, transfer training  Plan of Care Reviewed With: patient, daughter  Outcome Summary: Pt admitted from assisted living facility secondary to AMS, weakness, UTI and L UE/LE pain.  X-ray L humerus (-) fx, X-ray L foot (-) acute fx.  LUE Doppler (-).  CT C-spine (-) acute findings.  Pt unable to hold head in midline for more than 30 seconds at a time when sitting EOB.  Head is flexed, rotated to R and tilted towards L side.  Daughter reports neck weakness has occurred for past 6 months.  Pt required mod A for bed mobility, mod A  x 2 for transfers and ambulated 10' with min A but required therapist to place L hand on RW due to weakness  At end of session, pt was sitting EOB ready to lie back down and requested to use commode.  Stand-pivot transfer bed to b/s commode: mod A x 2.  Pt requesting increase time on b/s commode.  Left pt sitting on commode with daughter present, call light within reach and notified nsg.  Observed LUE swollen and name band tight across wrist- alerted RN that new band needs to be place.  Recommend inpt rehab     Time Calculation:   PT Charges     Row Name 08/19/21 1400             Time Calculation    Start Time  1120  -AM      Stop Time  1143  -AM      Time Calculation (min)  23 min  -AM      PT Received On  08/19/21  -AM      PT - Next Appointment  08/20/21  -AM      PT Goal Re-Cert Due Date  09/02/21  -AM         Time Calculation- PT    Total Timed Code Minutes- PT  13 minute(s)  -AM        User Key  (r) = Recorded By, (t) = Taken By, (c) = Cosigned By    Initials Name Provider Type    Adrián Bonner, PT Physical Therapist        Therapy Charges for Today     Code Description Service Date Service Provider Modifiers Qty    52370292271 HC PT EVAL MOD COMPLEXITY 3 8/19/2021 Adrián Veloz, PT GP 1    74667567874 HC PT THERAPEUTIC ACT EA 15 MIN 8/19/2021 Adrián Veloz, PT GP 1          PT G-Codes  Outcome Measure Options: AM-PAC 6 Clicks Basic Mobility (PT)  AM-PAC 6 Clicks Score (PT): 11    Adrián Veloz PT  8/19/2021

## 2021-08-19 NOTE — THERAPY EVALUATION
Patient Name: Sarah Zendejas  : 1931    MRN: [id number]                              Today's Date: 2021       Admit Date: 2021    Visit Dx:     ICD-10-CM ICD-9-CM   1. Acute cystitis with hematuria  N30.01 595.0   2. Hypokalemia  E87.6 276.8   3. Hypomagnesemia  E83.42 275.2   4. Acute renal impairment  N28.9 593.9     Patient Active Problem List   Diagnosis   • Fatty liver disease, nonalcoholic   • CKD (chronic kidney disease), stage III (CMS/HCC)   • Bipolar 1 disorder (CMS/HCC)   • Rhinitis, allergic   • Anxiety state   • Depressive disorder   • Hyperlipemia   • Benign essential HTN   • Abnormal glucose   • Bone/cartilage disorder   • Routine general medical examination at a health care facility   • Breast tenderness in female   • Screening for malignant neoplasm of cervix   • Encounter for routine gynecological examination   • Calculus of bile duct   • Gallstone   • Bladder spasm   • Weight loss   • Bursitis of knee   • Gastric reflux   • Constipated   • Cells and casts in urine   • OAB (overactive bladder)   • Left arm pain   • Left foot pain   • Hypokalemia   • Hypomagnesemia   • Cellulitis   • Right arm cellulitis   • Acute encephalopathy   • Acute UTI (urinary tract infection)     Past Medical History:   Diagnosis Date   • Allergic rhinitis    • Anemia    • Bipolar 1 disorder (CMS/HCC)    • Broken bones     cheek bone   • Bursitis of knee    • Calculus of bile duct    • Chronic kidney disease     STAGE 3   • Colon polyps    • Depression    • Dysuria    • Fundic gland polyposis of stomach 2014   • Gallstones 2014    gb sono-stone    • GERD (gastroesophageal reflux disease)    • H/O complete eye exam    • H/O mammogram    • History of dental examination 2016   • Hyperlipidemia    • Hypertension    • Infectious viral hepatitis    • Non-alcoholic fatty liver disease    • Polyp of stomach    • Spinal stenosis    • UTI (urinary tract infection)      Past Surgical History:    Procedure Laterality Date   • BILATERAL BREAST REDUCTION     • CARDIAC CATHETERIZATION     • COLON SURGERY  08/2014    TUBULAR ADENOMA RESECTED   • COLONOSCOPY  2013   • ENDOSCOPY  08/2014    (Dr Huffman)   • ESOPHAGEAL DILATATION     • OTHER SURGICAL HISTORY  08/2014    bx stomach polyps and colon polyps     General Information     Row Name 08/19/21 1720          OT Time and Intention    Document Type  evaluation  -MP     Row Name 08/19/21 1720          General Information    Patient Profile Reviewed  yes  -MP     Prior Level of Function  independent:;ADL's  -MP     Existing Precautions/Restrictions  fall  -MP     Row Name 08/19/21 1720          Living Environment    Lives With  alone  -MP     Row Name 08/19/21 1720          Cognition    Orientation Status (Cognition)  oriented x 3  -MP     Row Name 08/19/21 1720          Safety Issues, Functional Mobility    Safety Issues Affecting Function (Mobility)  insight into deficits/self-awareness  -     Impairments Affecting Function (Mobility)  balance;endurance/activity tolerance;pain;range of motion (ROM);strength  -MP       User Key  (r) = Recorded By, (t) = Taken By, (c) = Cosigned By    Initials Name Provider Type    Noble Palomares OT Occupational Therapist          Mobility/ADL's     Row Name 08/19/21 1721          Bed Mobility    Bed Mobility  supine-sit  -MP     All Activities, Dane (Bed Mobility)  moderate assist (50% patient effort);1 person assist  -MP     Row Name 08/19/21 1721          Activities of Daily Living    BADL Assessment/Intervention  lower body dressing  -     Row Name 08/19/21 1721          Lower Body Dressing Assessment/Training    Dane Level (Lower Body Dressing)  don;doff;socks;dependent (less than 25% patient effort)  -MP       User Key  (r) = Recorded By, (t) = Taken By, (c) = Cosigned By    Initials Name Provider Type    Noble Palomares OT Occupational Therapist        Obj/Interventions     Row Name  08/19/21 1721          Range of Motion Comprehensive    Comment, General Range of Motion  L shoulder flexion impacted 50-75%  -MP     Row Name 08/19/21 1721          Strength Comprehensive (MMT)    Comment, General Manual Muscle Testing (MMT) Assessment  LUE painful and unable to test, RUE 4-/5  -MP     [facility] Name 08/19/21 1721          Balance    Dynamic Sitting Balance  mild impairment  -MP       User Key  (r) = Recorded By, (t) = Taken By, (c) = Cosigned By    Initials Name Provider Type    Noble Palomares OT Occupational Therapist        Goals/Plan     Row Name 08/19/21 1726          Bed Mobility Goal 1 (OT)    Activity/Assistive Device (Bed Mobility Goal 1, OT)  bed mobility activities, all  -MP     [place] Level/Cues Needed (Bed Mobility Goal 1, OT)  minimum assist (75% or more patient effort)  -MP     Time Frame (Bed Mobility Goal 1, OT)  long term goal (LTG);2 weeks  -MP     Row Name 08/19/21 1726          Transfer Goal 1 (OT)    Activity/Assistive Device (Transfer Goal 1, OT)  sit-to-stand/stand-to-sit;toilet  -MP     [place] Level/Cues Needed (Transfer Goal 1, OT)  moderate assist (50-74% patient effort)  -MP     Time Frame (Transfer Goal 1, OT)  long term goal (LTG);2 weeks  -MP     [facility] Name 08/19/21 1726          Toileting Goal 1 (OT)    Activity/Device (Toileting Goal 1, OT)  toileting skills, all  -MP     [place] Level/Cues Needed (Toileting Goal 1, OT)  moderate assist (50-74% patient effort)  -MP     Time Frame (Toileting Goal 1, OT)  2 weeks;long term goal (LTG)  -MP       User Key  (r) = Recorded By, (t) = Taken By, (c) = Cosigned By    Initials Name Provider Type    Noble Palomares OT Occupational Therapist        Clinical Impression     Row Name 08/19/21 1722          Pain Assessment    Additional Documentation  Pain Scale: Numbers Pre/Post-Treatment (Group);Pain Scale: FACES Pre/Post-Treatment (Group)  -MP     [facility] Name 08/19/21 1722          Pain Scale: FACES  Pre/Post-Treatment    Pain: FACES Scale, Pretreatment  6-->hurts even more  -MP     Posttreatment Pain Rating  6-->hurts even more  -MP     Pain Location - Side  Left  -MP     Pain Location - Orientation  upper  -MP     Pain Location  extremity  -MP     Row Name 08/19/21 1722          Plan of Care Review    Plan of Care Reviewed With  patient  -MP     Progress  no change  -MP     Outcome Summary  Pt admitted from assisted living facility secondary to AMS, weakness, UTI and L UE/LE pain. X-ray L humerus (-) fx, X-ray L foot (-) acute fx. LUE Doppler (-). CT C-spine (-) acute findings.  Pt. c/o of continued LUE pain and unable to achieve active flexion >60*, PROM up to 120* pain free. Pt. c/o of tingeling numbness LUE and has h/o of fall x 2 months ago stating she hurt her neck. Pt. w/ potential shoulder pathology, unable to determine and would require orthopedic consult. Pt. completes bed mobility w/ mod A and complete assist for all LB ADLs. Pt. unable to return to assisted living at this time and will require IP rehab at d/c to address aforementioned deficits. Will follow up w/ pt. 1-3x per week at [facility].  -MP     Row Name 08/19/21 1722          Therapy Assessment/Plan (OT)    Rehab Potential (OT)  good, to achieve stated therapy goals  -MP     Criteria for Skilled Therapeutic Interventions Met (OT)  yes;skilled treatment is necessary  -MP     Therapy Frequency (OT)  3 times/wk  -MP     Row Name 08/19/21 1722          Therapy Plan Review/Discharge Plan (OT)    Anticipated Discharge Disposition (OT)  inpatient rehabilitation facility  -MP     Row Name 08/19/21 1722          Vital Signs    Pre Patient Position  Supine  -MP     Intra Patient Position  Sitting  -MP     Post Patient Position  Supine  -MP     Row Name 08/19/21 1722          Positioning and Restraints    Pre-Treatment Position  in bed  -MP     Post Treatment Position  bed  -MP     In Bed  supine;call light within reach;encouraged to call for assist;exit  alarm on  -MP       User Key  (r) = Recorded By, (t) = Taken By, (c) = Cosigned By    Initials Name Provider Type    Noble Palomares OT Occupational Therapist        Outcome Measures     Row Name 08/19/21 1359          How much help from another person do you currently need...    Turning from your back to your side while in flat bed without using bedrails?  3  -AM     Moving from lying on back to sitting on the side of a flat bed without bedrails?  2  -AM     Moving to and from a bed to a chair (including a wheelchair)?  2  -AM     Standing up from a chair using your arms (e.g., wheelchair, bedside chair)?  2  -AM     Climbing 3-5 steps with a railing?  1  -AM     To walk in hospital room?  1  -AM     AM-PAC 6 Clicks Score (PT)  11  -AM     Row Name 08/19/21 1353          Functional Assessment    Outcome Measure Options  AM-PAC 6 Clicks Basic Mobility (PT)  -AM       User Key  (r) = Recorded By, (t) = Taken By, (c) = Cosigned By    Initials Name Provider Type    AM Adrián Veloz PT Physical Therapist          Occupational Therapy Education                 Title: PT OT SLP Therapies (In Progress)     Topic: Occupational Therapy (In Progress)     Point: ADL training (Not Started)     Description:   Instruct learner(s) on proper safety adaptation and remediation techniques during self care or transfers.   Instruct in proper use of assistive devices.              Learner Progress:  Not documented in this visit.          Point: Home exercise program (Not Started)     Description:   Instruct learner(s) on appropriate technique for monitoring, assisting and/or progressing therapeutic exercises/activities.              Learner Progress:  Not documented in this visit.          Point: Precautions (Not Started)     Description:   Instruct learner(s) on prescribed precautions during self-care and functional transfers.              Learner Progress:  Not documented in this visit.          Point: Body mechanics (Done)      Description:   Instruct learner(s) on proper positioning and spine alignment during self-care, functional mobility activities and/or exercises.              Learning Progress Summary           Patient Acceptance, E,TB, VU by  at 8/19/2021 1727                               User Key     Initials Effective Dates Name Provider Type Discipline     06/16/21 -  Noble Sapp OT Occupational Therapist OT              OT Recommendation and Plan  Therapy Frequency (OT): 3 times/wk  Plan of Care Review  Plan of Care Reviewed With: patient  Progress: no change  Outcome Summary: Pt admitted from assisted living facility secondary to AMS, weakness, UTI and L UE/LE pain. X-ray L humerus (-) fx, X-ray L foot (-) acute fx. LUE Doppler (-). CT C-spine (-) acute findings.  Pt. c/o of continued LUE pain and unable to achieve active flexion >60*, PROM up to 120* pain free. Pt. c/o of tingeling numbness LUE and has h/o of fall x 2 months ago stating she hurt her neck. Pt. w/ potential shoulder pathology, unable to determine and would require orthopedic consult. Pt. completes bed mobility w/ mod A and complete assist for all LB ADLs. Pt. unable to return to assisted living at this time and will require IP rehab at d/c to address aforementioned deficits. Will follow up w/ pt. 1-3x per week at [facility].     Time Calculation:   Time Calculation- OT     Row Name 08/19/21 1727             Time Calculation- OT    OT Start Time  1533  -      OT Stop Time  1558  -      OT Time Calculation (min)  25 min  -      Total Timed Code Minutes- OT  0 minute(s)  -      OT Received On  08/19/21  -      OT - Next Appointment  08/23/21  -      OT Goal Re-Cert Due Date  09/02/21  -        User Key  (r) = Recorded By, (t) = Taken By, (c) = Cosigned By    Initials Name Provider Type    Noble Palomares OT Occupational Therapist        Therapy Charges for Today     Code Description Service Date Service Provider Modifiers Qty     [id number]  OT EVAL LOW COMPLEXITY 3 8/19/2021 Noble Sapp, SERGE GO 1               Noble Sapp OT  8/19/2021

## 2021-08-19 NOTE — DISCHARGE PLACEMENT REQUEST
"Sarah Zendejas (89 y.o. Female)     Date of Birth Social Security Number Address Home Phone MRN    12/23/1931  [address] [place] [address] [phone] [id number]    [employer] Marital Status          [employer] Single       Admission Date Admission Type Admitting Provider Attending Provider Department, Room/Bed    8/18/21 Emergency Arturo Miller MD Olisa, Charles O, MD [facility] 3C MEDICAL INPATIENT, 376/1    Discharge Date Discharge Disposition Discharge Destination                       Attending Provider: Arturo Miller MD    Allergies: Morphine, Nsaids, Sulfa Antibiotics, Vancomycin    Isolation: None   Infection: None   Code Status: No CPR    Ht: 157.5 cm (62\")   Wt: 65.8 kg (145 lb)    Admission Cmt: None   Principal Problem: Acute encephalopathy [G93.40]                 Active Insurance as of 8/18/2021     Primary Coverage     Payor Plan Insurance Group Employer/Plan Group    [facility] MEDICARE REPLACEMENT [facility] MEDICARE REPLACEMENT [id number]     Payor Plan Address Payor Plan Phone Number Payor Plan Fax Number Effective Dates    [address] [address]   1/1/2019 - None Entered    [facility] [id number]       Subscriber Name Subscriber Birth Date Member ID       SARAH ZENDEJAS 12/23/1931 [id number]                 Emergency Contacts      (Rel.) Home Phone Work Phone Mobile Phone    Malinda Lord (Relative) [phone] -- --          "

## 2021-08-19 NOTE — PROGRESS NOTES
[facility] Medicine Services Daily Progress Note    Patient Name: Sarah Zendejas  : 1931  MRN: [id number]  Primary Care Physician:  Victoria Naik  Date of admission: 2021      Subjective      Chief Complaint: Altered mental status.      Patient Reports no overnight events.    Review of Systems   Constitutional: Negative.   HENT: Negative.    Eyes: Negative.    Cardiovascular: Negative.    Respiratory: Negative.    Endocrine: Negative.    Hematologic/Lymphatic: Negative.    Skin: Negative.    Musculoskeletal: Negative.    Gastrointestinal: Negative.    Genitourinary: Negative.    Neurological: Negative.    Psychiatric/Behavioral: Negative.    Allergic/Immunologic: Negative.             Objective      Vitals:   Temp:  [97.5 °F (36.4 °C)-99.1 °F (37.3 °C)] 97.5 °F (36.4 °C)  Heart Rate:  [73-91] 91  Resp:  [16-20] 16  BP: (135-147)/(69-85) 141/85    Physical Exam  Vitals and nursing note reviewed.   Constitutional:       General: She is not in acute distress.  HENT:      Head: Normocephalic and atraumatic.      Nose: Nose normal. No congestion or rhinorrhea.      Mouth/Throat:      Mouth: Mucous membranes are moist.      Pharynx: Oropharynx is clear. No oropharyngeal exudate or posterior oropharyngeal erythema.   Eyes:      Pupils: Pupils are equal, round, and reactive to light.   Cardiovascular:      Pulses: Normal pulses.      Heart sounds: Normal heart sounds. No murmur heard.   No friction rub. No gallop.       Comments: S1 and S2 present.  No tachycardia.  Pulmonary:      Effort: No respiratory distress.      Breath sounds: No wheezing, rhonchi or rales.   Chest:      Chest wall: No tenderness.   Abdominal:      General: Abdomen is flat. Bowel sounds are normal. There is no distension.      Palpations: Abdomen is soft.      Tenderness: There is no right CVA tenderness.   Musculoskeletal:         General: No swelling, tenderness, deformity or signs of injury.      Cervical back:  Neck supple. No tenderness.      Right lower leg: No edema.      Left lower leg: No edema.   Skin:     Capillary Refill: Capillary refill takes less than 2 seconds.      Coloration: Skin is not jaundiced.      Findings: No bruising, lesion or rash.   Neurological:      Mental Status: She is alert.      Comments: No facial asymmetry noted.  Gait and station not tested.   Psychiatric:      Comments: No agitation.               Result Review    Result Review:  I have personally reviewed the results from the time of this admission to 8/19/2021 16:23 EDT and agree with these findings:  [x]  Laboratory  [x]  Microbiology  [x]  Radiology  []  EKG/Telemetry   []  Cardiology/Vascular   []  Pathology  []  Old records  []  Other:  Most notable findings include: Potassium 2.9.  Creatinine 0.79.  GFR 69.  WBC 12.0.  Hemoglobin 11.4.  Hematocrit 34.3.  Platelets 248K.          Assessment/Plan      Brief Patient Summary:  Patient is an 89-year-old female with past medical history bipolar 1 disorder, anemia, depression, CKD stage III,, GERD, hypertension, hyperlipidemia, spinal stenosis who presented to the emergency room from an assisted living facility because of altered mental status.  Patient was seen in in the emergency room and was diagnosed with cellulitis.  Patient was also diagnosed with a urinary tract infection and acute encephalopathy.  Neurology consult was completed.      ARIPiprazole, 2 mg, Oral, Nightly  atorvastatin, 80 mg, Oral, Nightly  cefTRIAXone, 1 g, Intravenous, Q24H  divalproex, 125 mg, Oral, BID  ezetimibe, 10 mg, Oral, Daily  FLUoxetine, 40 mg, Oral, Nightly  heparin (porcine), 5,000 Units, Subcutaneous, Q8H  HYDROcodone-acetaminophen, 1 tablet, Oral, TID  metOLazone, 5 mg, Oral, Daily  oxybutynin XL, 10 mg, Oral, Daily  pantoprazole, 40 mg, Oral, QAM  sodium chloride, 10 mL, Intravenous, Q12H  topiramate, 150 mg, Oral, Nightly             Active Hospital Problems:  Active Hospital Problems    Diagnosis     • **Acute encephalopathy  Treat with supportive care.  Follow neurology recommendations.      • Right arm cellulitis  Treat with antibiotics.      • Acute UTI (urinary tract infection)  Treat with antibiotics.  Follow cultures.      • Left arm pain  Treat with pain control, [place].      • Left foot pain  Treat with pain control, Norco.      • Hypokalemia  Replete.      • Hypomagnesemia  Replete.      • Cellulitis  Treat with antibiotics.      • OAB (overactive bladder)  Treat with oxybutynin.      • Anxiety state  Treat with Prozac.      • Benign essential HTN  Continue to monitor.    • Bipolar 1 disorder (CMS/[facility])    • Hyperlipemia  Treat with Zetia and Lipitor.      • Gastric reflux  Treat with Protonix.      • Depressive disorder  Treat with Abilify.      • CKD (chronic kidney disease), stage III (CMS/[facility])  Continue to monitor.           Continue appropriate patient's home medications for other chronic medical conditions.  Continue the present level of care.  Patient and family agreed with the plan of care.      DVT prophylaxis:  Medical DVT prophylaxis orders are present.    CODE STATUS:    Limited Support to NOT Include: Intubation; Cardioversion/Defibrillation  Level Of Support Discussed With: Patient  Code Status: No CPR  Medical Interventions (Level of Support Prior to Arrest): Limited      Disposition: Pending patient's clinical improvement.    This patient has been examined wearing appropriate Personal Protective Equipment and discussed with hospital infection control department, [facility] department, infectious disease specialist and pulmonologist. 08/19/21      Electronically signed by Arturo Miller MD, FACP, 08/19/21, 16:23 EDT.      [facility] Hospitalist Team

## 2021-08-19 NOTE — PLAN OF CARE
Goal Outcome Evaluation:              Outcome Summary: Pt sleeping at this time. Will continue to monitor

## 2021-08-19 NOTE — PLAN OF CARE
Goal Outcome Evaluation:  Plan of Care Reviewed With: patient, daughter           Outcome Summary: Pt admitted from assisted living facility secondary to AMS, weakness, UTI and L UE/LE pain.  X-ray L humerus (-) fx, X-ray L foot (-) acute fx.  LUE Doppler (-).  CT C-spine (-) acute findings.  Pt unable to hold head in midline for more than 30 seconds at a time when sitting EOB.  Head is flexed, rotated to R and tilted towards L side.  Daughter reports neck weakness has occurred for past 6 months.  Pt required mod A for bed mobility, mod A x 2 for transfers and ambulated 10' with min A but required therapist to place L hand on RW due to weakness  At end of session, pt was sitting EOB ready to lie back down and requested to use commode.  Stand-pivot transfer bed to b/s commode: mod A x 2.  Pt requesting increase time on b/s commode.  Left pt sitting on commode with daughter present, call light within reach and notified nsg.  Observed LUE swollen and name band tight across wrist- alerted RN that new band needs to be place.  Recommend inpt rehab

## 2021-08-20 LAB
BACTERIA SPEC AEROBE CULT: ABNORMAL
MAGNESIUM SERPL-MCNC: 2.6 MG/DL (ref 1.6–2.4)
POTASSIUM SERPL-SCNC: 3.2 MMOL/L (ref 3.5–5.2)
POTASSIUM SERPL-SCNC: 4.1 MMOL/L (ref 3.5–5.2)

## 2021-08-20 PROCEDURE — 25010000002 CEFTRIAXONE PER 250 MG: Performed by: NURSE PRACTITIONER

## 2021-08-20 PROCEDURE — 25010000002 HEPARIN (PORCINE) PER 1000 UNITS: Performed by: NURSE PRACTITIONER

## 2021-08-20 PROCEDURE — 83735 ASSAY OF MAGNESIUM: CPT | Performed by: NURSE PRACTITIONER

## 2021-08-20 PROCEDURE — 97530 THERAPEUTIC ACTIVITIES: CPT

## 2021-08-20 PROCEDURE — 97110 THERAPEUTIC EXERCISES: CPT

## 2021-08-20 PROCEDURE — 99226 PR SBSQ OBSERVATION CARE/DAY 35 MINUTES: CPT | Performed by: INTERNAL MEDICINE

## 2021-08-20 PROCEDURE — 96372 THER/PROPH/DIAG INJ SC/IM: CPT

## 2021-08-20 PROCEDURE — 84132 ASSAY OF SERUM POTASSIUM: CPT | Performed by: NURSE PRACTITIONER

## 2021-08-20 PROCEDURE — 84132 ASSAY OF SERUM POTASSIUM: CPT | Performed by: INTERNAL MEDICINE

## 2021-08-20 RX ADMIN — FLUOXETINE 40 MG: 20 CAPSULE ORAL at 20:26

## 2021-08-20 RX ADMIN — Medication 10 ML: at 09:01

## 2021-08-20 RX ADMIN — METOLAZONE 5 MG: 5 TABLET ORAL at 09:01

## 2021-08-20 RX ADMIN — DIVALPROEX SODIUM 125 MG: 125 TABLET, DELAYED RELEASE ORAL at 09:01

## 2021-08-20 RX ADMIN — OXYBUTYNIN CHLORIDE 10 MG: 10 TABLET, EXTENDED RELEASE ORAL at 09:01

## 2021-08-20 RX ADMIN — HEPARIN SODIUM 5000 UNITS: 5000 INJECTION INTRAVENOUS; SUBCUTANEOUS at 06:11

## 2021-08-20 RX ADMIN — HEPARIN SODIUM 5000 UNITS: 5000 INJECTION INTRAVENOUS; SUBCUTANEOUS at 13:32

## 2021-08-20 RX ADMIN — POTASSIUM CHLORIDE 40 MEQ: 1500 TABLET, EXTENDED RELEASE ORAL at 06:11

## 2021-08-20 RX ADMIN — TOPIRAMATE 150 MG: 100 TABLET, FILM COATED ORAL at 20:26

## 2021-08-20 RX ADMIN — HYDROCODONE BITARTRATE AND ACETAMINOPHEN 1 TABLET: 5; 325 TABLET ORAL at 16:06

## 2021-08-20 RX ADMIN — Medication 10 ML: at 20:25

## 2021-08-20 RX ADMIN — PANTOPRAZOLE SODIUM 40 MG: 40 TABLET, DELAYED RELEASE ORAL at 06:11

## 2021-08-20 RX ADMIN — HEPARIN SODIUM 5000 UNITS: 5000 INJECTION INTRAVENOUS; SUBCUTANEOUS at 20:26

## 2021-08-20 RX ADMIN — ARIPIPRAZOLE 2 MG: 2 TABLET ORAL at 20:26

## 2021-08-20 RX ADMIN — DIVALPROEX SODIUM 125 MG: 125 TABLET, DELAYED RELEASE ORAL at 20:26

## 2021-08-20 RX ADMIN — HYDROCODONE BITARTRATE AND ACETAMINOPHEN 1 TABLET: 5; 325 TABLET ORAL at 09:01

## 2021-08-20 RX ADMIN — HYDROCODONE BITARTRATE AND ACETAMINOPHEN 1 TABLET: 5; 325 TABLET ORAL at 20:26

## 2021-08-20 RX ADMIN — POTASSIUM CHLORIDE 40 MEQ: 1500 TABLET, EXTENDED RELEASE ORAL at 10:17

## 2021-08-20 RX ADMIN — CEFTRIAXONE SODIUM 1 G: 1 INJECTION, POWDER, FOR SOLUTION INTRAMUSCULAR; INTRAVENOUS at 13:32

## 2021-08-20 RX ADMIN — ATORVASTATIN CALCIUM 80 MG: 40 TABLET, FILM COATED ORAL at 20:26

## 2021-08-20 NOTE — THERAPY TREATMENT NOTE
Subjective: Pt agreeable to therapeutic plan of care.    Objective:     Bed mobility - Mod-A  Transfers - Mod-A and with rolling walker - cues for safety.   Ambulation - not assessed - pt reports feeling too weak to attempt today.    SEATED THEREX: BLE AROM x10 reps grossly with cues for attention task.    Pain: 5 VAS - L arm /leg  Education: Provided education on importance of mobility and skilled verbal / tactile cueing throughout intervention.     Assessment: Sarah Zendejas presents with functional mobility impairments which indicate the need for skilled intervention. Tolerating session today without incident. Will continue to follow and progress as tolerated. Pt progressed with MOD A with SPS transfers with RWx.    Plan/Recommendations:   Pt would benefit from Inpatient Rehabilitation placement at discharge from facility and requires rolling walker at discharge.   Pt desires Inpatient Rehabilitation placement at discharge. Pt cooperative; agreeable to therapeutic recommendations and plan of care.     Post-Tx Position: Up in Chair, Alarms activated and Call light and personal items within reach; family present.  PPE: gloves, surgical mask, eyewear protection

## 2021-08-20 NOTE — PLAN OF CARE
Goal Outcome Evaluation:  Plan of Care Reviewed With: patient        Progress: no change  Outcome Summary: Pt has slept on and off tonight remains pleasantly confused/forgetful.  pt assisted with turning and preventative meplexs placed on heels. no new complaints at this time. pt will need inpt rehab at d/c

## 2021-08-20 NOTE — PROGRESS NOTES
Patient seen by Dr. Wu  Doing much better  Awake and alert and oriented x2  Limited nonfocal examination  I talked to her and her family in detail  MRI results reviewed with her and they were essentially unremarkable    Plan is to continue present UTI treatment and placement    Call as needed and follow-up with neurology if needed

## 2021-08-20 NOTE — PLAN OF CARE
Sarah Zendejas presents with functional mobility impairments which indicate the need for skilled intervention. Tolerating session today without incident. Will continue to follow and progress as tolerated. Pt progressed with MOD A with SPS transfers with RWx.

## 2021-08-20 NOTE — CASE MANAGEMENT/SOCIAL WORK
Continued Stay Note  CARMEN Dickerson     Patient Name: Sarah Zendejas  MRN: [id number]  Today's Date: 8/20/2021    Admit Date: 8/18/2021    Discharge Plan     Row Name 08/20/21 0950       Plan    Plan  PT/OT recommending inpt rehab. From [facility], current with [facility] , ESTEPHANIA order placed. [facility] is queued for review.        Phone communication or documentation only - no physical contact with patient or family.    [facility], LSW    Office: [phone]  Cell: [phone]  Fax: [phone]  E-mail: [email or web address]

## 2021-08-20 NOTE — PROGRESS NOTES
[facility] Medicine Services Daily Progress Note    Patient Name: Sarah Zendejas  : 1931  MRN: [id number]  Primary Care Physician:  Victoria Naik  Date of admission: 2021      Subjective      Chief Complaint: Altered mental status.      Patient Reports moderate improvement in her symptoms.    Review of Systems   Constitutional: Negative.   HENT: Negative.    Eyes: Negative.    Cardiovascular: Negative.    Respiratory: Negative.    Endocrine: Negative.    Hematologic/Lymphatic: Negative.    Skin: Negative.    Musculoskeletal: Negative.    Gastrointestinal: Negative.    Genitourinary: Negative.    Neurological: Negative.    Psychiatric/Behavioral: Negative.    Allergic/Immunologic: Negative.             Objective      Vitals:   Temp:  [97.7 °F (36.5 °C)-97.8 °F (36.6 °C)] 97.8 °F (36.6 °C)  Heart Rate:  [75-80] 75  Resp:  [16] 16  BP: (126-142)/(68-76) 126/68    Physical Exam  Vitals and nursing note reviewed.   Constitutional:       General: She is not in acute distress.  HENT:      Head: Normocephalic and atraumatic.      Nose: Nose normal. No congestion or rhinorrhea.      Mouth/Throat:      Mouth: Mucous membranes are moist.      Pharynx: Oropharynx is clear. No oropharyngeal exudate or posterior oropharyngeal erythema.   Eyes:      Pupils: Pupils are equal, round, and reactive to light.   Cardiovascular:      Pulses: Normal pulses.      Heart sounds: Normal heart sounds. No murmur heard.   No friction rub. No gallop.       Comments: S1 and S2 present.  No tachycardia.  Pulmonary:      Effort: No respiratory distress.      Breath sounds: No wheezing, rhonchi or rales.   Chest:      Chest wall: No tenderness.   Abdominal:      General: Abdomen is flat. Bowel sounds are normal. There is no distension.      Palpations: Abdomen is soft.      Tenderness: There is no right CVA tenderness.   Musculoskeletal:         General: No swelling, tenderness, deformity or signs of injury.       Cervical back: Neck supple. No tenderness.      Right lower leg: No edema.      Left lower leg: No edema.   Skin:     Capillary Refill: Capillary refill takes less than 2 seconds.      Coloration: Skin is not jaundiced.      Findings: No bruising, lesion or rash.   Neurological:      Mental Status: She is alert.      Comments: No facial asymmetry noted.  Gait and station not tested.   Psychiatric:      Comments: No agitation.               Result Review    Result Review:  I have personally reviewed the results from the time of this admission to 8/20/2021 18:09 EDT and agree with these findings:  [x]  Laboratory  [x]  Microbiology  [x]  Radiology  []  EKG/Telemetry   []  Cardiology/Vascular   []  Pathology  []  Old records  []  Other:  Most notable findings include: WBC 12.0.  Hemoglobin 11.4.  Hematocrit 34.3.  Platelets 248,000.          Assessment/Plan      Brief Patient Summary:  Patient is an 89-year-old female with past medical history GERD, bipolar 1 disorder, anemia, depression, CKD stage III,  hypertension, hyperlipidemia, spinal stenosis who presented to the emergency room from an assisted living facility because of altered mental status.  Patient was seen in in the emergency room and was diagnosed with cellulitis.  Patient was also diagnosed with a urinary tract infection and acute encephalopathy.  Neurology consult was completed.  Patient is pending approval to go to rehab.      ARIPiprazole, 2 mg, Oral, Nightly  atorvastatin, 80 mg, Oral, Nightly  cefTRIAXone, 1 g, Intravenous, Q24H  divalproex, 125 mg, Oral, BID  ezetimibe, 10 mg, Oral, Daily  FLUoxetine, 40 mg, Oral, Nightly  heparin (porcine), 5,000 Units, Subcutaneous, Q8H  HYDROcodone-acetaminophen, 1 tablet, Oral, TID  metOLazone, 5 mg, Oral, Daily  oxybutynin XL, 10 mg, Oral, Daily  pantoprazole, 40 mg, Oral, QAM  sodium chloride, 10 mL, Intravenous, Q12H  topiramate, 150 mg, Oral, Nightly             Active Hospital Problems:  Active Hospital  Problems    Diagnosis    • **Acute encephalopathy  Treat with supportive care.  Follow neurology recommendations.      • Right arm cellulitis  Treat with antibiotics.      • Acute UTI (urinary tract infection)  Treat with antibiotics.  Follow cultures.      • Left arm pain  Treat with pain control, [place].      • Left foot pain  Treat with pain control, Norco.      • Hypokalemia  Replete.      • Hypomagnesemia  Replete.      • Cellulitis  Treat with antibiotics.      • OAB (overactive bladder)  Treat with oxybutynin.      • Anxiety state  Treat with Prozac.      • Benign essential HTN  Continue to monitor.    • Bipolar 1 disorder (CMS/HCC)    • Hyperlipemia  Treat with Zetia and Lipitor.      • Gastric reflux  Treat with Protonix.      • Depressive disorder  Treat with Abilify.      • CKD (chronic kidney disease), stage III (CMS/[facility])  Continue to monitor.      Leukocytosis.  Complete 2 sets of blood cultures.  Continue to monitor.           Continue appropriate patient's home medications for other chronic medical conditions.  Continue the present level of care.  Patient and family agreed with the plan of care.    This wonderful patient will require 30 days or less of rehab.      DVT prophylaxis:  Medical DVT prophylaxis orders are present.    CODE STATUS:    Limited Support to NOT Include: Intubation; Cardioversion/Defibrillation  Level Of Support Discussed With: Patient  Code Status: No CPR  Medical Interventions (Level of Support Prior to Arrest): Limited      Disposition: Pending patient's clinical improvement.    This patient has been examined wearing appropriate Personal Protective Equipment and discussed with hospital infection control department, [facility] department, infectious disease specialist and pulmonologist. 08/20/21      Electronically signed by Arturo Miller MD, FACP, 08/20/21, 18:09 EDT.      [facility] Hospitalist Team

## 2021-08-20 NOTE — CASE MANAGEMENT/SOCIAL WORK
Continued Stay Note  [facility]     Patient Name: Sarah Zendejas  MRN: [id number]  Today's Date: 8/20/2021    Admit Date: 8/18/2021    Discharge Plan     Row Name 08/20/21 1458       Plan    Plan  [facility], accepted, pending precert. PASRR queued for review. Precert started 8/20, pending.    Plan Comments  Received notice from RN that patients niece first choice is [facility], referral made and accepted. Precert started today.        Phone communication or documentation only - no physical contact with patient or family.        Dominique Mcgregor RN

## 2021-08-20 NOTE — CASE MANAGEMENT/SOCIAL WORK
Continued Stay Note  [facility]     Patient Name: Sarah Zendejas  MRN: [id number]  Today's Date: 8/20/2021    Admit Date: 8/18/2021    Discharge Plan     Row Name 08/20/21 1652       Plan    Plan  [facility], accepted, pending precert. PASRR approved. Precert started 8/20, pending.     Phone communication or documentation only - no physical contact with patient or family.    [facility], [facility]    Office: [phone]  Cell: [phone]  Fax: [phone]  E-mail: [email or web address]

## 2021-08-20 NOTE — PLAN OF CARE
Goal Outcome Evaluation:                   Patient is doing well, no complaints today. Looking at [facility] on discharge, will need precert.

## 2021-08-21 LAB
MAGNESIUM SERPL-MCNC: 1.9 MG/DL (ref 1.6–2.4)
POTASSIUM SERPL-SCNC: 3.7 MMOL/L (ref 3.5–5.2)

## 2021-08-21 PROCEDURE — 83735 ASSAY OF MAGNESIUM: CPT | Performed by: NURSE PRACTITIONER

## 2021-08-21 PROCEDURE — 84132 ASSAY OF SERUM POTASSIUM: CPT | Performed by: NURSE PRACTITIONER

## 2021-08-21 PROCEDURE — 97530 THERAPEUTIC ACTIVITIES: CPT

## 2021-08-21 PROCEDURE — 25010000002 CEFTRIAXONE PER 250 MG: Performed by: NURSE PRACTITIONER

## 2021-08-21 PROCEDURE — 96372 THER/PROPH/DIAG INJ SC/IM: CPT

## 2021-08-21 PROCEDURE — 97112 NEUROMUSCULAR REEDUCATION: CPT

## 2021-08-21 PROCEDURE — 25010000002 HEPARIN (PORCINE) PER 1000 UNITS: Performed by: NURSE PRACTITIONER

## 2021-08-21 PROCEDURE — 99226 PR SBSQ OBSERVATION CARE/DAY 35 MINUTES: CPT | Performed by: HOSPITALIST

## 2021-08-21 PROCEDURE — 97535 SELF CARE MNGMENT TRAINING: CPT

## 2021-08-21 RX ADMIN — METOLAZONE 5 MG: 5 TABLET ORAL at 08:41

## 2021-08-21 RX ADMIN — DIVALPROEX SODIUM 125 MG: 125 TABLET, DELAYED RELEASE ORAL at 20:01

## 2021-08-21 RX ADMIN — HEPARIN SODIUM 5000 UNITS: 5000 INJECTION INTRAVENOUS; SUBCUTANEOUS at 17:29

## 2021-08-21 RX ADMIN — OXYBUTYNIN CHLORIDE 10 MG: 10 TABLET, EXTENDED RELEASE ORAL at 08:41

## 2021-08-21 RX ADMIN — TOPIRAMATE 150 MG: 100 TABLET, FILM COATED ORAL at 20:01

## 2021-08-21 RX ADMIN — Medication 10 ML: at 20:01

## 2021-08-21 RX ADMIN — ARIPIPRAZOLE 2 MG: 2 TABLET ORAL at 20:01

## 2021-08-21 RX ADMIN — HYDROCODONE BITARTRATE AND ACETAMINOPHEN 1 TABLET: 5; 325 TABLET ORAL at 08:41

## 2021-08-21 RX ADMIN — Medication 10 ML: at 08:42

## 2021-08-21 RX ADMIN — ACETAMINOPHEN 650 MG: 325 TABLET, FILM COATED ORAL at 23:49

## 2021-08-21 RX ADMIN — FLUOXETINE 40 MG: 20 CAPSULE ORAL at 20:01

## 2021-08-21 RX ADMIN — ATORVASTATIN CALCIUM 80 MG: 40 TABLET, FILM COATED ORAL at 20:01

## 2021-08-21 RX ADMIN — PANTOPRAZOLE SODIUM 40 MG: 40 TABLET, DELAYED RELEASE ORAL at 06:17

## 2021-08-21 RX ADMIN — CEFTRIAXONE SODIUM 1 G: 1 INJECTION, POWDER, FOR SOLUTION INTRAMUSCULAR; INTRAVENOUS at 11:32

## 2021-08-21 RX ADMIN — HYDROCODONE BITARTRATE AND ACETAMINOPHEN 1 TABLET: 5; 325 TABLET ORAL at 17:29

## 2021-08-21 RX ADMIN — HEPARIN SODIUM 5000 UNITS: 5000 INJECTION INTRAVENOUS; SUBCUTANEOUS at 06:17

## 2021-08-21 RX ADMIN — TRAZODONE HYDROCHLORIDE 25 MG: 50 TABLET ORAL at 20:01

## 2021-08-21 RX ADMIN — DIVALPROEX SODIUM 125 MG: 125 TABLET, DELAYED RELEASE ORAL at 08:42

## 2021-08-21 RX ADMIN — HYDROCODONE BITARTRATE AND ACETAMINOPHEN 1 TABLET: 5; 325 TABLET ORAL at 20:01

## 2021-08-21 NOTE — THERAPY TREATMENT NOTE
Subjective: Pt agreeable to therapeutic plan of care. Patient stated she was pretty worn out but agreed to sit EOB to finish eating supper    Objective:     Bed mobility - Mod-A  Transfers - Mod-A and with rolling walker  Ambulation -  feet N/A or Not attempted.    Pain: c/o pain LUE at wrist/hand.   Education: Provided education on importance of mobility and skilled verbal / tactile cueing throughout intervention.     Assessment: Sarah Zendejas presents with functional mobility impairments which indicate the need for skilled intervention. Tolerating session today without incident. Patient tolerated EOB x 30 mins ot finish eating supper. Req vc's to bring trunk forward and sit upright. Pt fearful of falling . Able to side step towards HOB with min assist and vc's for proper wt shifitng and posture. Will benefit from rehab to see if she can return to [facility]. Will continue to follow and progress as tolerated.     Plan/Recommendations:   Pt would benefit from Inpatient Rehabilitation placement at discharge from facility and requires no DME at discharge.   Pt desires Inpatient Rehabilitation placement at discharge. Pt cooperative; agreeable to therapeutic recommendations and plan of care.     Basic Mobility 6-click:  Rollin = Total, A lot = 2, A little = 3; 4 = None  Supine>Sit:   1 = Total, A lot = 2, A little = 3; 4 = None   Sit>Stand with arms:  1 = Total, A lot = 2, A little = 3; 4 = None  Bed>Chair:   1 = Total, A lot = 2, A little = 3; 4 = None  Ambulate in room:  1 = Total, A lot = 2, A little = 3; 4 = None  3-5 Steps with railin = Total, A lot = 2, A little = 3; 4 = None  Score: 11    Modified [place]: 4 = Moderately severe disability (Unable to attend to own bodily needs without assistance, and unable to walk unassisted)     Post-Tx Position: Supine with HOB Elevated, Alarms activated and Call light and personal items within reach  PPE: gloves, surgical mask, eyewear protection

## 2021-08-21 NOTE — PROGRESS NOTES
[facility] Medicine Services Daily Progress Note    Patient Name: Sarah Zendejas  : 1931  MRN: [id number]  Primary Care Physician:  Victoria Naik  Date of admission: 2021      Subjective      Chief Complaint: Altered mental status.    Subjective   Patient alert and oriented times 3, complaining of generalized weakness. No other active complaint.    Review of Systems   Constitutional: Negative.   HENT: Negative.    Eyes: Negative.    Cardiovascular: Negative.    Respiratory: Negative.    Endocrine: Negative.    Hematologic/Lymphatic: Negative.    Skin: Negative.    Musculoskeletal: Negative.    Gastrointestinal: Negative.    Genitourinary: Negative.    Neurological: Negative.    Psychiatric/Behavioral: Negative.    Allergic/Immunologic: Negative.             Objective      Vitals:   Temp:  [97.6 °F (36.4 °C)-98.6 °F (37 °C)] 97.6 °F (36.4 °C)  Heart Rate:  [77-92] 77  Resp:  [16] 16  BP: (117-153)/(68-72) 153/72    Physical Exam  Vitals and nursing note reviewed.   Constitutional:       General: She is not in acute distress.  HENT:      Head: Normocephalic and atraumatic.      Nose: Nose normal. No congestion or rhinorrhea.      Mouth/Throat:      Mouth: Mucous membranes are moist.      Pharynx: Oropharynx is clear. No oropharyngeal exudate or posterior oropharyngeal erythema.   Eyes:      Pupils: Pupils are equal, round, and reactive to light.   Cardiovascular:      Pulses: Normal pulses.      Heart sounds: Normal heart sounds. No murmur heard.   No friction rub. No gallop.       Comments: S1 and S2 present.  No tachycardia.  Pulmonary:      Effort: No respiratory distress.      Breath sounds: No wheezing, rhonchi or rales.   Chest:      Chest wall: No tenderness.   Abdominal:      General: Abdomen is flat. Bowel sounds are normal. There is no distension.      Palpations: Abdomen is soft.      Tenderness: There is no right CVA tenderness.   Musculoskeletal:         General: No  swelling, tenderness, deformity or signs of injury.      Cervical back: Neck supple. No tenderness.      Right lower leg: No edema.      Left lower leg: No edema.   Skin:     Capillary Refill: Capillary refill takes less than 2 seconds.      Coloration: Skin is not jaundiced.      Findings: No bruising, lesion or rash.   Neurological:      Mental Status: She is alert.      Comments: No facial asymmetry noted.  Gait and station not tested.   Psychiatric:      Comments: No agitation.               Result Review    Result Review:  I have personally reviewed the results from the time of this admission to 8/21/2021 12:35 EDT and agree with these findings:  [x]  Laboratory  [x]  Microbiology  [x]  Radiology  []  EKG/Telemetry   []  Cardiology/Vascular   []  Pathology  []  Old records  []  Other:  Most notable findings include: WBC 12.0.  Hemoglobin 11.4.  Hematocrit 34.3.  Platelets 248,000.          Assessment/Plan      Brief Patient Summary:  Patient is an 89-year-old female with past medical history GERD, bipolar 1 disorder, anemia, depression, CKD stage III,  hypertension, hyperlipidemia, spinal stenosis who presented to the emergency room from an assisted living facility because of altered mental status.  Patient was seen in in the emergency room and was diagnosed with cellulitis.  Patient was also diagnosed with a urinary tract infection and acute encephalopathy.  Neurology consult was completed.  Patient is pending approval to go to rehab.      ARIPiprazole, 2 mg, Oral, Nightly  atorvastatin, 80 mg, Oral, Nightly  cefTRIAXone, 1 g, Intravenous, Q24H  divalproex, 125 mg, Oral, BID  ezetimibe, 10 mg, Oral, Daily  FLUoxetine, 40 mg, Oral, Nightly  heparin (porcine), 5,000 Units, Subcutaneous, Q8H  HYDROcodone-acetaminophen, 1 tablet, Oral, TID  metOLazone, 5 mg, Oral, Daily  oxybutynin XL, 10 mg, Oral, Daily  pantoprazole, 40 mg, Oral, QAM  sodium chloride, 10 mL, Intravenous, Q12H  topiramate, 150 mg, Oral,  Nightly             Active Hospital Problems:  Active Hospital Problems    Diagnosis    • **Acute encephalopathy  Treat with supportive care.  Follow neurology recommendations.      • Right arm cellulitis  Treat with antibiotics.      • Acute UTI (urinary tract infection)  Treat with antibiotics.  Follow cultures.      • Left arm pain  Treat with pain control, [place].      • Left foot pain  Treat with pain control, Norco.      • Hypokalemia  Replete.      • Hypomagnesemia  Replete.      • Cellulitis  Treat with antibiotics.      • OAB (overactive bladder)  Treat with oxybutynin.      • Anxiety state  Treat with Prozac.      • Benign essential HTN  Continue to monitor.    • Bipolar 1 disorder (CMS/HCC)    • Hyperlipemia  Treat with Zetia and Lipitor.      • Gastric reflux  Treat with Protonix.      • Depressive disorder  Treat with Abilify.      • CKD (chronic kidney disease), stage III (CMS/HCC)  Continue to monitor.      Leukocytosis.  Complete 2 sets of blood cultures.  Continue to monitor.           Awaited Pre-cert once arranged ..... will discharge the patent.     patient will require 30 days or less of rehab.      DVT prophylaxis:  Medical DVT prophylaxis orders are present.    CODE STATUS:    Limited Support to NOT Include: Intubation; Cardioversion/Defibrillation  Level Of Support Discussed With: Patient  Code Status: No CPR  Medical Interventions (Level of Support Prior to Arrest): Limited      Disposition: Pending patient's clinical improvement.    This patient has been examined wearing appropriate Personal Protective Equipment and discussed with hospital infection control department, [facility] department, infectious disease specialist and pulmonologist. 08/21/21      Electronically signed by Keiko Torres MD, FACP, 08/21/21, 12:35 EDT.      [facility] Hospitalist Team

## 2021-08-21 NOTE — PLAN OF CARE
Goal Outcome Evaluation:  Plan of Care Reviewed With: patient        Progress: no change  Outcome Summary: Patient waiting on placement to [facility]. no complaints. resting in bed throughout afternoon.

## 2021-08-21 NOTE — PLAN OF CARE
Assessment: Sarah Zendejas presents with functional mobility impairments which indicate the need for skilled intervention. Tolerating session today without incident. Patient tolerated EOB x 30 mins ot finish eating supper. Req vc's to bring trunk forward and sit upright. Pt fearful of falling . Able to side step towards HOB with min assist and vc's for proper wt shifitng and posture. Will benefit from rehab to see if she can return to [facility]. Will continue to follow and progress as tolerated.

## 2021-08-21 NOTE — PLAN OF CARE
Goal Outcome Evaluation:              Outcome Summary: Patient pleasantly confused waiting on placement to [facility].  Will continue to monitor.

## 2021-08-22 LAB
ANION GAP SERPL CALCULATED.3IONS-SCNC: 12 MMOL/L (ref 5–15)
BASOPHILS # BLD AUTO: 0.1 10*3/MM3 (ref 0–0.2)
BASOPHILS NFR BLD AUTO: 0.7 % (ref 0–1.5)
BUN SERPL-MCNC: 24 MG/DL (ref 8–23)
BUN/CREAT SERPL: 26.7 (ref 7–25)
CALCIUM SPEC-SCNC: 8.2 MG/DL (ref 8.6–10.5)
CHLORIDE SERPL-SCNC: 100 MMOL/L (ref 98–107)
CO2 SERPL-SCNC: 23 MMOL/L (ref 22–29)
CREAT SERPL-MCNC: 0.9 MG/DL (ref 0.57–1)
DEPRECATED RDW RBC AUTO: 51.6 FL (ref 37–54)
EOSINOPHIL # BLD AUTO: 0.4 10*3/MM3 (ref 0–0.4)
EOSINOPHIL NFR BLD AUTO: 5.8 % (ref 0.3–6.2)
ERYTHROCYTE [DISTWIDTH] IN BLOOD BY AUTOMATED COUNT: 15.9 % (ref 12.3–15.4)
GFR SERPL CREATININE-BSD FRML MDRD: 59 ML/MIN/1.73
GLUCOSE SERPL-MCNC: 95 MG/DL (ref 65–99)
HCT VFR BLD AUTO: 35.2 % (ref 34–46.6)
HGB BLD-MCNC: 11.9 G/DL (ref 12–15.9)
LYMPHOCYTES # BLD AUTO: 1.9 10*3/MM3 (ref 0.7–3.1)
LYMPHOCYTES NFR BLD AUTO: 24.9 % (ref 19.6–45.3)
MAGNESIUM SERPL-MCNC: 1.7 MG/DL (ref 1.6–2.4)
MCH RBC QN AUTO: 31.1 PG (ref 26.6–33)
MCHC RBC AUTO-ENTMCNC: 33.8 G/DL (ref 31.5–35.7)
MCV RBC AUTO: 92 FL (ref 79–97)
MONOCYTES # BLD AUTO: 1 10*3/MM3 (ref 0.1–0.9)
MONOCYTES NFR BLD AUTO: 12.8 % (ref 5–12)
NEUTROPHILS NFR BLD AUTO: 4.2 10*3/MM3 (ref 1.7–7)
NEUTROPHILS NFR BLD AUTO: 55.8 % (ref 42.7–76)
NRBC BLD AUTO-RTO: 0.1 /100 WBC (ref 0–0.2)
PLATELET # BLD AUTO: 361 10*3/MM3 (ref 140–450)
PMV BLD AUTO: 8.2 FL (ref 6–12)
POTASSIUM SERPL-SCNC: 3.3 MMOL/L (ref 3.5–5.2)
RBC # BLD AUTO: 3.83 10*6/MM3 (ref 3.77–5.28)
SODIUM SERPL-SCNC: 135 MMOL/L (ref 136–145)
WBC # BLD AUTO: 7.5 10*3/MM3 (ref 3.4–10.8)

## 2021-08-22 PROCEDURE — 80048 BASIC METABOLIC PNL TOTAL CA: CPT | Performed by: HOSPITALIST

## 2021-08-22 PROCEDURE — 85025 COMPLETE CBC W/AUTO DIFF WBC: CPT | Performed by: HOSPITALIST

## 2021-08-22 PROCEDURE — 99225 PR SBSQ OBSERVATION CARE/DAY 25 MINUTES: CPT | Performed by: HOSPITALIST

## 2021-08-22 PROCEDURE — 25010000002 HEPARIN (PORCINE) PER 1000 UNITS: Performed by: NURSE PRACTITIONER

## 2021-08-22 PROCEDURE — 25010000002 CEFTRIAXONE PER 250 MG: Performed by: NURSE PRACTITIONER

## 2021-08-22 PROCEDURE — 96372 THER/PROPH/DIAG INJ SC/IM: CPT

## 2021-08-22 PROCEDURE — 96367 TX/PROPH/DG ADDL SEQ IV INF: CPT

## 2021-08-22 PROCEDURE — 83735 ASSAY OF MAGNESIUM: CPT | Performed by: NURSE PRACTITIONER

## 2021-08-22 RX ADMIN — ACETAMINOPHEN 650 MG: 325 TABLET, FILM COATED ORAL at 06:05

## 2021-08-22 RX ADMIN — POTASSIUM CHLORIDE 40 MEQ: 1500 TABLET, EXTENDED RELEASE ORAL at 09:24

## 2021-08-22 RX ADMIN — HEPARIN SODIUM 5000 UNITS: 5000 INJECTION INTRAVENOUS; SUBCUTANEOUS at 13:37

## 2021-08-22 RX ADMIN — ATORVASTATIN CALCIUM 80 MG: 40 TABLET, FILM COATED ORAL at 20:41

## 2021-08-22 RX ADMIN — HEPARIN SODIUM 5000 UNITS: 5000 INJECTION INTRAVENOUS; SUBCUTANEOUS at 20:52

## 2021-08-22 RX ADMIN — FLUOXETINE 40 MG: 20 CAPSULE ORAL at 20:41

## 2021-08-22 RX ADMIN — HYDROCODONE BITARTRATE AND ACETAMINOPHEN 1 TABLET: 5; 325 TABLET ORAL at 09:25

## 2021-08-22 RX ADMIN — DIVALPROEX SODIUM 125 MG: 125 TABLET, DELAYED RELEASE ORAL at 20:41

## 2021-08-22 RX ADMIN — HYDROCODONE BITARTRATE AND ACETAMINOPHEN 1 TABLET: 5; 325 TABLET ORAL at 16:15

## 2021-08-22 RX ADMIN — Medication 10 ML: at 09:25

## 2021-08-22 RX ADMIN — Medication 10 ML: at 20:57

## 2021-08-22 RX ADMIN — OXYBUTYNIN CHLORIDE 10 MG: 10 TABLET, EXTENDED RELEASE ORAL at 09:24

## 2021-08-22 RX ADMIN — METOLAZONE 5 MG: 5 TABLET ORAL at 09:25

## 2021-08-22 RX ADMIN — HYDROCODONE BITARTRATE AND ACETAMINOPHEN 1 TABLET: 5; 325 TABLET ORAL at 20:41

## 2021-08-22 RX ADMIN — HEPARIN SODIUM 5000 UNITS: 5000 INJECTION INTRAVENOUS; SUBCUTANEOUS at 22:23

## 2021-08-22 RX ADMIN — HEPARIN SODIUM 5000 UNITS: 5000 INJECTION INTRAVENOUS; SUBCUTANEOUS at 06:00

## 2021-08-22 RX ADMIN — PANTOPRAZOLE SODIUM 40 MG: 40 TABLET, DELAYED RELEASE ORAL at 06:00

## 2021-08-22 RX ADMIN — CEFTRIAXONE SODIUM 1 G: 1 INJECTION, POWDER, FOR SOLUTION INTRAMUSCULAR; INTRAVENOUS at 11:31

## 2021-08-22 RX ADMIN — DIVALPROEX SODIUM 125 MG: 125 TABLET, DELAYED RELEASE ORAL at 09:25

## 2021-08-22 RX ADMIN — ARIPIPRAZOLE 2 MG: 2 TABLET ORAL at 20:41

## 2021-08-22 RX ADMIN — TOPIRAMATE 150 MG: 100 TABLET, FILM COATED ORAL at 20:41

## 2021-08-22 NOTE — DISCHARGE PLACEMENT REQUEST
"Sarah Fierro (89 y.o. Female)     Date of Birth Social Security Number Address Home Phone MRN    [id number]  [address] [place] [address] [phone] [id number]    [employer] Marital Status          [facility] Single       Admission Date Admission Type Admitting Provider Attending Provider Department, Room/Bed    21 Emergency Keiko Torres MD Jaisinghani, Salgram, MD [facility] 3C MEDICAL INPATIENT, 376/1    Discharge Date Discharge Disposition Discharge Destination                       Attending Provider: Keiko Torres MD    Allergies: Morphine, Nsaids, Sulfa Antibiotics, Vancomycin    Isolation: None   Infection: None   Code Status: No CPR    Ht: 157.5 cm (62\")   Wt: 67.3 kg (148 lb 5.9 oz)    Admission Cmt: None   Principal Problem: Acute encephalopathy [G93.40]                 Active Insurance as of 2021     Primary Coverage     Payor Plan Insurance Group Employer/Plan Group    [facility] MEDICARE REPLACEMENT [facility] MEDICARE REPLACEMENT [id number]     Payor Plan Address Payor Plan Phone Number Payor Plan Fax Number Effective Dates    [address] [address]   2019 - None Entered    [facility] [id number]       Subscriber Name Subscriber Birth Date Member ID       SARAH FIERRO 1931 [id number]                 Emergency Contacts      (Rel.) Home Phone Work Phone Mobile Phone    Malinda Lord (Relative) [phone] -- --               History & Physical      Arturo Miller MD at [address] [employer]              [facility] Medicine Services      Patient Name: Sraah Fierro  : 1931  MRN: [id number]  Primary Care Physician:  Provider, No Known  Date of admission: 2021      Subjective      Chief Complaint: Left leg/arm pain     Information obtained from ED provider as patient is confused.    History of Present Illness: Sarah Fierro is a 89 y.o. female who presented to [facility] on 2021 " complaining of right arm and leg pain.  Per ED provider ECF sent patient in due to patient having complaints of left arm and left foot pain.  ECF also noted patient has had increased confusion.  Upon assessment patient is alert and oriented x3 with confusion noted.  Patient is unsure of why she was brought to the hospital.  She did report she has had generalized weakness lately.      In the ED, X ray left humerus showed Negative for fracture or dislocation. Advanced glenohumeral osteoarthritis.X ray left foot showed Left foot degenerative changes with osteopenia. No acute left foot findings. EKG showed Sinus rhythm, Prolonged QT interval. LUE venous duplex negative.  All labs unremarkable upon admission except potassium 2.4, BUN 27, mag 1.5, urinalysis showed 15 ketones, trace blood, positive nitrite, large leukocytes, 3-5 red blood cells, too numerous to count white blood cells, 3+ bacteria.  Urine culture pending.  All vital signs stable upon admission except blood pressure 169/71.  Patient received Tylenol and Rocephin in the ED.    Review of Systems   Unable to perform ROS: mental status change        Personal History     Past Medical History:   Diagnosis Date   • Allergic rhinitis    • Anemia    • Bipolar 1 disorder (CMS/HCC)    • Broken bones     cheek bone   • Bursitis of knee    • Calculus of bile duct    • Chronic kidney disease     STAGE 3   • Colon polyps    • Depression    • Dysuria    • Fundic gland polyposis of stomach 08/2014   • Gallstones 07/2014    gb sono-stone 7/14   • GERD (gastroesophageal reflux disease)    • H/O complete eye exam 2015   • H/O mammogram 2013   • History of dental examination 06/2016   • Hyperlipidemia    • Hypertension    • Infectious viral hepatitis    • Non-alcoholic fatty liver disease    • Polyp of stomach    • Spinal stenosis    • UTI (urinary tract infection)        Past Surgical History:   Procedure Laterality Date   • BILATERAL BREAST REDUCTION     • CARDIAC  CATHETERIZATION     • COLON SURGERY  08/2014    TUBULAR ADENOMA RESECTED   • COLONOSCOPY  2013   • ENDOSCOPY  08/2014    (Dr Huffman)   • ESOPHAGEAL DILATATION     • OTHER SURGICAL HISTORY  08/2014    bx stomach polyps and colon polyps       Family History: family history includes Adrenal disorder in an other family member; Arthritis in her brother and father; Brain cancer in an other family member; Breast cancer in an other family member; Cancer in her mother, sister, and sister; Colon cancer in her mother and sister; Diabetes in her brother; Heart disease in her sister; Hypertension in her brother and father; Ovarian cancer in her sister; Stroke in her father. Otherwise pertinent FHx was reviewed and not pertinent to current issue.    Social History:  reports that she has quit smoking. She has never used smokeless tobacco. She reports that she does not drink alcohol and does not use drugs.    Home Medications:  Prior to Admission Medications       Prescriptions Last Dose Informant Patient Reported? Taking?    ALPRAZolam (XANAX) 1 MG tablet   Yes No    Take 1 mg by mouth 2 (two) times a day as needed for anxiety.    amLODIPine (NORVASC) 2.5 MG tablet   No No    Take 1 tablet by mouth Daily.    ARIPiprazole (ABILIFY) 2 MG tablet   No No    Take 0.5 tablets by mouth Daily.    atorvastatin (LIPITOR) 80 MG tablet   No No    Take 1 tablet by mouth Every Night.    calcitriol (ROCALTROL) 0.25 MCG capsule   Yes No    TK 1 C PO D    conjugated estrogens (PREMARIN) 0.625 MG/GM vaginal cream   Yes No    Insert  into the vagina Daily.    CRANBERRY PO   Yes No    Take  by mouth.    FLUoxetine (PROzac) 40 MG capsule   Yes No    Take 40 mg by mouth daily.    fluticasone (FLONASE) 50 MCG/ACT nasal spray   Yes No    2 sprays into each nostril daily. Administer 2 sprays in each nostril for each dose.    HYDROcodone-acetaminophen (NORCO)  MG per tablet   Yes No    TK 1/2 TO 1 T PO BID PRN P    lisinopril (PRINIVIL,ZESTRIL)  40 MG tablet   No No    Take 1 tablet by mouth Daily.    Nitrofurantoin Monohyd Macro (MACROBID PO)   Yes No    Take 50 mg by mouth Daily.    omeprazole (priLOSEC) 20 MG capsule   No No    TAKE ONE CAPSULE BY MOUTH EVERY DAY    tolterodine (DETROL) 1 MG tablet   No No    TAKE 1 TABLET BY MOUTH TWICE DAILY    topiramate (TOPAMAX) 50 MG tablet   Yes No    Take 50 mg by mouth Daily. 3 tablets at night times              Allergies:  Allergies   Allergen Reactions   • Morphine Other (See Comments)     Mood changes   • Nsaids    • Sulfa Antibiotics    • Vancomycin Rash       Objective      Vitals:   Temp:  [98.3 °F (36.8 °C)] 98.3 °F (36.8 °C)  Heart Rate:  [70-92] 90  Resp:  [16] 16  BP: (153-182)/(66-96) 153/75    Physical Exam  Vitals reviewed.   Constitutional:       Appearance: Normal appearance. She is normal weight.   HENT:      Head: Normocephalic and atraumatic.      Nose: Nose normal.      Mouth/Throat:      Mouth: Mucous membranes are moist.      Pharynx: Oropharynx is clear.   Eyes:      Extraocular Movements: Extraocular movements intact.      Conjunctiva/sclera: Conjunctivae normal.      Pupils: Pupils are equal, round, and reactive to light.   Cardiovascular:      Rate and Rhythm: Normal rate and regular rhythm.      Pulses: Normal pulses.      Heart sounds: Normal heart sounds.      Comments: S1, S2 audible  Pulmonary:      Effort: Pulmonary effort is normal.      Breath sounds: Normal breath sounds.      Comments: On room air   Abdominal:      General: Abdomen is flat. Bowel sounds are normal.      Palpations: Abdomen is soft.   Musculoskeletal:      Cervical back: Normal range of motion.      Comments: Generalized weakness, swelling RLE    Skin:     General: Skin is warm and dry.      Findings: Erythema present.      Comments: RLE hot to touch, erythema noted RUE and RLE    Neurological:      General: No focal deficit present.      Mental Status: She is alert and oriented to person, place, and time.       Comments: Slight confusion noted   Psychiatric:         Mood and Affect: Mood normal.         Behavior: Behavior normal.         Result Review    Result Review:  I have personally reviewed the results from the time of this admission to 8/18/2021 12:24 EDT and agree with these findings:  [x]  Laboratory  [x]  Microbiology  [x]  Radiology  [x]  EKG/Telemetry   []  Cardiology/Vascular   []  Pathology  []  Old records  []  Other:  Most notable findings include: potassium 2.4, BUN 27, mag 1.5, urinalysis showed 15 ketones, trace blood, positive nitrite, large leukocytes, 3-5 red blood cells, too numerous to count white blood cells, 3+ bacteria.      Assessment/Plan        Active Hospital Problems:  Active Hospital Problems    Diagnosis    • **Cellulitis    • Left arm pain    • Left foot pain    • Hypokalemia    • Hypomagnesemia    • Right arm cellulitis    • OAB (overactive bladder)    • Anxiety state    • Benign essential HTN    • Bipolar 1 disorder (CMS/[facility])    • Hyperlipemia    • Gastric reflux    • Depressive disorder    • CKD (chronic kidney disease), stage III (CMS/[facility])    • Diabetes mellitus with neurological manifestation (CMS/[facility])      Plan:     Acute encephalopathy   - Secondary to UTI  - Currently alert and oriented X3 with some confusion noted   - UA reviewed  - Urine culture pending  - Received rocephin in ED  - Continue Rocpehin    Acute RLE cellulitis  - Venous duplex RLE ordered  - Continue rocephin above, (patient allergic to vancomycin)  - Obtain blood cultures  - Check ESR, lactic acid,  and CRP     Acute RUE Cellulitis   - Venous duplex ordered   - Treatment as above    Acute hypokalemia  - K 2.4, monitor  - Electrolyte protocol ordered     Acute hypomagnesemia  - Mag 1.5, monitor  - Electrolyte protocol ordered     Left arm pain   - Likely secondary to arthritis   - Reported from ECF   - X ray left humerus reviewed  - pain medication ordered    Left foot pain  - X ray left foot reviewed  - Reported  from ECF  - pain medication ordered     Essential hypertension  -Uncontrolled blood pressure 169/71 upon admission  -Monitor blood pressure  -Continue amlodipine, lisinopril, and metolazone  - Holding home lasix   - IV hydralazine ordered PRN     Hyperlipidemia  -Continue zetia and statin    CKD stage III  - CR 1.03, monitor   -Continue calcitriol    Anxiety with depression  - Stable  -Continue Prozac, Topamax, depakote, and Abilify    Overactive bladder  -Continue Ditropan     GERD  -Continue PPI    Diabetes mellitus  - Start sliding scale, Accuchecks ACHS  - Check hbg A1C     Chronic pain  - Continue hydrocodone (INSPECT verified)     DVT prophylaxis:  No DVT prophylaxis order currently exists.    CODE STATUS:    Limited Support to NOT Include: Intubation; Cardioversion/Defibrillation  Level Of Support Discussed With: Patient  Code Status: No CPR  Medical Interventions (Level of Support Prior to Arrest): Limited    Admission Status:  I believe this patient meets Inpatient status.    I discussed the patient's findings and my recommendations with patient and nursing staff.    This patient has been examined wearing appropriate Personal Protective Equipment.  08/18/21      Signature: Electronically signed by CYN Mott, 08/18/21, 2:39 PM EDT.    I saw and examined this wonderful patient with Mrs. Hernandez.  I have reviewed the notes above and I agreed with the plan of care.    Patient is an 89-year-old female who presented to the emergency room with altered mental status and was diagnosed  Acute UTI, acute encephalopathy, and a cellulitis.  Antibiotics were started after cultures were completed.    Physical examination  General: NAD.  HEENT: Head with no contusions.  Pupils equal and reactive to light  CVS: S1 and S2 present.  No tachycardia.  Pulmonary: Good air entry bilaterally.  No wheezing.  Abdomen: Soft nontender nondistended positive bowel sounds.  Extremities.  No edema.  Pulses  intact.  Musculoskeletal: Positive extremities tenderness.  Hematology: No obvious signs of bleeding.  No jaundice.      Assessment and plan.    Treat acute UTI with antibiotics.  Treat cellulitis with antibiotics.  Treat your musculoskeletal pain syndrome with Roxicodone.  Treat acute encephalopathy with supportive care.  Complete neurology consult.  Continue appropriate patient's home medications.  Continue present level of care.  Patient and family agreed with the plan of care.    Electronically signed by Arturo Miller MD at 08/19/21 1522          Physical Therapy Notes (last 24 hours) (Notes from 08/21/21 1408 through 08/22/21 1408)      Gricelda Ruiz PTA at 08/21/21 1721  Version 1 of 1       Subjective: Pt agreeable to therapeutic plan of care. Patient stated she was pretty worn out but agreed to sit EOB to finish eating supper    Objective:     Bed mobility - Mod-A  Transfers - Mod-A and with rolling walker  Ambulation -  feet N/A or Not attempted.    Pain: c/o pain LUE at wrist/hand.   Education: Provided education on importance of mobility and skilled verbal / tactile cueing throughout intervention.     Assessment: Sarah Zendejas presents with functional mobility impairments which indicate the need for skilled intervention. Tolerating session today without incident. Patient tolerated EOB x 30 mins ot finish eating supper. Req vc's to bring trunk forward and sit upright. Pt fearful of falling . Able to side step towards HOB with min assist and vc's for proper wt shifitng and posture. Will benefit from rehab to see if she can return to [facility]. Will continue to follow and progress as tolerated.     Plan/Recommendations:   Pt would benefit from Inpatient Rehabilitation placement at discharge from facility and requires no DME at discharge.   Pt desires Inpatient Rehabilitation placement at discharge. Pt cooperative; agreeable to therapeutic recommendations and plan of care.     Basic Mobility  6-click:  Rollin = Total, A lot = 2, A little = 3; 4 = None  Supine>Sit:   1 = Total, A lot = 2, A little = 3; 4 = None   Sit>Stand with arms:  1 = Total, A lot = 2, A little = 3; 4 = None  Bed>Chair:   1 = Total, A lot = 2, A little = 3; 4 = None  Ambulate in room:  1 = Total, A lot = 2, A little = 3; 4 = None  3-5 Steps with railin = Total, A lot = 2, A little = 3; 4 = None  Score: 11    Modified [place]: 4 = Moderately severe disability (Unable to attend to own bodily needs without assistance, and unable to walk unassisted)     Post-Tx Position: Supine with HOB Elevated, Alarms activated and Call light and personal items within reach  PPE: gloves, surgical mask, eyewear protection    Electronically signed by Gricelda Ruiz, PTA at 21 1733     Gricelda Ruiz, PTA at 21 1721  Version 1 of 1        Assessment: Sarah Zendejas presents with functional mobility impairments which indicate the need for skilled intervention. Tolerating session today without incident. Patient tolerated EOB x 30 mins ot finish eating supper. Req vc's to bring trunk forward and sit upright. Pt fearful of falling . Able to side step towards HOB with min assist and vc's for proper wt shifitng and posture. Will benefit from rehab to see if she can return to [facility]. Will continue to follow and progress as tolerated.

## 2021-08-22 NOTE — PROGRESS NOTES
[facility] Medicine Services Daily Progress Note    Patient Name: Sarah Zendejas  : 1931  MRN: [id number]  Primary Care Physician:  Victoria Naik  Date of admission: 2021      Subjective      Chief Complaint: Altered mental status.    Subjective   Alert and oriented times time 3, waiting on placement, denies for any new complaint. No nausea or vomiting.    Review of Systems   Constitutional: Negative.   HENT: Negative.    Eyes: Negative.    Cardiovascular: Negative.    Respiratory: Negative.    Endocrine: Negative.    Hematologic/Lymphatic: Negative.    Skin: Negative.    Musculoskeletal: Negative.    Gastrointestinal: Negative.    Genitourinary: Negative.    Neurological: Negative.    Psychiatric/Behavioral: Negative.    Allergic/Immunologic: Negative.             Objective      Vitals:   Temp:  [97.6 °F (36.4 °C)-98.6 °F (37 °C)] 97.6 °F (36.4 °C)  Heart Rate:  [66-67] 67  Resp:  [18-20] 18  BP: (119-132)/(67-86) 119/67    Physical Exam  Vitals and nursing note reviewed.   Constitutional:       General: She is not in acute distress.  HENT:      Head: Normocephalic and atraumatic.      Nose: Nose normal. No congestion or rhinorrhea.      Mouth/Throat:      Mouth: Mucous membranes are moist.      Pharynx: Oropharynx is clear. No oropharyngeal exudate or posterior oropharyngeal erythema.   Eyes:      Pupils: Pupils are equal, round, and reactive to light.   Cardiovascular:      Pulses: Normal pulses.      Heart sounds: Normal heart sounds. No murmur heard.   No friction rub. No gallop.       Comments: S1 and S2 present.  No tachycardia.  Pulmonary:      Effort: No respiratory distress.      Breath sounds: No wheezing, rhonchi or rales.   Chest:      Chest wall: No tenderness.   Abdominal:      General: Abdomen is flat. Bowel sounds are normal. There is no distension.      Palpations: Abdomen is soft.      Tenderness: There is no right CVA tenderness.   Musculoskeletal:          General: No swelling, tenderness, deformity or signs of injury.      Cervical back: Neck supple. No tenderness.      Right lower leg: No edema.      Left lower leg: No edema.   Skin:     Capillary Refill: Capillary refill takes less than 2 seconds.      Coloration: Skin is not jaundiced.      Findings: No bruising, lesion or rash.   Neurological:      Mental Status: She is alert.      Comments: No facial asymmetry noted.  Gait and station not tested.   Psychiatric:      Comments: No agitation.               Result Review    Result Review:  I have personally reviewed the results from the time of this admission to 8/22/2021 13:46 EDT and agree with these findings:  [x]  Laboratory  [x]  Microbiology  [x]  Radiology  []  EKG/Telemetry   []  Cardiology/Vascular   []  Pathology  []  Old records  []  Other:  Most notable findings include: WBC 12.0.  Hemoglobin 11.4.  Hematocrit 34.3.  Platelets 248,000.          Assessment/Plan      Brief Patient Summary:  Patient is an 89-year-old female with past medical history GERD, bipolar 1 disorder, anemia, depression, CKD stage III,  hypertension, hyperlipidemia, spinal stenosis who presented to the emergency room from an assisted living facility because of altered mental status.  Patient was seen in in the emergency room and was diagnosed with cellulitis.  Patient was also diagnosed with a urinary tract infection and acute encephalopathy.  Neurology consult was completed.  Patient is pending approval to go to rehab.      ARIPiprazole, 2 mg, Oral, Nightly  atorvastatin, 80 mg, Oral, Nightly  cefTRIAXone, 1 g, Intravenous, Q24H  divalproex, 125 mg, Oral, BID  ezetimibe, 10 mg, Oral, Daily  FLUoxetine, 40 mg, Oral, Nightly  heparin (porcine), 5,000 Units, Subcutaneous, Q8H  HYDROcodone-acetaminophen, 1 tablet, Oral, TID  metOLazone, 5 mg, Oral, Daily  oxybutynin XL, 10 mg, Oral, Daily  pantoprazole, 40 mg, Oral, QAM  sodium chloride, 10 mL, Intravenous, Q12H  topiramate, 150 mg,  Oral, Nightly             Active Hospital Problems:  Active Hospital Problems    Diagnosis    • **Acute encephalopathy  Treat with supportive care.  Follow neurology recommendations.      • Right arm cellulitis  Treat with antibiotics.      • Acute UTI (urinary tract infection)  Treat with antibiotics.  Follow cultures.      • Left arm pain  Treat with pain control, [place].      • Left foot pain  Treat with pain control, Norco.      • Hypokalemia  Replete.      • Hypomagnesemia  Replete.      • Cellulitis  Treat with antibiotics.      • OAB (overactive bladder)  Treat with oxybutynin.      • Anxiety state  Treat with Prozac.      • Benign essential HTN  Continue to monitor.    • Bipolar 1 disorder (CMS/[facility])    • Hyperlipemia  Treat with Zetia and Lipitor.      • Gastric reflux  Treat with Protonix.      • Depressive disorder  Treat with Abilify.      • CKD (chronic kidney disease), stage III (CMS/[facility])  Continue to monitor.      Leukocytosis.  Complete 2 sets of blood cultures.  Continue to monitor.           Awaited Pre-cert once arranged ..... will discharge the patent.     patient will require 30 days or less of rehab.      DVT prophylaxis:  Medical DVT prophylaxis orders are present.    CODE STATUS:    Limited Support to NOT Include: Intubation; Cardioversion/Defibrillation  Level Of Support Discussed With: Patient  Code Status: No CPR  Medical Interventions (Level of Support Prior to Arrest): Limited      Disposition: Pending patient's clinical improvement.    This patient has been examined wearing appropriate Personal Protective Equipment and discussed with hospital infection control department, [facility] department, infectious disease specialist and pulmonologist. 08/22/21      Electronically signed by Keiko Torres MD, FACP, 08/22/21, 13:46 EDT.      [facility] Hospitalist Team

## 2021-08-22 NOTE — CASE MANAGEMENT/SOCIAL WORK
Continued Stay Note  CARMEN Dickerson     Patient Name: Sarah Zendejas  MRN: [id number]  Today's Date: 8/22/2021    Admit Date: 8/18/2021    Discharge Plan     Row Name 08/22/21 1415       Plan    Plan  [facility] denied. [place] acccepted; pending precert. PASRR approved. Precert started 8/20    Patient/Family in Agreement with Plan  yes    Plan Comments  Received notice from the patient's niece that she would like to change their first choice to [facility]. Referral placed. Spoke with the liaison and they do not have bed availability until Friday. She said she had spoken with the niece to let her know.            Expected Discharge Date and Time     Expected Discharge Date Expected Discharge Time    Aug 23, 2021         Phone communication or documentation only - no physical contact with patient or family.    Letty Bajwa RN

## 2021-08-22 NOTE — PLAN OF CARE
Goal Outcome Evaluation:  Plan of Care Reviewed With: patient        Progress: no change  Outcome Summary: pt is currently resting in bed with no complaints of pain at this time. pt is waiting on placement to be discharged. pt has been encouraged and assisted to turn to help prevent skin breakdown.

## 2021-08-23 PROBLEM — N30.01 ACUTE CYSTITIS WITH HEMATURIA: Status: ACTIVE | Noted: 2021-08-23

## 2021-08-23 LAB
BACTERIA SPEC AEROBE CULT: NORMAL
BACTERIA SPEC AEROBE CULT: NORMAL
MAGNESIUM SERPL-MCNC: 1.6 MG/DL (ref 1.6–2.4)
MAGNESIUM SERPL-MCNC: 1.6 MG/DL (ref 1.6–2.4)
POTASSIUM SERPL-SCNC: 3.6 MMOL/L (ref 3.5–5.2)
POTASSIUM SERPL-SCNC: 3.7 MMOL/L (ref 3.5–5.2)

## 2021-08-23 PROCEDURE — 96372 THER/PROPH/DIAG INJ SC/IM: CPT

## 2021-08-23 PROCEDURE — 97530 THERAPEUTIC ACTIVITIES: CPT

## 2021-08-23 PROCEDURE — 84132 ASSAY OF SERUM POTASSIUM: CPT | Performed by: HOSPITALIST

## 2021-08-23 PROCEDURE — 25010000003 MAGNESIUM SULFATE 4 GM/100ML SOLUTION: Performed by: NURSE PRACTITIONER

## 2021-08-23 PROCEDURE — G0378 HOSPITAL OBSERVATION PER HR: HCPCS

## 2021-08-23 PROCEDURE — 83735 ASSAY OF MAGNESIUM: CPT | Performed by: HOSPITALIST

## 2021-08-23 PROCEDURE — 84132 ASSAY OF SERUM POTASSIUM: CPT | Performed by: NURSE PRACTITIONER

## 2021-08-23 PROCEDURE — 83735 ASSAY OF MAGNESIUM: CPT | Performed by: NURSE PRACTITIONER

## 2021-08-23 PROCEDURE — 25010000002 HEPARIN (PORCINE) PER 1000 UNITS: Performed by: NURSE PRACTITIONER

## 2021-08-23 PROCEDURE — 99225 PR SBSQ OBSERVATION CARE/DAY 25 MINUTES: CPT | Performed by: HOSPITALIST

## 2021-08-23 PROCEDURE — 25010000002 CEFTRIAXONE PER 250 MG: Performed by: NURSE PRACTITIONER

## 2021-08-23 RX ORDER — CEPHALEXIN 500 MG/1
500 CAPSULE ORAL 2 TIMES DAILY
Qty: 12 CAPSULE | Refills: 0 | Status: SHIPPED | OUTPATIENT
Start: 2021-08-23

## 2021-08-23 RX ADMIN — HYDROCODONE BITARTRATE AND ACETAMINOPHEN 1 TABLET: 5; 325 TABLET ORAL at 20:52

## 2021-08-23 RX ADMIN — ATORVASTATIN CALCIUM 80 MG: 40 TABLET, FILM COATED ORAL at 20:52

## 2021-08-23 RX ADMIN — CEFTRIAXONE SODIUM 1 G: 1 INJECTION, POWDER, FOR SOLUTION INTRAMUSCULAR; INTRAVENOUS at 12:21

## 2021-08-23 RX ADMIN — PANTOPRAZOLE SODIUM 40 MG: 40 TABLET, DELAYED RELEASE ORAL at 08:43

## 2021-08-23 RX ADMIN — Medication 10 ML: at 10:00

## 2021-08-23 RX ADMIN — ARIPIPRAZOLE 2 MG: 2 TABLET ORAL at 20:52

## 2021-08-23 RX ADMIN — DIVALPROEX SODIUM 125 MG: 125 TABLET, DELAYED RELEASE ORAL at 20:52

## 2021-08-23 RX ADMIN — HEPARIN SODIUM 5000 UNITS: 5000 INJECTION INTRAVENOUS; SUBCUTANEOUS at 05:44

## 2021-08-23 RX ADMIN — MAGNESIUM SULFATE HEPTAHYDRATE 4 G: 4 INJECTION, SOLUTION INTRAVENOUS at 23:08

## 2021-08-23 RX ADMIN — HYDROCODONE BITARTRATE AND ACETAMINOPHEN 1 TABLET: 5; 325 TABLET ORAL at 08:43

## 2021-08-23 RX ADMIN — DIVALPROEX SODIUM 125 MG: 125 TABLET, DELAYED RELEASE ORAL at 08:43

## 2021-08-23 RX ADMIN — Medication 10 ML: at 20:52

## 2021-08-23 RX ADMIN — HEPARIN SODIUM 5000 UNITS: 5000 INJECTION INTRAVENOUS; SUBCUTANEOUS at 21:03

## 2021-08-23 RX ADMIN — HYDROCODONE BITARTRATE AND ACETAMINOPHEN 1 TABLET: 5; 325 TABLET ORAL at 15:05

## 2021-08-23 RX ADMIN — TOPIRAMATE 150 MG: 100 TABLET, FILM COATED ORAL at 20:52

## 2021-08-23 RX ADMIN — OXYBUTYNIN CHLORIDE 10 MG: 10 TABLET, EXTENDED RELEASE ORAL at 08:43

## 2021-08-23 RX ADMIN — METOLAZONE 5 MG: 5 TABLET ORAL at 08:43

## 2021-08-23 RX ADMIN — FLUOXETINE 40 MG: 20 CAPSULE ORAL at 20:52

## 2021-08-23 RX ADMIN — HEPARIN SODIUM 5000 UNITS: 5000 INJECTION INTRAVENOUS; SUBCUTANEOUS at 15:05

## 2021-08-23 NOTE — PLAN OF CARE
Problem: Adult Inpatient Plan of Care  Goal: Plan of Care Review  Outcome: Ongoing, Progressing  Goal: Absence of Hospital-Acquired Illness or Injury  Outcome: Ongoing, Progressing  Intervention: Identify and Manage Fall Risk  Recent Flowsheet Documentation  Taken 8/23/2021 0100 by Bhumika Chavarria RN  Safety Promotion/Fall Prevention: safety round/check completed  Taken 8/22/2021 2300 by Bhumika Chavarria RN  Safety Promotion/Fall Prevention:   assistive device/personal items within reach   clutter free environment maintained   fall prevention program maintained   safety round/check completed  Taken 8/22/2021 2100 by Bhumika Chavarria RN  Safety Promotion/Fall Prevention:   assistive device/personal items within reach   fall prevention program maintained   nonskid shoes/slippers when out of bed   safety round/check completed   clutter free environment maintained  Taken 8/22/2021 1928 by Bhumika Chavarria RN  Safety Promotion/Fall Prevention:   safety round/check completed   nonskid shoes/slippers when out of bed   fall prevention program maintained  Intervention: Prevent Skin Injury  Recent Flowsheet Documentation  Taken 8/22/2021 1928 by Bhumika Chavarria RN  Skin Protection:   adhesive use limited   skin-to-device areas padded   protective footwear used  Intervention: Prevent and Manage VTE (venous thromboembolism) Risk  Recent Flowsheet Documentation  Taken 8/22/2021 1928 by Bhumika Chavarria RN  VTE Prevention/Management: patient refused intervention  Intervention: Prevent Infection  Recent Flowsheet Documentation  Taken 8/23/2021 0100 by Bhumika Chavarria RN  Infection Prevention: rest/sleep promoted  Taken 8/22/2021 2300 by Bhumika Chavarria RN  Infection Prevention:   single patient room provided   rest/sleep promoted  Taken 8/22/2021 2100 by Bhumika Chavarria RN  Infection Prevention:   rest/sleep promoted   single patient room provided  Taken 8/22/2021 1928 by Bhumika Chavarria RN  Infection Prevention:   single patient room  provided   rest/sleep promoted  Goal: Optimal Comfort and Wellbeing  Outcome: Ongoing, Progressing     Problem: Fall Injury Risk  Goal: Absence of Fall and Fall-Related Injury  Outcome: Ongoing, Progressing  Intervention: Identify and Manage Contributors to Fall Injury Risk  Recent Flowsheet Documentation  Taken 8/22/2021 1928 by Bhumika Chavarria RN  Medication Review/Management: medications reviewed  Intervention: Promote Injury-Free Environment  Recent Flowsheet Documentation  Taken 8/23/2021 0100 by Bhumika Chavarria RN  Safety Promotion/Fall Prevention: safety round/check completed  Taken 8/22/2021 2300 by Bhumika Chavarria RN  Safety Promotion/Fall Prevention:   assistive device/personal items within reach   clutter free environment maintained   fall prevention program maintained   safety round/check completed  Taken 8/22/2021 2100 by Bhumika Chavarria RN  Safety Promotion/Fall Prevention:   assistive device/personal items within reach   fall prevention program maintained   nonskid shoes/slippers when out of bed   safety round/check completed   clutter free environment maintained  Taken 8/22/2021 1928 by Bhumika Chavarria RN  Safety Promotion/Fall Prevention:   safety round/check completed   nonskid shoes/slippers when out of bed   fall prevention program maintained     Problem: Skin Injury Risk Increased  Goal: Skin Health and Integrity  Outcome: Ongoing, Progressing  Intervention: Optimize Skin Protection  Recent Flowsheet Documentation  Taken 8/22/2021 1928 by Bhumika Chavarria RN  Pressure Reduction Techniques: frequent weight shift encouraged  Pressure Reduction Devices: pressure-redistributing mattress utilized  Skin Protection:   adhesive use limited   skin-to-device areas padded   protective footwear used   Goal Outcome Evaluation:

## 2021-08-23 NOTE — DISCHARGE PLACEMENT REQUEST
"Sarah Zendejas (89 y.o. Female)     Date of Birth Social Security Number Address Home Phone MRN    12/23/1931  [facility] [address] [phone] [id number]    Holiness Marital Status          Tenriism Single       Admission Date Admission Type Admitting Provider Attending Provider Department, Room/Bed    8/18/21 Emergency Keiko Torres MD Jaisinghani, Salgram, MD [facility] 3C MEDICAL INPATIENT, 376/1    Discharge Date Discharge Disposition Discharge Destination                       Attending Provider: Keiko Torres MD    Allergies: Morphine, Nsaids, Sulfa Antibiotics, Vancomycin    Isolation: None   Infection: None   Code Status: No CPR    Ht: 157.5 cm (62\")   Wt: 68.4 kg (150 lb 12.7 oz)    Admission Cmt: None   Principal Problem: Acute encephalopathy [G93.40]                 Active Insurance as of 8/18/2021     Primary Coverage     Payor Plan Insurance Group Employer/Plan Group    [facility] MEDICARE REPLACEMENT [facility] MEDICARE REPLACEMENT [id number]     Payor Plan Address Payor Plan Phone Number Payor Plan Fax Number Effective Dates    [address] [address]   1/1/2019 - None Entered    [facility] [id number]       Subscriber Name Subscriber Birth Date Member ID       SARAH ZENDEJAS 12/23/1931 [id number]                 Emergency Contacts      (Rel.) Home Phone Work Phone Mobile Phone    Deirdre Lordti (Relative) [phone] -- --              "

## 2021-08-23 NOTE — CASE MANAGEMENT/SOCIAL WORK
Continued Stay Note   Tuan     Patient Name: Sarah Zendejas  MRN: [id number]  Today's Date: 8/23/2021    Admit Date: 8/18/2021    Discharge Plan     Row Name 08/23/21 1210       Plan    Plan  Received call from POA and she states she told weekend  she did not want [facility] and she refuses patiet to go there. Wants [facility] referral. Referral to [facility] is pending acceptance and will need precert    Plan Comments  Received call from POA, that she told cm on weekend she doesnt want [facility]. Refusing to go there and states she told  over weekend she wants [facility] referral. Referral to [facility] canceled and referral made to [facility] pending precert and acceptance          Expected Discharge Date and Time     Expected Discharge Date Expected Discharge Time    Aug 25, 2021             Allison Beltran RN   Phone communication or documentation only - no physical contact with patient or family.

## 2021-08-23 NOTE — PLAN OF CARE
"With encouragement, Sarah is able to perform bed mobility, sit<>stand x 2 and short distance transfer to bedside chair. Once in chair, pt is able to change gown with set up from therapist. Reports fatigue with minimal activity and carries significant self doubt and fear of falling. Constantly states \"I can't\" when she is actively performing the task that is requested of her. Will continue to follow and progress as tolerated. Continuing to recommend IP Rehab as Independent living would not be able to accommodate her current needs.   "

## 2021-08-23 NOTE — PLAN OF CARE
Goal Outcome Evaluation:  Plan of Care Reviewed With: patient, other (see comments)           Outcome Summary: Pt is here from [facility] after having a all. She is noted to have a UTI with IV antiboitics orfered. Plans are to d/c to a rehab fac. POA refused for her to go to [facility]. [facility]'t have bed available. Possible d/c tomorrow. Will continue to observe.

## 2021-08-23 NOTE — CASE MANAGEMENT/SOCIAL WORK
Continued Stay Note  [facility]     Patient Name: Sarah Zendejas  MRN: [id number]  Today's Date: 8/23/2021    Admit Date: 8/18/2021    Discharge Plan     Row Name 08/23/21 1323       Plan    Plan  [facility] accepted patient to subacute. starting precert today. Pharmacy updated to [facility] acute    Plan Comments  call to JOSELITO garcia to let her know patient is accepted to [facility] subacute pending precert. Verbalizes understanding.    Row Name 08/23/21 1210       Plan    Plan  Received call from POA and she states she told weekend  she did not want [facility] and she refuses patiet to go there. Wants [facility] referral. Referral to [facility] is pending acceptance and will need precert    Plan Comments  Received call from POA, that she told cm on weekend she doesnt want [facility]. Refusing to go there and states she told  over weekend she wants [facility] referral. Referral to [facility] canceled and referral made to [facility] pending precert and acceptance        Expected Discharge Date and Time     Expected Discharge Date Expected Discharge Time    Aug 25, 2021             Allison Beltran RN

## 2021-08-23 NOTE — DISCHARGE SUMMARY
[facility] Medicine Services  DISCHARGE SUMMARY    Patient Name: Sarah Zendejas  : 1931  MRN: [id number]    Date of Admission: 2021  Date of Discharge:  2021  Primary Care Physician: Victoria Naik      Presenting Problem:   Hypokalemia [E87.6]  Hypomagnesemia [E83.42]  Acute cystitis with hematuria [N30.01]  Acute encephalopathy [G93.40]  Acute renal impairment [N28.9]    Active and Resolved Hospital Problems:  Active Hospital Problems    Diagnosis POA   • **Acute encephalopathy [G93.40] Yes   • Left arm pain [M79.602] Yes   • Left foot pain [M79.672] Yes   • Hypokalemia [E87.6] Yes   • Hypomagnesemia [E83.42] Yes   • Cellulitis [L03.90] Yes   • Right arm cellulitis [L03.113] Yes   • Acute UTI (urinary tract infection) [N39.0] Yes   • OAB (overactive bladder) [N32.81] Yes   • Anxiety state [F41.1] Yes   • Benign essential HTN [I10] Yes   • Bipolar 1 disorder (CMS/HCC) [F31.9] Yes   • Hyperlipemia [E78.5] Yes   • Gastric reflux [K21.9] Yes   • Depressive disorder [F32.9] Yes   • CKD (chronic kidney disease), stage III (CMS/HCC) [N18.30] Yes      Resolved Hospital Problems   No resolved problems to display.         Hospital Course     Hospital Course by problem list.    89-year-old female admitted after she was found somewhat confused, patient now back to her normal, patient alert and oriented x3.  Patient was recognized to have cellulitis, treated with IV Rocephin, now almost resolved, changed to oral Keflex for few more days on discharge.  Realizing advanced age and some physical deconditioning, PT OT saw the patient, now rehab has been arranged with the patient will be discharged to in a stable condition.    Condition on discharge stable.    Greater than 30 minutes in arranging for the discharge        •      •      •      •      •      •      •      •      •      •      •      •      •      •      •      •               DISCHARGE Follow Up with PCP in a week  time.    Reasons For Change In Medications and Indications for New Medications:      Day of Discharge     Vital Signs:  Temp:  [97.6 °F (36.4 °C)-97.9 °F (36.6 °C)] 97.9 °F (36.6 °C)  Heart Rate:  [63-69] 69  Resp:  [18-19] 19  BP: (112-150)/(69-82) 127/71    Physical Exam:  Physical Exam  Vitals and nursing note reviewed.   Constitutional:       General: She is not in acute distress.     Appearance: Normal appearance. She is well-developed. She is not ill-appearing, toxic-appearing or diaphoretic.   HENT:      Head: Normocephalic and atraumatic.      Right Ear: Ear canal and external ear normal.      Left Ear: Ear canal and external ear normal.      Nose: Nose normal. No congestion or rhinorrhea.      Mouth/Throat:      Mouth: Mucous membranes are moist.      Pharynx: No oropharyngeal exudate.   Eyes:      General: No scleral icterus.        Right eye: No discharge.         Left eye: No discharge.      Extraocular Movements: Extraocular movements intact.      Conjunctiva/sclera: Conjunctivae normal.      Pupils: Pupils are equal, round, and reactive to light.   Neck:      Thyroid: No thyromegaly.      Vascular: No carotid bruit or JVD.      Trachea: No tracheal deviation.   Cardiovascular:      Rate and Rhythm: Normal rate and regular rhythm.      Pulses: Normal pulses.      Heart sounds: Normal heart sounds. No murmur heard.   No friction rub. No gallop.    Pulmonary:      Effort: Pulmonary effort is normal. No respiratory distress.      Breath sounds: Normal breath sounds. No stridor. No wheezing, rhonchi or rales.   Chest:      Chest wall: No tenderness.   Abdominal:      General: Bowel sounds are normal. There is no distension.      Palpations: Abdomen is soft. There is no mass.      Tenderness: There is no abdominal tenderness. There is no guarding or rebound.      Hernia: No hernia is present.   Musculoskeletal:         General: No swelling, tenderness, deformity or signs of injury. Normal range of motion.       Cervical back: Normal range of motion and neck supple. No rigidity. No muscular tenderness.      Right lower leg: No edema.      Left lower leg: No edema.   Lymphadenopathy:      Cervical: No cervical adenopathy.   Skin:     General: Skin is warm and dry.      Coloration: Skin is not jaundiced or pale.      Findings: No bruising, erythema or rash.   Neurological:      General: No focal deficit present.      Mental Status: She is alert and oriented to person, place, and time. Mental status is at baseline.      Cranial Nerves: No cranial nerve deficit.      Sensory: No sensory deficit.      Motor: No weakness or abnormal muscle tone.      Coordination: Coordination normal.   Psychiatric:         Mood and Affect: Mood normal.         Behavior: Behavior normal.         Thought Content: Thought content normal.         Judgment: Judgment normal.            Pertinent  and/or Most Recent Results     LAB RESULTS:      Lab 08/22/21  0355 08/19/21  0212 08/18/21  1345 08/18/21  1021 08/18/21  1019   WBC 7.50 12.00*  --   --  10.40   HEMOGLOBIN 11.9* 11.4*  --   --  12.4   HEMATOCRIT 35.2 34.3  --   --  36.6   PLATELETS 361 248  --   --  248   NEUTROS ABS 4.20  --   --   --  8.20*   LYMPHS ABS 1.90  --   --   --  0.80   MONOS ABS 1.00*  --   --   --  1.40*   EOS ABS 0.40  --   --   --  0.00   MCV 92.0 93.1  --   --  93.6   SED RATE  --   --   --   --  48*   CRP  --   --   --   --  6.18*   LACTATE  --   --  0.8 0.7  --          Lab 08/23/21  0427 08/22/21  0355 08/21/21  0425 08/20/21  1456 08/20/21  0416 08/19/21  0212 08/19/21  0212 08/18/21  1345 08/18/21  1019 08/18/21  1019   SODIUM  --  135*  --   --   --   --  135*  --   --  137   POTASSIUM 3.6 3.3* 3.7 4.1 3.2*   < > 2.9*  --    < > 2.4*   CHLORIDE  --  100  --   --   --   --  99  --   --  95*   CO2  --  23.0  --   --   --   --  25.0  --   --  28.0   ANION GAP  --  12.0  --   --   --   --  11.0  --   --  14.0   BUN  --  24*  --   --   --   --  19  --   --  27*    CREATININE  --  0.90  --   --   --   --  0.79  --   --  1.03*   GLUCOSE  --  95  --   --   --   --  106*  --   --  99   CALCIUM  --  8.2*  --   --   --   --  8.4*  --   --  8.8   MAGNESIUM 1.6 1.7 1.9  --  2.6*  --  1.7  --    < > 1.5*   HEMOGLOBIN A1C  --   --   --   --   --   --   --  5.8*  --   --     < > = values in this interval not displayed.         Lab 08/18/21  1019   TOTAL PROTEIN 7.0   ALBUMIN 3.50   GLOBULIN 3.5   ALT (SGPT) 8   AST (SGOT) 21   BILIRUBIN 0.6   ALK PHOS 101                     Brief Urine Lab Results  (Last result in the past 365 days)      Color   Clarity   Blood   Leuk Est   Nitrite   Protein   CREAT   Urine HCG        08/18/21 1033 Yellow Cloudy Trace Large (3+) Positive Negative             Microbiology Results (last 10 days)     Procedure Component Value - Date/Time    Blood Culture - Blood, Hand, Left [[id number]] Collected: 08/18/21 1345    Lab Status: Preliminary result Specimen: Blood from Hand, Left Updated: 08/22/21 1416     Blood Culture No growth at 4 days    Blood Culture - Blood, Hand, Left [[id number]] Collected: 08/18/21 1343    Lab Status: Preliminary result Specimen: Blood from Hand, Left Updated: 08/22/21 1416     Blood Culture No growth at 4 days    COVID-19,CEPHEID/JORDYN/BDMAX,COR/JAYY/PAD/HAYLIE IN-HOUSE(OR EMERGENT/ADD-ON),NP SWAB IN TRANSPORT MEDIA 3-4 HR TAT, RT-PCR - Swab, Nasopharynx [[id number]]  (Normal) Collected: 08/18/21 1230    Lab Status: Final result Specimen: Swab from Nasopharynx Updated: 08/18/21 1252     COVID19 Not Detected    Narrative:      Fact sheet for providers: https://www.fda.gov/media/373488/download     Fact sheet for patients: https://www.fda.gov/media/803851/download  Fact sheet for providers: https://www.fda.gov/media/169014/download    Fact sheet for patients: https://www.fda.gov/media/445386/download    Test performed by PCR.    Urine Culture - Urine, Urine, Catheter [[id number]]  (Abnormal)  (Susceptibility) Collected: 08/18/21 1033    Lab  Status: Final result Specimen: Urine, Catheter Updated: 08/20/21 0953     Urine Culture >100,000 CFU/mL Escherichia coli    Susceptibility      Escherichia coli      TRUE      Ampicillin Susceptible      Ampicillin + Sulbactam Susceptible      Cefazolin Susceptible      Cefepime Susceptible      Ceftazidime Susceptible      Ceftriaxone Susceptible      Gentamicin Susceptible      Levofloxacin Susceptible      Nitrofurantoin Susceptible      Piperacillin + Tazobactam Susceptible      Tetracycline Susceptible      Trimethoprim + Sulfamethoxazole Susceptible               Linear View                         XR Humerus Left    Result Date: 8/18/2021  Impression: 1. Negative for fracture or dislocation. 2. Advanced glenohumeral osteoarthritis.  Electronically Signed By-Nehemiah Brink MD On:8/18/2021 10:14 AM This report was finalized on [id number] by  Nehemiah Brink MD.    XR Foot 3+ View Left    Result Date: 8/18/2021  Impression: Left foot degenerative changes with osteopenia. No acute left foot findings.  Electronically Signed By-Winter Jeronimo MD On:8/18/2021 10:09 AM This report was finalized on [id number] by  Winter Jeronimo MD.    CT Head Without Contrast    Result Date: 8/19/2021  Impression:  1. No acute intracranial finding. 2. Features of mild chronic microvascular disease.  Electronically Signed By-Winter Jeronimo MD On:8/19/2021 1:37 PM This report was finalized on [id number] by  Winter Jeronimo MD.    MRI Brain Without Contrast    Result Date: 8/19/2021  Impression:  1. No acute findings. 2. Volume loss secondary to cerebral atrophy. 3. Chronic ischemic changes. 4. 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   Electronically Signed By-Hugo Smith MD On:8/19/2021 4:18 PM This report was finalized on [id number] by  Hugo Smith MD.      Results for orders placed during the hospital encounter of 08/18/21    Duplex Venous Upper Extremity - Right CAR    Interpretation Summary  · Normal right upper  extremity venous duplex scan.      Results for orders placed during the hospital encounter of 08/18/21    Duplex Venous Upper Extremity - Right CAR    Interpretation Summary  · Normal right upper extremity venous duplex scan.          Labs Pending at Discharge:  Pending Labs     Order Current Status    Blood Culture - Blood, Hand, Left Preliminary result    Blood Culture - Blood, Hand, Left Preliminary result          Procedures Performed           Consults:   Consults     Date and Time Order Name Status Description    8/19/2021  9:04 AM Inpatient Neurology Consult General Completed     8/18/2021 11:18 AM Hospitalist (on-call MD unless specified) Completed             Discharge Details        Discharge Medications      New Medications      Instructions Start Date   cephalexin 500 MG capsule  Commonly known as: Keflex   500 mg, Oral, 2 Times Daily         Continue These Medications      Instructions Start Date   acetaminophen 325 MG tablet  Commonly known as: TYLENOL   650 mg, Oral, 2 Times Daily PRN      ARIPiprazole 2 MG tablet  Commonly known as: ABILIFY   2 mg, Oral, Nightly      Artificial Tears 1.4 % ophthalmic solution  Generic drug: polyvinyl alcohol   1 drop, Both Eyes, Every 4 Hours PRN      atorvastatin 80 MG tablet  Commonly known as: LIPITOR   80 mg, Oral, Nightly      benzonatate 100 MG capsule  Commonly known as: TESSALON   100 mg, Oral, Every 8 Hours PRN      Biofreeze 4 % gel  Generic drug: Menthol (Topical Analgesic)   1 application, Apply externally, 3 Times Daily PRN, Apply to neck       Calcium 600+D3 600-800 MG-UNIT tablet  Generic drug: calcium carb-cholecalciferol   1 tablet, Oral, 2 times daily      divalproex 125 MG DR tablet  Commonly known as: DEPAKOTE   125 mg, Oral, 2 Times Daily      FLUoxetine 40 MG capsule  Commonly known as: PROzac   40 mg, Oral, Nightly      fluticasone 50 MCG/ACT nasal spray  Commonly known as: FLONASE   2 sprays, Each Nare, Daily PRN      furosemide 40 MG  tablet  Commonly known as: LASIX   40 mg, Oral, Daily      HYDROcodone-acetaminophen 5-325 MG per tablet  Commonly known as: NORCO   1 tablet, Oral, 3 Times Daily      Loperamide A-D 2 MG tablet  Generic drug: loperamide   2 mg, Oral, Every 6 Hours PRN      metOLazone 2.5 MG tablet  Commonly known as: ZAROXOLYN   2.5 mg, Oral, Daily      metOLazone 5 MG tablet  Commonly known as: ZAROXOLYN   5 mg, Oral, Daily      Mirabegron ER 50 MG tablet sustained-release 24 hour 24 hr tablet  Commonly known as: MYRBETRIQ   50 mg, Oral, Daily      omeprazole 20 MG capsule  Commonly known as: priLOSEC   TAKE ONE CAPSULE BY MOUTH EVERY DAY      ondansetron 4 MG tablet  Commonly known as: ZOFRAN   4 mg, Oral, Every 6 Hours PRN      polyethylene glycol 17 GM/SCOOP powder  Commonly known as: MIRALAX   15 g, Oral, Daily PRN      potassium chloride 10 MEQ CR tablet  Commonly known as: K-DUR,KLOR-CON   10 mEq, Oral, 2 Times Daily      topiramate 50 MG tablet  Commonly known as: TOPAMAX   150 mg, Oral, Every Night at Bedtime      traZODone 50 MG tablet  Commonly known as: DESYREL   25-50 mg, Oral, Nightly      Zetia 10 MG tablet  Generic drug: ezetimibe   10 mg, Oral, Daily             Allergies   Allergen Reactions   • Morphine Other (See Comments)     Mood changes   • Nsaids    • Sulfa Antibiotics    • Vancomycin Rash         Discharge Disposition:  Home or Self Care    Diet:  Hospital:  Diet Order   Procedures   • Diet Regular         Discharge Activity:         CODE STATUS:  Code Status and Medical Interventions:   Ordered at: 08/18/21 1219     Limited Support to NOT Include:    Intubation    Cardioversion/Defibrillation     Level Of Support Discussed With:    Patient     Code Status:    No CPR     Medical Interventions (Level of Support Prior to Arrest):    Limited         No future appointments.    Additional Instructions for the Follow-ups that You Need to Schedule     Ambulatory Referral to Home Health   As directed      Face to  Face Visit Date: 8/19/2021    Follow-up provider for Plan of Care?: I treated the patient in an acute care facility and will not continue treatment after discharge.    Follow-up provider: SHEA NAIK [[id number]]    Reason/Clinical Findings: post hospital evaluation    Describe mobility limitations that make leaving home difficult: weakness    Nursing/Therapeutic Services Requested: Other (ESTEPHANIA order)    Frequency: 1 Week 1         Discharge Follow-up with PCP   As directed       Currently Documented PCP:    Shea Naik    PCP Phone Number:    [phone]     Follow Up Details: one week time.         Discharge Follow-up with Specified Provider: follow up with Family physician in two week time.; 2 Weeks   As directed      To: follow up with Family physician in two week time.    Follow Up: 2 Weeks               Time spent on Discharge including face to face service 33 minutes    This patient has been examined wearing appropriate Personal Protective Equipment and discussed with hospital infection control department. 08/23/21      Signature:

## 2021-08-23 NOTE — THERAPY TREATMENT NOTE
"Subjective: Pt agreeable to therapeutic plan of care.     Objective:     Bed mobility - Min-A  Transfers - Min-A and with rolling walker  Ambulation - Distance ambulation not attempted this date as pt extremely fearful of falling with minor post lean, which she is able to correct with both tactile and verbal cueing.     Pain: does not rate, but reports pain in L Arm and Leg, also some neck pain .     Education: Provided education on importance of mobility and skilled verbal / tactile cueing throughout intervention.     Assessment: Sarah Zendejas presents with functional mobility impairments which indicate the need for skilled intervention. Tolerating session today without incident. With encouragement, Sarah is able to perform bed mobility, sit<>stand x 2 and short distance transfer to bedside chair. Once in chair, pt is able to change gown with set up from therapist. Reports fatigue with minimal activity and carries significant self doubt and fear of falling. Constantly states \"I can't\" when she is actively performing the task that is requested of her. Will continue to follow and progress as tolerated. Continuing to recommend IP Rehab as Independent living would not be able to accommodate her current needs.     Plan/Recommendations:   Pt would benefit from Inpatient Rehabilitation placement at discharge from facility and requires no DME at discharge.   Pt desires Inpatient Rehabilitation placement at discharge. Pt cooperative; agreeable to therapeutic recommendations and plan of care.     Basic Mobility 6-click:  Rollin = Total, A lot = 2, A little = 3; 4 = None  Supine>Sit:   1 = Total, A lot = 2, A little = 3; 4 = None   Sit>Stand with arms:  1 = Total, A lot = 2, A little = 3; 4 = None  Bed>Chair:   1 = Total, A lot = 2, A little = 3; 4 = None  Ambulate in room:  1 = Total, A lot = 2, A little = 3; 4 = None  3-5 Steps with railin = Total, A lot = 2, A little = 3; 4 = None  Score: 15    Modified " Deejay: N/A = No pre-op stroke/TIA    Post-Tx Position: Up in Chair, Alarms activated and Call light and personal items within reach  PPE: gloves, surgical mask, eyewear protection

## 2021-08-24 ENCOUNTER — APPOINTMENT (OUTPATIENT)
Dept: GENERAL RADIOLOGY | Facility: HOSPITAL | Age: 86
End: 2021-08-24

## 2021-08-24 VITALS
HEART RATE: 61 BPM | BODY MASS INDEX: 27.1 KG/M2 | DIASTOLIC BLOOD PRESSURE: 66 MMHG | OXYGEN SATURATION: 99 % | HEIGHT: 62 IN | TEMPERATURE: 98 F | RESPIRATION RATE: 16 BRPM | WEIGHT: 147.27 LBS | SYSTOLIC BLOOD PRESSURE: 163 MMHG

## 2021-08-24 LAB
MAGNESIUM SERPL-MCNC: 2.8 MG/DL (ref 1.6–2.4)
POTASSIUM SERPL-SCNC: 3.3 MMOL/L (ref 3.5–5.2)
POTASSIUM SERPL-SCNC: 5.1 MMOL/L (ref 3.5–5.2)
SARS-COV-2 RNA PNL SPEC NAA+PROBE: NOT DETECTED

## 2021-08-24 PROCEDURE — 96372 THER/PROPH/DIAG INJ SC/IM: CPT

## 2021-08-24 PROCEDURE — 25010000002 HEPARIN (PORCINE) PER 1000 UNITS: Performed by: NURSE PRACTITIONER

## 2021-08-24 PROCEDURE — 71045 X-RAY EXAM CHEST 1 VIEW: CPT

## 2021-08-24 PROCEDURE — 84132 ASSAY OF SERUM POTASSIUM: CPT | Performed by: NURSE PRACTITIONER

## 2021-08-24 PROCEDURE — 83735 ASSAY OF MAGNESIUM: CPT | Performed by: NURSE PRACTITIONER

## 2021-08-24 PROCEDURE — 84132 ASSAY OF SERUM POTASSIUM: CPT | Performed by: HOSPITALIST

## 2021-08-24 PROCEDURE — 99217 PR OBSERVATION CARE DISCHARGE MANAGEMENT: CPT | Performed by: HOSPITALIST

## 2021-08-24 PROCEDURE — 87635 SARS-COV-2 COVID-19 AMP PRB: CPT | Performed by: HOSPITALIST

## 2021-08-24 PROCEDURE — G0378 HOSPITAL OBSERVATION PER HR: HCPCS

## 2021-08-24 RX ADMIN — Medication 10 ML: at 08:38

## 2021-08-24 RX ADMIN — PANTOPRAZOLE SODIUM 40 MG: 40 TABLET, DELAYED RELEASE ORAL at 05:04

## 2021-08-24 RX ADMIN — HEPARIN SODIUM 5000 UNITS: 5000 INJECTION INTRAVENOUS; SUBCUTANEOUS at 05:04

## 2021-08-24 RX ADMIN — METOLAZONE 5 MG: 5 TABLET ORAL at 08:26

## 2021-08-24 RX ADMIN — HYDROCODONE BITARTRATE AND ACETAMINOPHEN 1 TABLET: 5; 325 TABLET ORAL at 08:26

## 2021-08-24 RX ADMIN — OXYBUTYNIN CHLORIDE 10 MG: 10 TABLET, EXTENDED RELEASE ORAL at 08:26

## 2021-08-24 RX ADMIN — DIVALPROEX SODIUM 125 MG: 125 TABLET, DELAYED RELEASE ORAL at 08:26

## 2021-08-24 RX ADMIN — POTASSIUM CHLORIDE 40 MEQ: 1500 TABLET, EXTENDED RELEASE ORAL at 05:04

## 2021-08-24 RX ADMIN — POTASSIUM CHLORIDE 40 MEQ: 1500 TABLET, EXTENDED RELEASE ORAL at 09:44

## 2021-08-24 NOTE — DISCHARGE SUMMARY
[facility]  DISCHARGE SUMMARY    Patient Name: Sarah Zendejas  : 1931  MRN: [id number]    Date of Admission: 2021  Date of Discharge:  2021  Primary Care Physician: Victoria Naik      Presenting Problem:   Hypokalemia [E87.6]  Hypomagnesemia [E83.42]  Acute cystitis with hematuria [N30.01]  Acute encephalopathy [G93.40]  Acute renal impairment [N28.9]    Active and Resolved Hospital Problems:  Active Hospital Problems    Diagnosis POA   • **Acute encephalopathy [G93.40] Yes   • Acute cystitis with hematuria [N30.01] Yes   • Left arm pain [M79.602] Yes   • Left foot pain [M79.672] Yes   • Hypokalemia [E87.6] Yes   • Hypomagnesemia [E83.42] Yes   • Cellulitis [L03.90] Yes   • Right arm cellulitis [L03.113] Yes   • Acute UTI (urinary tract infection) [N39.0] Yes   • OAB (overactive bladder) [N32.81] Yes   • Anxiety state [F41.1] Yes   • Benign essential HTN [I10] Yes   • Bipolar 1 disorder (CMS/HCC) [F31.9] Yes   • Hyperlipemia [E78.5] Yes   • Gastric reflux [K21.9] Yes   • Depressive disorder [F32.9] Yes   • CKD (chronic kidney disease), stage III (CMS/HCC) [N18.30] Yes      Resolved Hospital Problems   No resolved problems to display.         Hospital Course     Hospital Course by problem list.    89-year-old female admitted after she was found somewhat confused, patient now back to her normal, patient alert and oriented x3.  Patient was recognized to have cellulitis, treated with IV Rocephin, now almost resolved, changed to oral Keflex for few more days on discharge.  Realizing advanced age and some physical deconditioning, PT OT saw the patient, now rehab has been arranged with the patient will be discharged to in a stable condition.    Condition on discharge stable.    Greater than 30 minutes in arranging for the discharge        •      •      •      •      •      •      •      •      •      •      •      •      •      •      •      •                DISCHARGE Follow Up with PCP in a week time.    Reasons For Change In Medications and Indications for New Medications:      Day of Discharge     Vital Signs:  Temp:  [97.6 °F (36.4 °C)-98 °F (36.7 °C)] 98 °F (36.7 °C)  Heart Rate:  [55-80] 61  Resp:  [16-18] 16  BP: (109-163)/(52-70) 163/66    Physical Exam:  Physical Exam  Vitals and nursing note reviewed.   Constitutional:       General: She is not in acute distress.     Appearance: Normal appearance. She is well-developed. She is not ill-appearing, toxic-appearing or diaphoretic.   HENT:      Head: Normocephalic and atraumatic.      Right Ear: Ear canal and external ear normal.      Left Ear: Ear canal and external ear normal.      Nose: Nose normal. No congestion or rhinorrhea.      Mouth/Throat:      Mouth: Mucous membranes are moist.      Pharynx: No oropharyngeal exudate.   Eyes:      General: No scleral icterus.        Right eye: No discharge.         Left eye: No discharge.      Extraocular Movements: Extraocular movements intact.      Conjunctiva/sclera: Conjunctivae normal.      Pupils: Pupils are equal, round, and reactive to light.   Neck:      Thyroid: No thyromegaly.      Vascular: No carotid bruit or JVD.      Trachea: No tracheal deviation.   Cardiovascular:      Rate and Rhythm: Normal rate and regular rhythm.      Pulses: Normal pulses.      Heart sounds: Normal heart sounds. No murmur heard.   No friction rub. No gallop.    Pulmonary:      Effort: Pulmonary effort is normal. No respiratory distress.      Breath sounds: Normal breath sounds. No stridor. No wheezing, rhonchi or rales.   Chest:      Chest wall: No tenderness.   Abdominal:      General: Bowel sounds are normal. There is no distension.      Palpations: Abdomen is soft. There is no mass.      Tenderness: There is no abdominal tenderness. There is no guarding or rebound.      Hernia: No hernia is present.   Musculoskeletal:         General: No swelling, tenderness, deformity  or signs of injury. Normal range of motion.      Cervical back: Normal range of motion and neck supple. No rigidity. No muscular tenderness.      Right lower leg: No edema.      Left lower leg: No edema.   Lymphadenopathy:      Cervical: No cervical adenopathy.   Skin:     General: Skin is warm and dry.      Coloration: Skin is not jaundiced or pale.      Findings: No bruising, erythema or rash.   Neurological:      General: No focal deficit present.      Mental Status: She is alert and oriented to person, place, and time. Mental status is at baseline.      Cranial Nerves: No cranial nerve deficit.      Sensory: No sensory deficit.      Motor: No weakness or abnormal muscle tone.      Coordination: Coordination normal.   Psychiatric:         Mood and Affect: Mood normal.         Behavior: Behavior normal.         Thought Content: Thought content normal.         Judgment: Judgment normal.            Pertinent  and/or Most Recent Results     LAB RESULTS:      Lab 08/22/21  0355 08/19/21  0212 08/18/21  1345 08/18/21  1021 08/18/21  1019   WBC 7.50 12.00*  --   --  10.40   HEMOGLOBIN 11.9* 11.4*  --   --  12.4   HEMATOCRIT 35.2 34.3  --   --  36.6   PLATELETS 361 248  --   --  248   NEUTROS ABS 4.20  --   --   --  8.20*   LYMPHS ABS 1.90  --   --   --  0.80   MONOS ABS 1.00*  --   --   --  1.40*   EOS ABS 0.40  --   --   --  0.00   MCV 92.0 93.1  --   --  93.6   SED RATE  --   --   --   --  48*   CRP  --   --   --   --  6.18*   LACTATE  --   --  0.8 0.7  --          Lab 08/24/21  0318 08/23/21  1956 08/23/21  0427 08/22/21  0355 08/21/21  0425 08/20/21  0416 08/19/21  0212 08/18/21  1345 08/18/21  1019 08/18/21  1019   SODIUM  --   --   --  135*  --   --  135*  --   --  137   POTASSIUM 3.3* 3.7 3.6 3.3* 3.7   < > 2.9*  --    < > 2.4*   CHLORIDE  --   --   --  100  --   --  99  --   --  95*   CO2  --   --   --  23.0  --   --  25.0  --   --  28.0   ANION GAP  --   --   --  12.0  --   --  11.0  --   --  14.0   BUN  --    --   --  24*  --   --  19  --   --  27*   CREATININE  --   --   --  0.90  --   --  0.79  --   --  1.03*   GLUCOSE  --   --   --  95  --   --  106*  --   --  99   CALCIUM  --   --   --  8.2*  --   --  8.4*  --   --  8.8   MAGNESIUM 2.8* 1.6 1.6 1.7 1.9   < > 1.7  --    < > 1.5*   HEMOGLOBIN A1C  --   --   --   --   --   --   --  5.8*  --   --     < > = values in this interval not displayed.         Lab 08/18/21  1019   TOTAL PROTEIN 7.0   ALBUMIN 3.50   GLOBULIN 3.5   ALT (SGPT) 8   AST (SGOT) 21   BILIRUBIN 0.6   ALK PHOS 101                     Brief Urine Lab Results  (Last result in the past 365 days)      Color   Clarity   Blood   Leuk Est   Nitrite   Protein   CREAT   Urine HCG        08/18/21 1033 Yellow Cloudy Trace Large (3+) Positive Negative             Microbiology Results (last 10 days)     Procedure Component Value - Date/Time    Blood Culture - Blood, Hand, Left [[id number]] Collected: 08/18/21 1345    Lab Status: Final result Specimen: Blood from Hand, Left Updated: 08/23/21 1415     Blood Culture No growth at 5 days    Blood Culture - Blood, Hand, Left [[id number]] Collected: 08/18/21 1343    Lab Status: Final result Specimen: Blood from Hand, Left Updated: 08/23/21 1415     Blood Culture No growth at 5 days    COVID-19,CEPHEID/JORDYN/BDMAX,COR/JAYY/PAD/HAYLIE IN-HOUSE(OR EMERGENT/ADD-ON),NP SWAB IN TRANSPORT MEDIA 3-4 HR TAT, RT-PCR - Swab, Nasopharynx [[id number]]  (Normal) Collected: 08/18/21 1230    Lab Status: Final result Specimen: Swab from Nasopharynx Updated: 08/18/21 1252     COVID19 Not Detected    Narrative:      Fact sheet for providers: https://www.fda.gov/media/074515/download     Fact sheet for patients: https://www.fda.gov/media/380053/download  Fact sheet for providers: https://www.fda.gov/media/211981/download    Fact sheet for patients: https://www.fda.gov/media/668739/download    Test performed by PCR.    Urine Culture - Urine, Urine, Catheter [[id number]]  (Abnormal)  (Susceptibility)  Collected: 08/18/21 1033    Lab Status: Final result Specimen: Urine, Catheter Updated: 08/20/21 0953     Urine Culture >100,000 CFU/mL Escherichia coli    Susceptibility      Escherichia coli      TRUE      Ampicillin Susceptible      Ampicillin + Sulbactam Susceptible      Cefazolin Susceptible      Cefepime Susceptible      Ceftazidime Susceptible      Ceftriaxone Susceptible      Gentamicin Susceptible      Levofloxacin Susceptible      Nitrofurantoin Susceptible      Piperacillin + Tazobactam Susceptible      Tetracycline Susceptible      Trimethoprim + Sulfamethoxazole Susceptible               Linear View                         XR Humerus Left    Result Date: 8/18/2021  Impression: 1. Negative for fracture or dislocation. 2. Advanced glenohumeral osteoarthritis.  Electronically Signed By-Nehemiah Brink MD On:8/18/2021 10:14 AM This report was finalized on [id number] by  Nehemiah Brink MD.    XR Foot 3+ View Left    Result Date: 8/18/2021  Impression: Left foot degenerative changes with osteopenia. No acute left foot findings.  Electronically Signed By-Winter Jeronimo MD On:8/18/2021 10:09 AM This report was finalized on [id number] by  Winter Jeronimo MD.    CT Head Without Contrast    Result Date: 8/19/2021  Impression:  1. No acute intracranial finding. 2. Features of mild chronic microvascular disease.  Electronically Signed By-Winter Jeronimo MD On:8/19/2021 1:37 PM This report was finalized on [id number] by  Winter Jeronimo MD.    MRI Brain Without Contrast    Result Date: 8/19/2021  Impression:  1. No acute findings. 2. Volume loss secondary to cerebral atrophy. 3. Chronic ischemic changes. 4. 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   Electronically Signed By-Hugo Smith MD On:8/19/2021 4:18 PM This report was finalized on [id number] by  Hugo Smith MD.    XR Chest 1 View    Result Date: 8/24/2021  Impression: No active disease.  Electronically Signed By-Hugo Smith MD On:8/24/2021  10:02 AM This report was finalized on [id number] by  Hugo Smith MD.      Results for orders placed during the hospital encounter of 08/18/21    Duplex Venous Upper Extremity - Right CAR    Interpretation Summary  · Normal right upper extremity venous duplex scan.      Results for orders placed during the hospital encounter of 08/18/21    Duplex Venous Upper Extremity - Right CAR    Interpretation Summary  · Normal right upper extremity venous duplex scan.          Labs Pending at Discharge:  Pending Labs     Order Current Status    COVID PRE-OP / PRE-PROCEDURE SCREENING ORDER (NO ISOLATION) - Swab, Nasopharynx In process    COVID-19,APTIMA PANTHER(JEAN MARIE),[facility], NP/OP SWAB IN UTM/VTM/[facility] TRANSPORT MEDIA,24 HR TAT - Swab, Nasopharynx In process          Procedures Performed           Consults:   Consults     Date and Time Order Name Status Description    8/19/2021  9:04 AM Inpatient Neurology Consult General Completed     8/18/2021 11:18 AM Hospitalist (on-call MD unless specified) Completed             Discharge Details        Discharge Medications      New Medications      Instructions Start Date   cephalexin 500 MG capsule  Commonly known as: Keflex   500 mg, Oral, 2 Times Daily         Continue These Medications      Instructions Start Date   acetaminophen 325 MG tablet  Commonly known as: TYLENOL   650 mg, Oral, 2 Times Daily PRN      ARIPiprazole 2 MG tablet  Commonly known as: ABILIFY   2 mg, Oral, Nightly      Artificial Tears 1.4 % ophthalmic solution  Generic drug: polyvinyl alcohol   1 drop, Both Eyes, Every 4 Hours PRN      atorvastatin 80 MG tablet  Commonly known as: LIPITOR   80 mg, Oral, Nightly      benzonatate 100 MG capsule  Commonly known as: TESSALON   100 mg, Oral, Every 8 Hours PRN      Biofreeze 4 % gel  Generic drug: Menthol (Topical Analgesic)   1 application, Apply externally, 3 Times Daily PRN, Apply to neck       Calcium 600+D3 600-800 MG-UNIT tablet  Generic drug: calcium  carb-cholecalciferol   1 tablet, Oral, 2 times daily      divalproex 125 MG DR tablet  Commonly known as: DEPAKOTE   125 mg, Oral, 2 Times Daily      FLUoxetine 40 MG capsule  Commonly known as: PROzac   40 mg, Oral, Nightly      fluticasone 50 MCG/ACT nasal spray  Commonly known as: FLONASE   2 sprays, Each Nare, Daily PRN      furosemide 40 MG tablet  Commonly known as: LASIX   40 mg, Oral, Daily      HYDROcodone-acetaminophen 5-325 MG per tablet  Commonly known as: NORCO   1 tablet, Oral, 3 Times Daily      Loperamide A-D 2 MG tablet  Generic drug: loperamide   2 mg, Oral, Every 6 Hours PRN      metOLazone 2.5 MG tablet  Commonly known as: ZAROXOLYN   2.5 mg, Oral, Daily      metOLazone 5 MG tablet  Commonly known as: ZAROXOLYN   5 mg, Oral, Daily      Mirabegron ER 50 MG tablet sustained-release 24 hour 24 hr tablet  Commonly known as: MYRBETRIQ   50 mg, Oral, Daily      omeprazole 20 MG capsule  Commonly known as: priLOSEC   TAKE ONE CAPSULE BY MOUTH EVERY DAY      ondansetron 4 MG tablet  Commonly known as: ZOFRAN   4 mg, Oral, Every 6 Hours PRN      polyethylene glycol 17 GM/SCOOP powder  Commonly known as: MIRALAX   15 g, Oral, Daily PRN      potassium chloride 10 MEQ CR tablet  Commonly known as: K-DUR,KLOR-CON   10 mEq, Oral, 2 Times Daily      topiramate 50 MG tablet  Commonly known as: TOPAMAX   150 mg, Oral, Every Night at Bedtime      traZODone 50 MG tablet  Commonly known as: DESYREL   25-50 mg, Oral, Nightly      Zetia 10 MG tablet  Generic drug: ezetimibe   10 mg, Oral, Daily             Allergies   Allergen Reactions   • Morphine Other (See Comments)     Mood changes   • Nsaids    • Sulfa Antibiotics    • Vancomycin Rash         Discharge Disposition:  Rehab Facility or Unit (DC - External)    Diet:  Hospital:  Diet Order   Procedures   • Diet Regular         Discharge Activity:         CODE STATUS:  Code Status and Medical Interventions:   Ordered at: 08/18/21 1200     Limited Support to NOT  Include:    Intubation    Cardioversion/Defibrillation     Level Of Support Discussed With:    Patient     Code Status:    No CPR     Medical Interventions (Level of Support Prior to Arrest):    Limited         No future appointments.    Additional Instructions for the Follow-ups that You Need to Schedule     Ambulatory Referral to Home Health   As directed      Face to Face Visit Date: 8/19/2021    Follow-up provider for Plan of Care?: I treated the patient in an acute care facility and will not continue treatment after discharge.    Follow-up provider: SHEA NAIK [[id number]]    Reason/Clinical Findings: post hospital evaluation    Describe mobility limitations that make leaving home difficult: weakness    Nursing/Therapeutic Services Requested: Other (ESTEPHANIA order)    Frequency: 1 Week 1         Discharge Follow-up with PCP   As directed       Currently Documented PCP:    Shea Naik    PCP Phone Number:    [phone]     Follow Up Details: one week time.         Discharge Follow-up with Specified Provider: follow up with Family physician in two week time.; 2 Weeks   As directed      To: follow up with Family physician in two week time.    Follow Up: 2 Weeks               Time spent on Discharge including face to face service 33 minutes    This patient has been examined wearing appropriate Personal Protective Equipment and discussed with hospital infection control department. 08/24/21      Signature:

## 2021-08-24 NOTE — PLAN OF CARE
Goal Outcome Evaluation:              Outcome Summary: Pt continues fall precautions. New IV placed for magnessium replacement. No issues voiced. Will continue to monitor.

## 2021-09-09 ENCOUNTER — HOSPITAL ENCOUNTER (OUTPATIENT)
Facility: HOSPITAL | Age: 86
Setting detail: OBSERVATION
Discharge: NURSING, REHAB OR LONG-TERM CARE | End: 2021-09-10
Attending: HOSPITALIST | Admitting: HOSPITALIST

## 2021-09-09 ENCOUNTER — APPOINTMENT (OUTPATIENT)
Dept: GENERAL RADIOLOGY | Facility: HOSPITAL | Age: 86
End: 2021-09-09

## 2021-09-09 DIAGNOSIS — M79.671 RIGHT FOOT PAIN: ICD-10-CM

## 2021-09-09 DIAGNOSIS — Z74.09 IMMOBILITY: Primary | ICD-10-CM

## 2021-09-09 LAB
ANION GAP SERPL CALCULATED.3IONS-SCNC: 13 MMOL/L (ref 5–15)
BACTERIA UR QL AUTO: ABNORMAL /HPF
BASOPHILS # BLD AUTO: 0 10*3/MM3 (ref 0–0.2)
BASOPHILS NFR BLD AUTO: 0.5 % (ref 0–1.5)
BILIRUB UR QL STRIP: NEGATIVE
BUN SERPL-MCNC: 23 MG/DL (ref 8–23)
BUN/CREAT SERPL: 26.1 (ref 7–25)
CALCIUM SPEC-SCNC: 8.3 MG/DL (ref 8.6–10.5)
CHLORIDE SERPL-SCNC: 95 MMOL/L (ref 98–107)
CLARITY UR: CLEAR
CO2 SERPL-SCNC: 26 MMOL/L (ref 22–29)
COLOR UR: YELLOW
CREAT SERPL-MCNC: 0.88 MG/DL (ref 0.57–1)
DEPRECATED RDW RBC AUTO: 48.6 FL (ref 37–54)
EOSINOPHIL # BLD AUTO: 0 10*3/MM3 (ref 0–0.4)
EOSINOPHIL NFR BLD AUTO: 0.3 % (ref 0.3–6.2)
ERYTHROCYTE [DISTWIDTH] IN BLOOD BY AUTOMATED COUNT: 15.2 % (ref 12.3–15.4)
GFR SERPL CREATININE-BSD FRML MDRD: 61 ML/MIN/1.73
GLUCOSE SERPL-MCNC: 96 MG/DL (ref 65–99)
GLUCOSE UR STRIP-MCNC: NEGATIVE MG/DL
HCT VFR BLD AUTO: 31 % (ref 34–46.6)
HGB BLD-MCNC: 10.9 G/DL (ref 12–15.9)
HGB UR QL STRIP.AUTO: NEGATIVE
HYALINE CASTS UR QL AUTO: ABNORMAL /LPF
KETONES UR QL STRIP: NEGATIVE
LEUKOCYTE ESTERASE UR QL STRIP.AUTO: ABNORMAL
LYMPHOCYTES # BLD AUTO: 1.6 10*3/MM3 (ref 0.7–3.1)
LYMPHOCYTES NFR BLD AUTO: 17.3 % (ref 19.6–45.3)
MAGNESIUM SERPL-MCNC: 1.8 MG/DL (ref 1.6–2.4)
MCH RBC QN AUTO: 32.9 PG (ref 26.6–33)
MCHC RBC AUTO-ENTMCNC: 35.1 G/DL (ref 31.5–35.7)
MCV RBC AUTO: 93.8 FL (ref 79–97)
MONOCYTES # BLD AUTO: 1.6 10*3/MM3 (ref 0.1–0.9)
MONOCYTES NFR BLD AUTO: 17.3 % (ref 5–12)
NEUTROPHILS NFR BLD AUTO: 5.8 10*3/MM3 (ref 1.7–7)
NEUTROPHILS NFR BLD AUTO: 64.6 % (ref 42.7–76)
NITRITE UR QL STRIP: NEGATIVE
NRBC BLD AUTO-RTO: 0.1 /100 WBC (ref 0–0.2)
PH UR STRIP.AUTO: 7 [PH] (ref 5–8)
PLATELET # BLD AUTO: 303 10*3/MM3 (ref 140–450)
PMV BLD AUTO: 8 FL (ref 6–12)
POTASSIUM SERPL-SCNC: 3.3 MMOL/L (ref 3.5–5.2)
PROT UR QL STRIP: NEGATIVE
RBC # BLD AUTO: 3.3 10*6/MM3 (ref 3.77–5.28)
RBC # UR: ABNORMAL /HPF
REF LAB TEST METHOD: ABNORMAL
SARS-COV-2 RNA PNL SPEC NAA+PROBE: NOT DETECTED
SODIUM SERPL-SCNC: 134 MMOL/L (ref 136–145)
SP GR UR STRIP: 1.02 (ref 1–1.03)
SQUAMOUS #/AREA URNS HPF: ABNORMAL /HPF
UROBILINOGEN UR QL STRIP: ABNORMAL
WBC # BLD AUTO: 9 10*3/MM3 (ref 3.4–10.8)
WBC UR QL AUTO: ABNORMAL /HPF

## 2021-09-09 PROCEDURE — 85025 COMPLETE CBC W/AUTO DIFF WBC: CPT | Performed by: NURSE PRACTITIONER

## 2021-09-09 PROCEDURE — 80048 BASIC METABOLIC PNL TOTAL CA: CPT | Performed by: NURSE PRACTITIONER

## 2021-09-09 PROCEDURE — G0378 HOSPITAL OBSERVATION PER HR: HCPCS

## 2021-09-09 PROCEDURE — 83735 ASSAY OF MAGNESIUM: CPT | Performed by: HOSPITALIST

## 2021-09-09 PROCEDURE — 73630 X-RAY EXAM OF FOOT: CPT

## 2021-09-09 PROCEDURE — 81001 URINALYSIS AUTO W/SCOPE: CPT | Performed by: NURSE PRACTITIONER

## 2021-09-09 PROCEDURE — 99219 PR INITIAL OBSERVATION CARE/DAY 50 MINUTES: CPT | Performed by: HOSPITALIST

## 2021-09-09 PROCEDURE — 25010000002 HEPARIN (PORCINE) PER 1000 UNITS: Performed by: HOSPITALIST

## 2021-09-09 PROCEDURE — C9803 HOPD COVID-19 SPEC COLLECT: HCPCS

## 2021-09-09 PROCEDURE — 87635 SARS-COV-2 COVID-19 AMP PRB: CPT | Performed by: HOSPITALIST

## 2021-09-09 PROCEDURE — 96372 THER/PROPH/DIAG INJ SC/IM: CPT

## 2021-09-09 PROCEDURE — 97165 OT EVAL LOW COMPLEX 30 MIN: CPT

## 2021-09-09 PROCEDURE — 96361 HYDRATE IV INFUSION ADD-ON: CPT

## 2021-09-09 PROCEDURE — 99284 EMERGENCY DEPT VISIT MOD MDM: CPT

## 2021-09-09 RX ORDER — SODIUM CHLORIDE 0.9 % (FLUSH) 0.9 %
3-10 SYRINGE (ML) INJECTION AS NEEDED
Status: DISCONTINUED | OUTPATIENT
Start: 2021-09-09 | End: 2021-09-10 | Stop reason: HOSPADM

## 2021-09-09 RX ORDER — SODIUM CHLORIDE 0.9 % (FLUSH) 0.9 %
3 SYRINGE (ML) INJECTION EVERY 12 HOURS SCHEDULED
Status: DISCONTINUED | OUTPATIENT
Start: 2021-09-09 | End: 2021-09-10 | Stop reason: HOSPADM

## 2021-09-09 RX ORDER — ONDANSETRON 2 MG/ML
4 INJECTION INTRAMUSCULAR; INTRAVENOUS EVERY 6 HOURS PRN
Status: DISCONTINUED | OUTPATIENT
Start: 2021-09-09 | End: 2021-09-10 | Stop reason: HOSPADM

## 2021-09-09 RX ORDER — ONDANSETRON 4 MG/1
4 TABLET, FILM COATED ORAL EVERY 6 HOURS PRN
Status: DISCONTINUED | OUTPATIENT
Start: 2021-09-09 | End: 2021-09-10 | Stop reason: HOSPADM

## 2021-09-09 RX ORDER — SODIUM CHLORIDE 0.9 % (FLUSH) 0.9 %
10 SYRINGE (ML) INJECTION AS NEEDED
Status: DISCONTINUED | OUTPATIENT
Start: 2021-09-09 | End: 2021-09-10 | Stop reason: HOSPADM

## 2021-09-09 RX ORDER — ACETAMINOPHEN 325 MG/1
650 TABLET ORAL EVERY 4 HOURS PRN
Status: DISCONTINUED | OUTPATIENT
Start: 2021-09-09 | End: 2021-09-10 | Stop reason: HOSPADM

## 2021-09-09 RX ORDER — CALCIUM CARBONATE 200(500)MG
1 TABLET,CHEWABLE ORAL 2 TIMES DAILY PRN
Status: DISCONTINUED | OUTPATIENT
Start: 2021-09-09 | End: 2021-09-10 | Stop reason: HOSPADM

## 2021-09-09 RX ORDER — SODIUM CHLORIDE 9 MG/ML
75 INJECTION, SOLUTION INTRAVENOUS CONTINUOUS
Status: DISCONTINUED | OUTPATIENT
Start: 2021-09-09 | End: 2021-09-10

## 2021-09-09 RX ORDER — HEPARIN SODIUM 5000 [USP'U]/ML
5000 INJECTION, SOLUTION INTRAVENOUS; SUBCUTANEOUS EVERY 8 HOURS SCHEDULED
Status: DISCONTINUED | OUTPATIENT
Start: 2021-09-09 | End: 2021-09-10 | Stop reason: HOSPADM

## 2021-09-09 RX ADMIN — HEPARIN SODIUM 5000 UNITS: 5000 INJECTION INTRAVENOUS; SUBCUTANEOUS at 23:06

## 2021-09-09 RX ADMIN — SODIUM CHLORIDE 75 ML/HR: 9 INJECTION, SOLUTION INTRAVENOUS at 20:51

## 2021-09-09 RX ADMIN — Medication 3 ML: at 23:07

## 2021-09-09 NOTE — THERAPY EVALUATION
Patient Name: Sarah Zendejas  : 1931    MRN: [id number]                              Today's Date: 2021       Admit Date: 2021    Visit Dx: No diagnosis found.  Patient Active Problem List   Diagnosis   • Fatty liver disease, nonalcoholic   • CKD (chronic kidney disease), stage III (CMS/HCC)   • Bipolar 1 disorder (CMS/HCC)   • Rhinitis, allergic   • Anxiety state   • Depressive disorder   • Hyperlipemia   • Benign essential HTN   • Abnormal glucose   • Bone/cartilage disorder   • Routine general medical examination at a health care facility   • Breast tenderness in female   • Screening for malignant neoplasm of cervix   • Encounter for routine gynecological examination   • Calculus of bile duct   • Gallstone   • Bladder spasm   • Weight loss   • Bursitis of knee   • Gastric reflux   • Constipated   • Cells and casts in urine   • OAB (overactive bladder)   • Left arm pain   • Left foot pain   • Hypokalemia   • Hypomagnesemia   • Cellulitis   • Right arm cellulitis   • Acute encephalopathy   • Acute UTI (urinary tract infection)   • Acute cystitis with hematuria     Past Medical History:   Diagnosis Date   • Allergic rhinitis    • Anemia    • Bipolar 1 disorder (CMS/HCC)    • Broken bones     cheek bone   • Bursitis of knee    • Calculus of bile duct    • Chronic kidney disease     STAGE 3   • Colon polyps    • Depression    • Dysuria    • Fundic gland polyposis of stomach 2014   • Gallstones 2014    gb sono-stone    • GERD (gastroesophageal reflux disease)    • H/O complete eye exam    • H/O mammogram    • History of dental examination 2016   • Hyperlipidemia    • Hypertension    • Infectious viral hepatitis    • Non-alcoholic fatty liver disease    • Polyp of stomach    • Spinal stenosis    • UTI (urinary tract infection)      Past Surgical History:   Procedure Laterality Date   • BILATERAL BREAST REDUCTION     • CARDIAC CATHETERIZATION     • COLON SURGERY  2014    TUBULAR  ADENOMA RESECTED   • COLONOSCOPY  2013   • ENDOSCOPY  08/2014    (Dr Huffman)   • ESOPHAGEAL DILATATION     • OTHER SURGICAL HISTORY  08/2014    bx stomach polyps and colon polyps     General Information     Row Name 09/09/21 1634          OT Time and Intention    Document Type  evaluation  -     Mode of Treatment  individual therapy  -     Row Name 09/09/21 1634          General Information    Patient Profile Reviewed  yes  -MP     Prior Level of Function  independent:;ADL's  -MP     Existing Precautions/Restrictions  fall  -     Row Name 09/09/21 1634          Living Environment    Lives With  alone  -     Row Name 09/09/21 1634          Cognition    Orientation Status (Cognition)  oriented x 3  -MP     Row Name 09/09/21 1634          Safety Issues, Functional Mobility    Impairments Affecting Function (Mobility)  balance;strength;postural/trunk control;endurance/activity tolerance  -       User Key  (r) = Recorded By, (t) = Taken By, (c) = Cosigned By    Initials Name Provider Type    MP Noble Sapp OT Occupational Therapist          Mobility/ADL's     Row Name 09/09/21 1634          Bed Mobility    Bed Mobility  bed mobility (all) activities  -     All Activities, [place] (Bed Mobility)  moderate assist (50% patient effort);1 person assist  -     Row Name 09/09/21 1634          Transfers    Transfers  sit-stand transfer  -     Sit-Stand [place] (Transfers)  moderate assist (50% patient effort);1 person assist  -     Row Name 09/09/21 1634          Activities of Daily Living    BADL Assessment/Intervention  lower body dressing  -     Row Name 09/09/21 1634          Lower Body Dressing Assessment/Training    [place] Level (Lower Body Dressing)  don;doff;socks;maximum assist (25% patient effort)  -       User Key  (r) = Recorded By, (t) = Taken By, (c) = Cosigned By    Initials Name Provider Type    Noble Palomares OT Occupational Therapist         Obj/Interventions     [facility] Name 09/09/21 1635          Range of Motion Comprehensive    Comment, General Range of Motion  L shoulder AROM impacted 25-50%  -MP     [facility] Name 09/09/21 1635          Strength Comprehensive (MMT)    Comment, General Manual Muscle Testing (MMT) Assessment  BUE 3+/5  -MP     [facility] Name 09/09/21 1635          Balance    Balance Interventions  sitting;standing;sit to stand;supported;static;dynamic  -MP       User Key  (r) = Recorded By, (t) = Taken By, (c) = Cosigned By    Initials Name Provider Type    Noble Palomares OT Occupational Therapist        Goals/Plan     Row Name 09/09/21 1646          Bed Mobility Goal 1 (OT)    Activity/Assistive Device (Bed Mobility Goal 1, OT)  bed mobility activities, all  -MP     [place] Level/Cues Needed (Bed Mobility Goal 1, OT)  contact guard assist  -MP     Time Frame (Bed Mobility Goal 1, OT)  long term goal (LTG);2 weeks  -MP     Row Name 09/09/21 1646          Transfer Goal 1 (OT)    Activity/Assistive Device (Transfer Goal 1, OT)  sit-to-stand/stand-to-sit;toilet  -MP     [place] Level/Cues Needed (Transfer Goal 1, OT)  minimum assist (75% or more patient effort)  -MP     Time Frame (Transfer Goal 1, OT)  long term goal (LTG);2 weeks  -MP     Row Name 09/09/21 1646          Dressing Goal 1 (OT)    Activity/Device (Dressing Goal 1, OT)  lower body dressing  -MP     [place]/Cues Needed (Dressing Goal 1, OT)  moderate assist (50-74% patient effort)  -MP     Time Frame (Dressing Goal 1, OT)  long term goal (LTG);2 weeks  -MP       User Key  (r) = Recorded By, (t) = Taken By, (c) = Cosigned By    Initials Name Provider Type    Noble Palomares OT Occupational Therapist        Clinical Impression     Row Name 09/09/21 1639          Pain Assessment    Additional Documentation  Pain Scale: FACES Pre/Post-Treatment (Group)  -MP     Row Name 09/09/21 1639          Pain Scale: FACES Pre/Post-Treatment    Pain: FACES Scale,  Pretreatment  2-->hurts little bit  -MP     Posttreatment Pain Rating  2-->hurts little bit  -MP     Pain Location - Side  Right  -MP     Pain Location - Orientation  lower  -MP     Pain Location  foot  -MP     Row Name 09/09/21 1639          Plan of Care Review    Plan of Care Reviewed With  patient  -MP     Progress  no change  -MP     Outcome Summary  Pt. is 89 y/o female admit from assisted living w/ AMS and R foot pain, XR (-) fracture. Pt. w/ recent hospital admit and subsequent rehab stay, states she is typically ADL independent and ambulates unassisted, although has had continues mobility deficits. Pt. completes functional tranfsers w/ mod A this date and increased ADL support max for donning/doffing socks seated at EOB. Pt. also w/ continued L shoulder AROM deficits from previous admit albeit improved AROM up to 70* flexion and 120* PROM. Pt. not safe to d/c home at this time and will require IP rehab at d/c to address aforementioned deficits. Will follow up w/ pt. 1-3x per week at [facility].  -MP     Row Name 09/09/21 1639          Therapy Assessment/Plan (OT)    Rehab Potential (OT)  good, to achieve stated therapy goals  -MP     Criteria for Skilled Therapeutic Interventions Met (OT)  yes  -MP     Therapy Frequency (OT)  3 times/wk  -MP     Row Name 09/09/21 1639          Therapy Plan Review/Discharge Plan (OT)    Anticipated Discharge Disposition (OT)  inpatient rehabilitation facility  -MP     Row Name 09/09/21 1639          Vital Signs    Pre Patient Position  Supine  -MP     Intra Patient Position  Standing  -MP     Post Patient Position  Supine  -MP     Row Name 09/09/21 1639          Positioning and Restraints    Pre-Treatment Position  in bed  -MP     Post Treatment Position  bed  -MP     In Bed  supine;call light within reach;encouraged to call for assist;exit alarm on  -MP       User Key  (r) = Recorded By, (t) = Taken By, (c) = Cosigned By    Initials Name Provider Type    Noble Palomares, OT  Occupational Therapist        Outcome Measures    No documentation.         Occupational Therapy Education                 Title: PT OT SLP Therapies (In Progress)     Topic: Occupational Therapy (In Progress)     Point: ADL training (Not Started)     Description:   Instruct learner(s) on proper safety adaptation and remediation techniques during self care or transfers.   Instruct in proper use of assistive devices.              Learner Progress:  Not documented in this visit.          Point: Home exercise program (Not Started)     Description:   Instruct learner(s) on appropriate technique for monitoring, assisting and/or progressing therapeutic exercises/activities.              Learner Progress:  Not documented in this visit.          Point: Precautions (Not Started)     Description:   Instruct learner(s) on prescribed precautions during self-care and functional transfers.              Learner Progress:  Not documented in this visit.          Point: Body mechanics (Done)     Description:   Instruct learner(s) on proper positioning and spine alignment during self-care, functional mobility activities and/or exercises.              Learning Progress Summary           Patient Acceptance, E,TB, VU by MARY at 9/9/2021 8556                               User Key     Initials Effective Dates Name Provider Type Discipline    MARY 06/16/21 -  Noble Sapp OT Occupational Therapist OT              OT Recommendation and Plan  Therapy Frequency (OT): 3 times/wk  Plan of Care Review  Plan of Care Reviewed With: patient  Progress: no change  Outcome Summary: Pt. is 88 y/o female admit from assisted living w/ AMS and R foot pain, XR (-) fracture. Pt. w/ recent hospital admit and subsequent rehab stay, states she is typically ADL independent and ambulates unassisted, although has had continues mobility deficits. Pt. completes functional tranfsers w/ mod A this date and increased ADL support max for donning/doffing socks seated  at EOB. Pt. also w/ continued L shoulder AROM deficits from previous admit albeit improved AROM up to 70* flexion and 120* PROM. Pt. not safe to d/c home at this time and will require IP rehab at d/c to address aforementioned deficits. Will follow up w/ pt. 1-3x per week at [facility].     Time Calculation:   Time Calculation- OT     Row Name 09/09/21 1647             Time Calculation- OT    OT Start Time  1605  -MP      OT Stop Time  1622  -MP      OT Time Calculation (min)  17 min  -MP      Total Timed Code Minutes- OT  0 minute(s)  -MP      OT Received On  09/09/21  -MP      OT - Next Appointment  09/10/21  -      OT Goal Re-Cert Due Date  09/23/21  -        User Key  (r) = Recorded By, (t) = Taken By, (c) = Cosigned By    Initials Name Provider Type    MP Noble Sapp OT Occupational Therapist        Therapy Charges for Today     Code Description Service Date Service Provider Modifiers Qty    07981570901 HC OT EVAL LOW COMPLEXITY 3 9/9/2021 Noble Sapp OT GO 1               Noble Sapp OT  9/9/2021

## 2021-09-09 NOTE — ED PROVIDER NOTES
Subjective   Patient is an 89-year-old white female with history of hypertension, high cholesterol.  She presents by EMS today from [facility] living [facility].  She was sent to the ED today because the facility cannot take care of her.  It is an assisted living facility and she is requiring too much care.  Patient denies any specific complaints at this time although she does state that her right foot has been hurting for the last several days.  She is unsure of any injury or trauma but thinks someone may have stepped on it.  She otherwise denies any complaints.  In speaking with case management, they recommend consulting PT OT for evaluation prior to admitting or discharging the patient.          Review of Systems   Constitutional: Negative for fever.   HENT: Negative for congestion.    Respiratory: Negative for cough and shortness of breath.    Cardiovascular: Negative for chest pain.   Gastrointestinal: Negative for abdominal pain, diarrhea and vomiting.   Genitourinary: Negative for difficulty urinating and dysuria.   Skin: Negative for rash.   Neurological: Positive for weakness. Negative for dizziness, facial asymmetry, speech difficulty, light-headedness, numbness and headaches.       Past Medical History:   Diagnosis Date   • Allergic rhinitis    • Anemia    • Bipolar 1 disorder (CMS/HCC)    • Broken bones     cheek bone   • Bursitis of knee    • Calculus of bile duct    • Chronic kidney disease     STAGE 3   • Colon polyps    • Depression    • Dysuria    • Fundic gland polyposis of stomach 08/2014   • Gallstones 07/2014    gb sono-stone 7/14   • GERD (gastroesophageal reflux disease)    • H/O complete eye exam 2015   • H/O mammogram 2013   • History of dental examination 06/2016   • Hyperlipidemia    • Hypertension    • Infectious viral hepatitis    • Non-alcoholic fatty liver disease    • Polyp of stomach    • Spinal stenosis    • UTI (urinary tract infection)        Allergies   Allergen  Reactions   • Morphine Other (See Comments)     Mood changes   • Nsaids    • Sulfa Antibiotics    • Vancomycin Rash       Past Surgical History:   Procedure Laterality Date   • BILATERAL BREAST REDUCTION     • CARDIAC CATHETERIZATION     • COLON SURGERY  08/2014    TUBULAR ADENOMA RESECTED   • COLONOSCOPY  2013   • ENDOSCOPY  08/2014    (Dr Huffman)   • ESOPHAGEAL DILATATION     • OTHER SURGICAL HISTORY  08/2014    bx stomach polyps and colon polyps       Family History   Problem Relation Age of Onset   • Colon cancer Mother    • Cancer Mother    • Stroke Father    • Arthritis Father    • Hypertension Father    • Ovarian cancer Sister    • Heart disease Sister    • Cancer Sister    • Diabetes Brother    • Arthritis Brother    • Hypertension Brother    • Colon cancer Sister    • Cancer Sister    • Adrenal disorder Other    • Breast cancer Other    • Brain cancer Other        Social History     Socioeconomic History   • Marital status: Single     Spouse name: Not on file   • Number of children: Not on file   • Years of education: Not on file   • Highest education level: Not on file   Tobacco Use   • Smoking status: Former Smoker   • Smokeless tobacco: Never Used   Substance and Sexual Activity   • Alcohol use: No   • Drug use: No           Objective   Physical Exam  Vital signs and triage nurse note reviewed.  Constitutional: Awake, alert; well-developed and well-nourished. No acute distress is noted.  HEENT: Normocephalic, atraumatic; pupils are PERRL with intact EOM; oropharynx is pink and moist without exudate or erythema.  No drooling or pooling of oral secretions.  Neck: Supple, full range of motion without pain; no cervical lymphadenopathy. Normal phonation.  Cardiovascular: Regular rate and rhythm, normal S1-S2.  No murmur noted.  Pulmonary: Respiratory effort regular nonlabored, breath sounds clear to auscultation all fields.  Abdomen: Soft, nontender, nondistended with normoactive bowel sounds; no rebound  or guarding.  Musculoskeletal: Independent range of motion of all extremities with no palpable tenderness or edema.  Neuro: Alert oriented x3, speech is clear and appropriate, GCS 15.    Skin: Flesh tone, warm, dry, intact; no erythematous or petechial rash or lesion.      Procedures           ED Course      Labs Reviewed   BASIC METABOLIC PANEL - Abnormal; Notable for the following components:       Result Value    Sodium 134 (*)     Potassium 3.3 (*)     Chloride 95 (*)     Calcium 8.3 (*)     BUN/Creatinine Ratio 26.1 (*)     All other components within normal limits    Narrative:     GFR Normal >60  Chronic Kidney Disease <60  Kidney Failure <15     CBC WITH AUTO DIFFERENTIAL - Abnormal; Notable for the following components:    RBC 3.30 (*)     Hemoglobin 10.9 (*)     Hematocrit 31.0 (*)     Lymphocyte % 17.3 (*)     Monocyte % 17.3 (*)     Monocytes, Absolute 1.60 (*)     All other components within normal limits   URINALYSIS W/ MICROSCOPIC IF INDICATED (NO CULTURE)   CBC AND DIFFERENTIAL    Narrative:     The following orders were created for panel order CBC & Differential.  Procedure                               Abnormality         Status                     ---------                               -----------         ------                     CBC Auto Differential[[id number]]        Abnormal            Final result                 Please view results for these tests on the individual orders.     XR Foot 3+ View Right    Result Date: 9/9/2021   1. No acute right foot finding. 2. Advanced osteopenia. 3. Degenerative changes of the right foot and ankle as described.  Electronically Signed By-Winter Jeronimo MD On:9/9/2021 3:14 PM This report was finalized on [id number] by  Winter Jeronimo MD.    Medications   sodium chloride 0.9 % flush 10 mL (has no administration in time range)                                          MDM  Number of Diagnoses or Management Options  Immobility  Right foot pain  Diagnosis  management comments: Patient had x-rays obtained of the right foot that show no acute abnormality.  PT/OT was consulted and evaluated the patient here in the ED and feels that she is not appropriate to discharge back to [facility].  Case management was notified and confirms that there is a [facility] will not accept the patient back as patient is requiring too much care.  She then spoke with the POA who states that she cannot take the patient tonight at her home and that she will need at least a day to make arrangements.  At this point the patient will need admission to the hospital until arrangements can be made for rehab/long-term placement.    Diagnosis and treatment plan discussed with patient.  Patient agreeable to plan.   This was discussed with JOSELITO Petty.       Amount and/or Complexity of Data Reviewed  Tests in the radiology section of CPT®: ordered and reviewed    Patient Progress  Patient progress: stable      Final diagnoses:   Immobility   Right foot pain       ED Disposition  ED Disposition     ED Disposition Condition Comment    Decision to Admit  Level of Care: Telemetry [5]   Diagnosis: Immobility [980422]   Admitting Physician: ALCON ROSE [[id number]]   Attending Physician: ALCON ROSE [[id number]]            No follow-up provider specified.       Medication List      No changes were made to your prescriptions during this visit.          Dari Carter, CYN  09/09/21 2057

## 2021-09-09 NOTE — ED NOTES
"Pt c/o right foot pain and decreased mobility as a result of the right foot pain. Pt unsure of injury and states \"someone must have stepped on it\". Pt states she has been unable to perform mobility as needed for [facility] so they sent her in. Per paperwork, pt has AMS and right foot pain.     Tamica Kirby RN  09/09/21 [id number]    "

## 2021-09-09 NOTE — H&P
[facility]      Patient Name: Sarah Zendejas  : 1931  MRN: [id number]  Primary Care Physician:  Victoria Naik  Date of admission: 2021      Subjective      Chief Complaint: Foot pain    History of Present Illness: 89-year-old multiple medical problems recently hospitalized at our facility discharged to SNF.  She was transferred to another rehab facility for unclear to me reasons, either way patient was found to require more needs than that facility could offer and she was subsequently transferred to our ER.  She was in SNF due to deconditioning and does need a lot of PT and OT.  Her only complaint right now was right foot pain.  She has no swelling no erythema no fevers no chills associated with it.  There is been no trauma.  She can bear weight on it.    ER x-ray of right foot with no acute findings, labs pending  Review of systems  General no fevers no chills  Neuro no focal weakness no focal numbness  Cardiovascular no chest pain no palpitations  Pulmonary no shortness of breath no cough  Abdomen no pain no nausea no vomiting no blood per any orifice  Urinary no dysuria no polyuria positive for urinary incontinence  Muscleskeletal right foot pain, no muscle pain  Skin no rashes    Personal History     Past Medical History:   Diagnosis Date   • Allergic rhinitis    • Anemia    • Bipolar 1 disorder (CMS/HCC)    • Broken bones     cheek bone   • Bursitis of knee    • Calculus of bile duct    • Chronic kidney disease     STAGE 3   • Colon polyps    • Depression    • Dysuria    • Fundic gland polyposis of stomach 2014   • Gallstones 2014    gb sono-stone    • GERD (gastroesophageal reflux disease)    • H/O complete eye exam    • H/O mammogram    • History of dental examination 2016   • Hyperlipidemia    • Hypertension    • Infectious viral hepatitis    • Non-alcoholic fatty liver disease    • Polyp of stomach    • Spinal stenosis    • UTI (urinary  tract infection)        Past Surgical History:   Procedure Laterality Date   • BILATERAL BREAST REDUCTION     • CARDIAC CATHETERIZATION     • COLON SURGERY  08/2014    TUBULAR ADENOMA RESECTED   • COLONOSCOPY  2013   • ENDOSCOPY  08/2014    (Dr Huffman)   • ESOPHAGEAL DILATATION     • OTHER SURGICAL HISTORY  08/2014    bx stomach polyps and colon polyps       Family History: family history includes Adrenal disorder in an other family member; Arthritis in her brother and father; Brain cancer in an other family member; Breast cancer in an other family member; Cancer in her mother, sister, and sister; Colon cancer in her mother and sister; Diabetes in her brother; Heart disease in her sister; Hypertension in her brother and father; Ovarian cancer in her sister; Stroke in her father. Otherwise pertinent FHx was reviewed and not pertinent to current issue.    Social History:  reports that she has quit smoking. She has never used smokeless tobacco. She reports that she does not drink alcohol and does not use drugs.    Home Medications:  Prior to Admission Medications     Prescriptions Last Dose Informant Patient Reported? Taking?    acetaminophen (TYLENOL) 325 MG tablet   Yes No    Take 650 mg by mouth 2 (Two) Times a Day As Needed for Mild Pain .    ARIPiprazole (ABILIFY) 2 MG tablet   Yes No    Take 2 mg by mouth Every Night.    atorvastatin (LIPITOR) 80 MG tablet   No No    Take 1 tablet by mouth Every Night.    benzonatate (TESSALON) 100 MG capsule   Yes No    Take 100 mg by mouth Every 8 (Eight) Hours As Needed for Cough.    calcium carb-cholecalciferol (Calcium 600+D3) 600-800 MG-UNIT tablet   Yes No    Take 1 tablet by mouth 2 (two) times a day.    cephalexin (Keflex) 500 MG capsule   No No    Take 1 capsule by mouth 2 (Two) Times a Day.    divalproex (DEPAKOTE) 125 MG DR tablet   Yes No    Take 125 mg by mouth 2 (Two) Times a Day.    ezetimibe (Zetia) 10 MG tablet   Yes No    Take 10 mg by mouth Daily.     FLUoxetine (PROzac) 40 MG capsule   Yes No    Take 40 mg by mouth Every Night.    fluticasone (FLONASE) 50 MCG/ACT nasal spray   Yes No    2 sprays by Each Nare route Daily As Needed.    furosemide (LASIX) 40 MG tablet   Yes No    Take 40 mg by mouth Daily.    HYDROcodone-acetaminophen (NORCO) 5-325 MG per tablet   Yes No    Take 1 tablet by mouth 3 (Three) Times a Day.    loperamide (Loperamide A-D) 2 MG tablet   Yes No    Take 2 mg by mouth Every 6 (Six) Hours As Needed for Diarrhea.    Menthol, Topical Analgesic, (Biofreeze) 4 % gel   Yes No    Apply 1 application topically 3 (Three) Times a Day As Needed. Apply to neck    metOLazone (ZAROXOLYN) 2.5 MG tablet   Yes No    Take 2.5 mg by mouth Daily.    metOLazone (ZAROXOLYN) 5 MG tablet   Yes No    Take 5 mg by mouth Daily.    Mirabegron ER (MYRBETRIQ) 50 MG tablet sustained-release 24 hour 24 hr tablet   Yes No    Take 50 mg by mouth Daily.    omeprazole (priLOSEC) 20 MG capsule   No No    TAKE ONE CAPSULE BY MOUTH EVERY DAY    ondansetron (ZOFRAN) 4 MG tablet   Yes No    Take 4 mg by mouth Every 6 (Six) Hours As Needed for Nausea or Vomiting.    polyethylene glycol (MIRALAX) 17 GM/SCOOP powder   Yes No    Take 15 g by mouth Daily As Needed.    polyvinyl alcohol (Artificial Tears) 1.4 % ophthalmic solution   Yes No    Administer 1 drop to both eyes Every 4 (Four) Hours As Needed for Dry Eyes.    potassium chloride (K-DUR,KLOR-CON) 10 MEQ CR tablet   Yes No    Take 10 mEq by mouth 2 (Two) Times a Day.    topiramate (TOPAMAX) 50 MG tablet   Yes No    Take 150 mg by mouth every night at bedtime.    traZODone (DESYREL) 50 MG tablet   Yes No    Take 25-50 mg by mouth Every Night.            Allergies:  Allergies   Allergen Reactions   • Morphine Other (See Comments)     Mood changes   • Nsaids    • Sulfa Antibiotics    • Vancomycin Rash       Objective      Vitals:   Temp:  [99 °F (37.2 °C)] 99 °F (37.2 °C)  Heart Rate:  [62-65] 62  Resp:  [16] 16  BP:  (102-116)/(59-60) 102/59    Physical Exam   Constitutional:  oriented to person, place, and time. No distress.   HENT:   Head: Normocephalic and atraumatic.   Eyes: Conjunctivae and EOM are normal. Pupils are equal, round, and reactive to light.   Neck: No JVD present. No thyromegaly present.   Cardiovascular: Normal rate, regular rhythm, normal heart sounds and intact distal pulses. Exam reveals no gallop and no friction rub.   No murmur heard.  Pulmonary/Chest: Effort normal and breath sounds normal. No stridor. No respiratory distress.  has no wheezes.  has no rales.  exhibits no tenderness.   Abdominal: Soft. Bowel sounds are normal.  no distension and no mass. There is no tenderness. There is no rebound and no guarding. No hernia.   Musculoskeletal: Normal range of motion.  Right lower extremity no swelling no edema no erythema no ecchymosis full range of motion no tenderness  Lymphadenopathy:     no cervical adenopathy.   Neurological:  alert and oriented to person, place, and time. No cranial nerve deficit or sensory deficit. exhibits normal muscle tone.   Skin: No rash noted.  not diaphoretic.   Psychiatric:  normal mood and affect.   Vitals reviewed.      Result Review    Result Review:  I have personally reviewed the results from the time of this admission to 9/9/2021 17:21 EDT and agree with these findings:  []  Laboratory  []  Microbiology  [x]  Radiology  []  EKG/Telemetry   []  Cardiology/Vascular   []  Pathology  []  Old records  []  Other:  Most notable findings include: Normal x-ray of right foot    Assessment/Plan        Active Hospital Problems:  There are no active hospital problems to display for this patient.    Plan:   Right foot pain  X-ray negative, possibly arthritis  Pain management now  Check CBC BMP    Conditioning  Social work case management consult for placement  PT OT    Psych unspecified  Restart home meds when med rec available    Dyslipidemia  We will restart home statin when  available    Chronic lymphadenopathy  We will restart her diuretics when med rec done    Otherwise will await blood work results and med rec    DVT PUD prophylaxis    Plan as above        DVT prophylaxis:  No DVT prophylaxis order currently exists.    CODE STATUS:       Admission Status:  I believe this patient meets observation status.    I discussed the patient's findings and my recommendations with patient.    Signature:   Electronically signed by Nahomy Betts MD, 09/09/21, 5:29 PM EDT.

## 2021-09-09 NOTE — CASE MANAGEMENT/SOCIAL WORK
Case Management/Social Work    Patient Name:  Sarah Zendejas  YOB: 1931  MRN: [id number]  Admit Date:  9/9/2021    Spoke with representative at [facility], who said they can not accept patient back due to decreased mobility.  Spoke with PT here in ED also said not able to return assisted living level of care at this time. Updated JOSELITO Lord of situation. I asked if patient can stay at  her house until she can make other arrangements. She said she can work out 24/7 care by tomorrow. Patent current with [facility] for PT/OT and bath aide. Updated [facility] of [facility] location tomorrow.Discussed this situation with Khushi PATEL.          Electronically signed by:  Jaylene Jacques RN  09/09/21 16:39 EDT

## 2021-09-09 NOTE — PLAN OF CARE
Goal Outcome Evaluation:  Plan of Care Reviewed With: patient        Progress: no change  Outcome Summary: Pt. is 88 y/o female admit from assisted living w/ AMS and R foot pain, XR (-) fracture. Pt. w/ recent hospital admit and subsequent rehab stay, states she is typically ADL independent and ambulates unassisted, although has had continues mobility deficits. Pt. completes functional tranfsers w/ mod A this date and increased ADL support max for donning/doffing socks seated at EOB. Pt. also w/ continued L shoulder AROM deficits from previous admit albeit improved AROM up to 70* flexion and 120* PROM. Pt. not safe to d/c home at this time and will require IP rehab at d/c to address aforementioned deficits. Will follow up w/ pt. 1-3x per week at [facility].

## 2021-09-10 VITALS
HEART RATE: 60 BPM | DIASTOLIC BLOOD PRESSURE: 65 MMHG | HEIGHT: 57 IN | RESPIRATION RATE: 12 BRPM | TEMPERATURE: 98.8 F | OXYGEN SATURATION: 97 % | BODY MASS INDEX: 30.87 KG/M2 | SYSTOLIC BLOOD PRESSURE: 102 MMHG | WEIGHT: 143.1 LBS

## 2021-09-10 LAB
ANION GAP SERPL CALCULATED.3IONS-SCNC: 11 MMOL/L (ref 5–15)
BASOPHILS # BLD AUTO: 0 10*3/MM3 (ref 0–0.2)
BASOPHILS NFR BLD AUTO: 0.5 % (ref 0–1.5)
BUN SERPL-MCNC: 19 MG/DL (ref 8–23)
BUN/CREAT SERPL: 25.3 (ref 7–25)
CALCIUM SPEC-SCNC: 8.2 MG/DL (ref 8.6–10.5)
CHLORIDE SERPL-SCNC: 96 MMOL/L (ref 98–107)
CO2 SERPL-SCNC: 25 MMOL/L (ref 22–29)
CREAT SERPL-MCNC: 0.75 MG/DL (ref 0.57–1)
DEPRECATED RDW RBC AUTO: 48.6 FL (ref 37–54)
EOSINOPHIL # BLD AUTO: 0 10*3/MM3 (ref 0–0.4)
EOSINOPHIL NFR BLD AUTO: 0.5 % (ref 0.3–6.2)
ERYTHROCYTE [DISTWIDTH] IN BLOOD BY AUTOMATED COUNT: 14.9 % (ref 12.3–15.4)
GFR SERPL CREATININE-BSD FRML MDRD: 73 ML/MIN/1.73
GLUCOSE SERPL-MCNC: 118 MG/DL (ref 65–99)
HCT VFR BLD AUTO: 28.7 % (ref 34–46.6)
HGB BLD-MCNC: 9.9 G/DL (ref 12–15.9)
LYMPHOCYTES # BLD AUTO: 1.4 10*3/MM3 (ref 0.7–3.1)
LYMPHOCYTES NFR BLD AUTO: 19.1 % (ref 19.6–45.3)
MAGNESIUM SERPL-MCNC: 1.5 MG/DL (ref 1.6–2.4)
MCH RBC QN AUTO: 32.4 PG (ref 26.6–33)
MCHC RBC AUTO-ENTMCNC: 34.3 G/DL (ref 31.5–35.7)
MCV RBC AUTO: 94.4 FL (ref 79–97)
MONOCYTES # BLD AUTO: 1.1 10*3/MM3 (ref 0.1–0.9)
MONOCYTES NFR BLD AUTO: 15.4 % (ref 5–12)
NEUTROPHILS NFR BLD AUTO: 4.7 10*3/MM3 (ref 1.7–7)
NEUTROPHILS NFR BLD AUTO: 64.5 % (ref 42.7–76)
NRBC BLD AUTO-RTO: 0 /100 WBC (ref 0–0.2)
PLATELET # BLD AUTO: 264 10*3/MM3 (ref 140–450)
PMV BLD AUTO: 8.3 FL (ref 6–12)
POTASSIUM SERPL-SCNC: 2.7 MMOL/L (ref 3.5–5.2)
RBC # BLD AUTO: 3.04 10*6/MM3 (ref 3.77–5.28)
SODIUM SERPL-SCNC: 132 MMOL/L (ref 136–145)
WBC # BLD AUTO: 7.3 10*3/MM3 (ref 3.4–10.8)

## 2021-09-10 PROCEDURE — 83735 ASSAY OF MAGNESIUM: CPT | Performed by: HOSPITALIST

## 2021-09-10 PROCEDURE — 96365 THER/PROPH/DIAG IV INF INIT: CPT

## 2021-09-10 PROCEDURE — 36415 COLL VENOUS BLD VENIPUNCTURE: CPT | Performed by: HOSPITALIST

## 2021-09-10 PROCEDURE — 97530 THERAPEUTIC ACTIVITIES: CPT

## 2021-09-10 PROCEDURE — 99217 PR OBSERVATION CARE DISCHARGE MANAGEMENT: CPT | Performed by: HOSPITALIST

## 2021-09-10 PROCEDURE — 96372 THER/PROPH/DIAG INJ SC/IM: CPT

## 2021-09-10 PROCEDURE — 80048 BASIC METABOLIC PNL TOTAL CA: CPT | Performed by: HOSPITALIST

## 2021-09-10 PROCEDURE — 25010000002 HEPARIN (PORCINE) PER 1000 UNITS: Performed by: HOSPITALIST

## 2021-09-10 PROCEDURE — 96366 THER/PROPH/DIAG IV INF ADDON: CPT

## 2021-09-10 PROCEDURE — 97162 PT EVAL MOD COMPLEX 30 MIN: CPT

## 2021-09-10 PROCEDURE — G0378 HOSPITAL OBSERVATION PER HR: HCPCS

## 2021-09-10 PROCEDURE — 85025 COMPLETE CBC W/AUTO DIFF WBC: CPT | Performed by: HOSPITALIST

## 2021-09-10 PROCEDURE — 25010000002 MAGNESIUM SULFATE 2 GM/50ML SOLUTION: Performed by: HOSPITALIST

## 2021-09-10 RX ORDER — POTASSIUM CHLORIDE 750 MG/1
10 TABLET, FILM COATED, EXTENDED RELEASE ORAL DAILY
Status: DISCONTINUED | OUTPATIENT
Start: 2021-09-10 | End: 2021-09-10 | Stop reason: HOSPADM

## 2021-09-10 RX ORDER — DIVALPROEX SODIUM 125 MG/1
125 TABLET, DELAYED RELEASE ORAL 2 TIMES DAILY
Status: DISCONTINUED | OUTPATIENT
Start: 2021-09-10 | End: 2021-09-10 | Stop reason: HOSPADM

## 2021-09-10 RX ORDER — PANTOPRAZOLE SODIUM 40 MG/1
40 TABLET, DELAYED RELEASE ORAL EVERY MORNING
Refills: 0 | Status: DISCONTINUED | OUTPATIENT
Start: 2021-09-10 | End: 2021-09-10 | Stop reason: HOSPADM

## 2021-09-10 RX ORDER — FLUOXETINE HYDROCHLORIDE 20 MG/1
40 CAPSULE ORAL NIGHTLY
Status: DISCONTINUED | OUTPATIENT
Start: 2021-09-10 | End: 2021-09-10 | Stop reason: HOSPADM

## 2021-09-10 RX ORDER — ARIPIPRAZOLE 2 MG/1
2 TABLET ORAL NIGHTLY
Status: DISCONTINUED | OUTPATIENT
Start: 2021-09-10 | End: 2021-09-10 | Stop reason: HOSPADM

## 2021-09-10 RX ORDER — MAGNESIUM SULFATE HEPTAHYDRATE 40 MG/ML
2 INJECTION, SOLUTION INTRAVENOUS AS NEEDED
Status: DISCONTINUED | OUTPATIENT
Start: 2021-09-10 | End: 2021-09-10 | Stop reason: HOSPADM

## 2021-09-10 RX ORDER — ATORVASTATIN CALCIUM 40 MG/1
80 TABLET, FILM COATED ORAL NIGHTLY
Status: DISCONTINUED | OUTPATIENT
Start: 2021-09-10 | End: 2021-09-10 | Stop reason: HOSPADM

## 2021-09-10 RX ORDER — POTASSIUM CHLORIDE 1.5 G/1.77G
40 POWDER, FOR SOLUTION ORAL AS NEEDED
Status: DISCONTINUED | OUTPATIENT
Start: 2021-09-10 | End: 2021-09-10 | Stop reason: HOSPADM

## 2021-09-10 RX ORDER — OXYBUTYNIN CHLORIDE 10 MG/1
10 TABLET, EXTENDED RELEASE ORAL DAILY
Status: DISCONTINUED | OUTPATIENT
Start: 2021-09-10 | End: 2021-09-10 | Stop reason: HOSPADM

## 2021-09-10 RX ORDER — FENTANYL/ROPIVACAINE/NS/PF 2-625MCG/1
15 PLASTIC BAG, INJECTION (ML) EPIDURAL AS NEEDED
Status: DISCONTINUED | OUTPATIENT
Start: 2021-09-10 | End: 2021-09-10 | Stop reason: HOSPADM

## 2021-09-10 RX ORDER — BENZONATATE 100 MG/1
100 CAPSULE ORAL EVERY 8 HOURS PRN
Status: DISCONTINUED | OUTPATIENT
Start: 2021-09-10 | End: 2021-09-10 | Stop reason: HOSPADM

## 2021-09-10 RX ORDER — POTASSIUM CHLORIDE 7.45 MG/ML
10 INJECTION INTRAVENOUS
Status: DISCONTINUED | OUTPATIENT
Start: 2021-09-10 | End: 2021-09-10 | Stop reason: SDUPTHER

## 2021-09-10 RX ORDER — TRAZODONE HYDROCHLORIDE 50 MG/1
25 TABLET ORAL NIGHTLY
Status: DISCONTINUED | OUTPATIENT
Start: 2021-09-10 | End: 2021-09-10 | Stop reason: HOSPADM

## 2021-09-10 RX ORDER — POLYETHYLENE GLYCOL 3350 17 G/17G
17 POWDER, FOR SOLUTION ORAL DAILY
Status: DISCONTINUED | OUTPATIENT
Start: 2021-09-10 | End: 2021-09-10 | Stop reason: HOSPADM

## 2021-09-10 RX ORDER — POTASSIUM CHLORIDE 20 MEQ/1
40 TABLET, EXTENDED RELEASE ORAL AS NEEDED
Status: DISCONTINUED | OUTPATIENT
Start: 2021-09-10 | End: 2021-09-10 | Stop reason: HOSPADM

## 2021-09-10 RX ORDER — METOLAZONE 2.5 MG/1
2.5 TABLET ORAL DAILY
Status: DISCONTINUED | OUTPATIENT
Start: 2021-09-10 | End: 2021-09-10 | Stop reason: HOSPADM

## 2021-09-10 RX ORDER — HYDROCODONE BITARTRATE AND ACETAMINOPHEN 5; 325 MG/1; MG/1
1 TABLET ORAL EVERY 8 HOURS PRN
Status: DISCONTINUED | OUTPATIENT
Start: 2021-09-10 | End: 2021-09-10 | Stop reason: HOSPADM

## 2021-09-10 RX ORDER — MAGNESIUM SULFATE HEPTAHYDRATE 40 MG/ML
4 INJECTION, SOLUTION INTRAVENOUS AS NEEDED
Status: DISCONTINUED | OUTPATIENT
Start: 2021-09-10 | End: 2021-09-10 | Stop reason: HOSPADM

## 2021-09-10 RX ORDER — CEPHALEXIN 500 MG/1
500 CAPSULE ORAL 2 TIMES DAILY
Status: DISCONTINUED | OUTPATIENT
Start: 2021-09-10 | End: 2021-09-10

## 2021-09-10 RX ORDER — POTASSIUM CHLORIDE 7.45 MG/ML
10 INJECTION INTRAVENOUS
Status: DISCONTINUED | OUTPATIENT
Start: 2021-09-10 | End: 2021-09-10 | Stop reason: HOSPADM

## 2021-09-10 RX ORDER — FUROSEMIDE 40 MG/1
40 TABLET ORAL DAILY
Status: DISCONTINUED | OUTPATIENT
Start: 2021-09-10 | End: 2021-09-10 | Stop reason: HOSPADM

## 2021-09-10 RX ADMIN — Medication 3 ML: at 08:11

## 2021-09-10 RX ADMIN — ACETAMINOPHEN 650 MG: 325 TABLET, FILM COATED ORAL at 05:50

## 2021-09-10 RX ADMIN — POTASSIUM CHLORIDE 10 MEQ: 750 TABLET, EXTENDED RELEASE ORAL at 11:32

## 2021-09-10 RX ADMIN — POTASSIUM CHLORIDE 40 MEQ: 1500 TABLET, EXTENDED RELEASE ORAL at 06:02

## 2021-09-10 RX ADMIN — POTASSIUM CHLORIDE 40 MEQ: 1500 TABLET, EXTENDED RELEASE ORAL at 10:23

## 2021-09-10 RX ADMIN — POTASSIUM CHLORIDE 40 MEQ: 1500 TABLET, EXTENDED RELEASE ORAL at 13:58

## 2021-09-10 RX ADMIN — METOLAZONE 2.5 MG: 2.5 TABLET ORAL at 11:32

## 2021-09-10 RX ADMIN — MAGNESIUM SULFATE HEPTAHYDRATE 2 G: 2 INJECTION, SOLUTION INTRAVENOUS at 15:19

## 2021-09-10 RX ADMIN — HEPARIN SODIUM 5000 UNITS: 5000 INJECTION INTRAVENOUS; SUBCUTANEOUS at 13:09

## 2021-09-10 RX ADMIN — DIVALPROEX SODIUM 125 MG: 125 TABLET, DELAYED RELEASE ORAL at 11:32

## 2021-09-10 RX ADMIN — MAGNESIUM SULFATE HEPTAHYDRATE 2 G: 2 INJECTION, SOLUTION INTRAVENOUS at 08:10

## 2021-09-10 RX ADMIN — FUROSEMIDE 40 MG: 40 TABLET ORAL at 11:32

## 2021-09-10 RX ADMIN — POLYETHYLENE GLYCOL 3350 17 G: 17 POWDER, FOR SOLUTION ORAL at 11:32

## 2021-09-10 RX ADMIN — OXYBUTYNIN CHLORIDE 10 MG: 10 TABLET, EXTENDED RELEASE ORAL at 11:32

## 2021-09-10 RX ADMIN — PANTOPRAZOLE SODIUM 40 MG: 40 TABLET, DELAYED RELEASE ORAL at 11:32

## 2021-09-10 RX ADMIN — MAGNESIUM SULFATE HEPTAHYDRATE 2 G: 2 INJECTION, SOLUTION INTRAVENOUS at 13:09

## 2021-09-10 RX ADMIN — HEPARIN SODIUM 5000 UNITS: 5000 INJECTION INTRAVENOUS; SUBCUTANEOUS at 05:50

## 2021-09-10 NOTE — DISCHARGE PLACEMENT REQUEST
"Sarah Zendejas (89 y.o. Female)     Date of Birth Social Security Number Address Home Phone MRN    12/23/1931  [address] [place] [address] [phone] [id number]    Muslim Marital Status          Church Single       Admission Date Admission Type Admitting Provider Attending Provider Department, Room/Bed    9/9/21 Emergency Nahomy Betts MD Piwko, Radomir, MD [facility] 2B MEDICAL INPATIENT, 227/1    Discharge Date Discharge Disposition Discharge Destination                       Attending Provider: Nahomy Betts MD    Allergies: Morphine, Nsaids, Sulfa Antibiotics, Vancomycin    Isolation: None   Infection: None   Code Status: CPR    Ht: 144.8 cm (57\")   Wt: 64.9 kg (143 lb 1.6 oz)    Admission Cmt: None   Principal Problem: None                Active Insurance as of 9/9/2021     Primary Coverage     Payor Plan Insurance Group Employer/Plan Group    [place] [employer] MEDICARE REPLACEMENT [employer] MEDICARE REPLACEMENT [id number]     Payor Plan Address Payor Plan Phone Number Payor Plan Fax Number Effective Dates    [address] [address]   1/1/2019 - None Entered    [place] [id number]       Subscriber Name Subscriber Birth Date Member ID       SARAH ZENDEJAS 12/23/1931 [id number]                 Emergency Contacts      (Rel.) Home Phone Work Phone Mobile Phone    Malinda Lord (Relative) [phone] -- --            Insurance Information                [employer] MEDICARE REPLACEMENT/UNITED [employer] MEDICARE REPLACEMENT Phone:     Subscriber: Sarah Zendejas Subscriber#: [id number]    Group#: [id number] Precert#:           "

## 2021-09-10 NOTE — CASE MANAGEMENT/SOCIAL WORK
Continued Stay Note  CARM[facility] Dickerson     Patient Name: Sarah Zendejas  MRN: [id number]  Today's Date: 9/10/2021    Admit Date: 9/9/2021    Discharge Plan     Row Name 09/10/21 1034       Plan    Plan Comments  [facility] accepted. will start precert.    Row Name 09/10/21 0956       Plan    Plan  DC Plan: Referral to [facility], inpt rehab. Pt also has Medicaid.    Plan Comments  From [facility], pt had been DCed from [facility] 3 days ago.      Chart review only.             Andrew Hsieh RN

## 2021-09-10 NOTE — PLAN OF CARE
Goal Outcome Evaluation:  Plan of Care Reviewed With: patient        Progress: improving  Outcome Summary: Pt is an 89 y.o. female admitted to [facility] for R foot pain, XR (-) fracture. Pt PMH includes liver disease, CKD, HTN and immobility. Pt had been living at [facility], assisted living [facility], however they are unable to care for her current level of assist. Pt has had recent hospital admit and subsequent rehab stay, states she had been independent with ADLs however has noticed decrease in ambulation ability and could only walk short distances with rolling walker. Pt was oriented x4 this date and presented with global weakness (3+/5) as shown in functional abilities. Pt able to complete functional transfer to RW with min A with verbal cueing for pushing off of bed.  Pt able to ambulate 20 feet with RW and min A, noted significantly decreased gait speed and pain in B knees d/t arthritis. Noted some swelling in L UE possibly d/t IV. Therapist removed patient’s watch to prevent decreased circulation and notified nursing staff. Pt stated she would like to return to [facility] but will need to get stronger first. Recommending IP rehab d/t significant decline from PLOF and inability to care for self at this time.

## 2021-09-10 NOTE — DISCHARGE SUMMARY
[facility] Medicine Services  DISCHARGE SUMMARY    Patient Name: Sarah Zendejas  : 1931  MRN: [id number]    Date of Admission: 2021  Date of Discharge: 9/10/2021  Primary Care Physician: Victoria Naik      Presenting Problem:   Right foot pain [M79.671]  Immobility [Z74.09]    Active and Resolved Hospital Problems:  Active Hospital Problems    Diagnosis POA   • Immobility [Z74.09] Yes      Resolved Hospital Problems   No resolved problems to display.         Hospital Course     Hospital Course:  Chief Complaint: Foot pain     History of Present Illness: 89-year-old multiple medical problems recently hospitalized at our facility discharged to SNF.  She was transferred to another rehab facility for unclear to me reasons, either way patient was found to require more needs than that facility could offer and she was subsequently transferred to our ER.  She was in SNF due to deconditioning and does need a lot of PT and OT.  Her only complaint right now was right foot pain.  She has no swelling no erythema no fevers no chills associated with it.  There is been no trauma.  She can bear weight on it.     9/10/2021 seen and examined at bedside.  Continues to be asymptomatic no complaints     ER x-ray of right foot with no acute findings  Review of systems  General no fevers no chills  Neuro no focal weakness no focal numbness  Cardiovascular no chest pain no palpitations  Pulmonary no shortness of breath no cough  Abdomen no pain no nausea no vomiting no blood per any orifice  Urinary no dysuria no polyuria positive for urinary incontinence  Muscleskeletal right foot pain, no muscle pain  Skin no rashes     Hospital course and problem list    Right foot pain  Resolved  X-ray negative, possibly arthritis  Pain management now     Deconditioning  Social work case management consult for placement  PT OT     Psych unspecified  Restarted home meds Abilify, Depakote, Prozac,  trazodone,     Chronic lymphadenopathy  Restarted home diuretics     Hypokalemia  Restart p.o. potassium scheduled and replace otherwise per protocol as needed     Headaches  Continue Topamax     Urinary incontinence  Continue oxybutynin     Cellulitis  Finished course of antibiotic outpatient     DVT PUD prophylaxis     Plan discharge to rehab facility stable condition      Reasons For Change In Medications and Indications for New Medications:      Day of Discharge     Vital Signs:  Temp:  [98.3 °F (36.8 °C)-99.8 °F (37.7 °C)] 98.8 °F (37.1 °C)  Heart Rate:  [60-68] 60  Resp:  [12-20] 12  BP: (102-120)/(59-68) 102/65    Physical Exam:  Physical Exam   Constitutional:  oriented to person, place, and time. No distress.   HENT:   Head: Normocephalic and atraumatic.   Eyes: Conjunctivae and EOM are normal. Pupils are equal, round, and reactive to light.   Neck: No JVD present. No thyromegaly present.   Cardiovascular: Normal rate, regular rhythm, normal heart sounds and intact distal pulses. Exam reveals no gallop and no friction rub.   No murmur heard.  Pulmonary/Chest: Effort normal and breath sounds normal. No stridor. No respiratory distress.  has no wheezes.  has no rales.  exhibits no tenderness.   Abdominal: Soft. Bowel sounds are normal.  no distension and no mass. There is no tenderness. There is no rebound and no guarding. No hernia.   Musculoskeletal: Normal range of motion.   Lymphadenopathy:     no cervical adenopathy.   Neurological:  alert and oriented to person, place, and time. No cranial nerve deficit or sensory deficit. exhibits normal muscle tone.   Skin: No rash noted.  not diaphoretic.   Psychiatric:  normal mood and affect.   Vitals reviewed.        Pertinent  and/or Most Recent Results     LAB RESULTS:      Lab 09/10/21  0434 09/09/21  1727   WBC 7.30 9.00   HEMOGLOBIN 9.9* 10.9*   HEMATOCRIT 28.7* 31.0*   PLATELETS 264 303   NEUTROS ABS 4.70 5.80   LYMPHS ABS 1.40 1.60   MONOS ABS 1.10* 1.60*    EOS ABS 0.00 0.00   MCV 94.4 93.8         Lab 09/10/21  0434 09/09/21  1727   SODIUM 132* 134*   POTASSIUM 2.7* 3.3*   CHLORIDE 96* 95*   CO2 25.0 26.0   ANION GAP 11.0 13.0   BUN 19 23   CREATININE 0.75 0.88   GLUCOSE 118* 96   CALCIUM 8.2* 8.3*   MAGNESIUM 1.5* 1.8                         Brief Urine Lab Results  (Last result in the past 365 days)      Color   Clarity   Blood   Leuk Est   Nitrite   Protein   CREAT   Urine HCG        09/09/21 1842 Yellow Clear Negative Small (1+) Negative Negative             Microbiology Results (last 10 days)     Procedure Component Value - Date/Time    COVID PRE-OP / PRE-PROCEDURE SCREENING ORDER (NO ISOLATION) - Swab, Nasopharynx [[id number]]  (Normal) Collected: 09/09/21 1839    Lab Status: Final result Specimen: Swab from Nasopharynx Updated: 09/09/21 1904    Narrative:      The following orders were created for panel order COVID PRE-OP / PRE-PROCEDURE SCREENING ORDER (NO ISOLATION) - Swab, Nasopharynx.  Procedure                               Abnormality         Status                     ---------                               -----------         ------                     COVID-19,CEPHEID/JORDYN/BD...[[id number]]  Normal              Final result                 Please view results for these tests on the individual orders.    COVID-19,CEPHEID/JORDYN/BDMAX,COR/JAYY/PAD/HAYLIE IN-HOUSE(OR EMERGENT/ADD-ON),NP SWAB IN TRANSPORT MEDIA 3-4 HR TAT, RT-PCR - Swab, Nasopharynx [[id number]]  (Normal) Collected: 09/09/21 1839    Lab Status: Final result Specimen: Swab from Nasopharynx Updated: 09/09/21 1904     COVID19 Not Detected    Narrative:      Fact sheet for providers: https://www.fda.gov/media/562798/download     Fact sheet for patients: https://www.fda.gov/media/492743/download  Fact sheet for providers: https://www.fda.gov/media/994010/download    Fact sheet for patients: https://www.fda.gov/media/658744/download    Test performed by PCR.          XR Humerus Left    Result Date:  8/18/2021  Impression: 1. Negative for fracture or dislocation. 2. Advanced glenohumeral osteoarthritis.  Electronically Signed By-Nehemiah Brink MD On:8/18/2021 10:14 AM This report was finalized on [id number] by  Nehemiah Brink MD.    XR Foot 3+ View Left    Result Date: 8/18/2021  Impression: Left foot degenerative changes with osteopenia. No acute left foot findings.  Electronically Signed By-Winter Jeronimo MD On:8/18/2021 10:09 AM This report was finalized on [id number] by  Winter Jeronimo MD.    XR Foot 3+ View Right    Result Date: 9/9/2021  Impression:  1. No acute right foot finding. 2. Advanced osteopenia. 3. Degenerative changes of the right foot and ankle as described.  Electronically Signed By-Winter Jeronimo MD On:9/9/2021 3:14 PM This report was finalized on [id number] by  Winter Jeronimo MD.    CT Head Without Contrast    Result Date: 8/19/2021  Impression:  1. No acute intracranial finding. 2. Features of mild chronic microvascular disease.  Electronically Signed By-Winter Jeronimo MD On:8/19/2021 1:37 PM This report was finalized on [id number] by  Winter Jeronimo MD.    MRI Brain Without Contrast    Result Date: 8/19/2021  Impression:  1. No acute findings. 2. Volume loss secondary to cerebral atrophy. 3. Chronic ischemic changes. 4. 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   Electronically Signed By-Hugo Smith MD On:8/19/2021 4:18 PM This report was finalized on [id number] by  Hugo Smith MD.    XR Chest 1 View    Result Date: 8/24/2021  Impression: No active disease.  Electronically Signed By-Hugo Smith MD On:8/24/2021 10:02 AM This report was finalized on [id number] by  Hugo Smith MD.      Results for orders placed during the hospital encounter of 08/18/21    Duplex Venous Upper Extremity - Right CAR    Interpretation Summary  · Normal right upper extremity venous duplex scan.      Results for orders placed during the hospital encounter of 08/18/21    Duplex Venous  Upper Extremity - Right CAR    Interpretation Summary  · Normal right upper extremity venous duplex scan.          Labs Pending at Discharge:      Procedures Performed           Consults:   Consults     Date and Time Order Name Status Description    9/9/2021  4:58 PM Hospitalist (on-call MD unless specified) Completed     8/19/2021  9:04 AM Inpatient Neurology Consult General Completed     8/18/2021 11:18 AM Hospitalist (on-call MD unless specified) Completed             Discharge Details        Discharge Medications      Continue These Medications      Instructions Start Date   acetaminophen 325 MG tablet  Commonly known as: TYLENOL   650 mg, Oral, 2 Times Daily PRN      ARIPiprazole 2 MG tablet  Commonly known as: ABILIFY   2 mg, Oral, Nightly      Artificial Tears 1.4 % ophthalmic solution  Generic drug: polyvinyl alcohol   1 drop, Both Eyes, Every 4 Hours PRN      atorvastatin 80 MG tablet  Commonly known as: LIPITOR   80 mg, Oral, Nightly      benzonatate 100 MG capsule  Commonly known as: TESSALON   100 mg, Oral, Every 8 Hours PRN      Biofreeze 4 % gel  Generic drug: Menthol (Topical Analgesic)   1 application, Apply externally, 3 Times Daily PRN, Apply to neck       Calcium 600+D3 600-800 MG-UNIT tablet  Generic drug: calcium carb-cholecalciferol   1 tablet, Oral, 2 times daily      cephalexin 500 MG capsule  Commonly known as: Keflex   500 mg, Oral, 2 Times Daily      divalproex 125 MG DR tablet  Commonly known as: DEPAKOTE   125 mg, Oral, 2 Times Daily      FLUoxetine 40 MG capsule  Commonly known as: PROzac   40 mg, Oral, Nightly      fluticasone 50 MCG/ACT nasal spray  Commonly known as: FLONASE   2 sprays, Each Nare, Daily PRN      furosemide 40 MG tablet  Commonly known as: LASIX   40 mg, Oral, Daily      HYDROcodone-acetaminophen 5-325 MG per tablet  Commonly known as: NORCO   1 tablet, Oral, 3 Times Daily      Loperamide A-D 2 MG tablet  Generic drug: loperamide   2 mg, Oral, Every 6 Hours PRN       metOLazone 2.5 MG tablet  Commonly known as: ZAROXOLYN   2.5 mg, Oral, Daily      metOLazone 5 MG tablet  Commonly known as: ZAROXOLYN   5 mg, Oral, Daily      Mirabegron ER 50 MG tablet sustained-release 24 hour 24 hr tablet  Commonly known as: MYRBETRIQ   50 mg, Oral, Daily      omeprazole 20 MG capsule  Commonly known as: priLOSEC   TAKE ONE CAPSULE BY MOUTH EVERY DAY      ondansetron 4 MG tablet  Commonly known as: ZOFRAN   4 mg, Oral, Every 6 Hours PRN      polyethylene glycol 17 GM/SCOOP powder  Commonly known as: MIRALAX   15 g, Oral, Daily PRN      potassium chloride 10 MEQ CR tablet  Commonly known as: K-DUR,KLOR-CON   10 mEq, Oral, 2 Times Daily      topiramate 50 MG tablet  Commonly known as: TOPAMAX   150 mg, Oral, Every Night at Bedtime      traZODone 50 MG tablet  Commonly known as: DESYREL   25-50 mg, Oral, Nightly      Zetia 10 MG tablet  Generic drug: ezetimibe   10 mg, Oral, Daily             Allergies   Allergen Reactions   • Morphine Other (See Comments)     Mood changes   • Nsaids    • Sulfa Antibiotics    • Vancomycin Rash         Discharge Disposition: Discharged to rehab, stable condition  Skilled Nursing Facility ([place] - [place])    Diet:  Hospital:  Diet Order   Procedures   • Diet Regular         Discharge Activity:   Activity Instructions     Activity as Tolerated              CODE STATUS:  Code Status and Medical Interventions:   Ordered at: 09/10/21 0730     Level Of Support Discussed With:    Patient     Code Status:    CPR     Medical Interventions (Level of Support Prior to Arrest):    Full         No future appointments.    Additional Instructions for the Follow-ups that You Need to Schedule     Discharge Follow-up with PCP   As directed       Currently Documented PCP:    Victoria Naik    PCP Phone Number:    [phone]     Follow Up Details: one week               Time spent on Discharge including face to face service: 38 minutes    Signature:   Electronically signed by Nahomy  MD Alpesh, 09/10/21, 12:11 PM EDT.

## 2021-09-10 NOTE — CASE MANAGEMENT/SOCIAL WORK
Discharge Planning Assessment  [facility]     Patient Name: Sarah Zendejas  MRN: [id number]  Today's Date: 9/10/2021    Admit Date: 9/9/2021    Discharge Needs Assessment     Row Name 09/10/21 0951       Living Environment    Lives With  other (see comments)    Unique Family Situation  [facility]    Current Living Arrangements  independent/assisted living facility    Primary Care Provided by  other (see comments)    Provides Primary Care For  no one, unable/limited ability to care for self    Family Caregiver if Needed  child(leela), adult    Family Caregiver Names  Malinda Lord    Living Arrangement Comments  Pt returned to [place] 3 days ago, unable to function alone per Malinda Lord.       Resource/Environmental Concerns    Resource/Environmental Concerns  none       Transition Planning    Patient/Family Anticipates Transition to  long-term care facility;inpatient rehabilitation facility    Patient/Family Anticipated Services at Transition  skilled nursing;rehabilitation services    Transportation Anticipated  family or friend will provide       Discharge Needs Assessment    Readmission Within the Last 30 Days  -- Recent DC to [facility].    Equipment Currently Used at Home  rollator;wheelchair;shower chair Recliner    Concerns to be Addressed  discharge planning    Anticipated Changes Related to Illness  inability to care for self    Patient's Choice of Community Agency(s)  [facility] per daughter.    Discharge Coordination/Progress  DC Plan: [facility]. Pt also has Medicaid.        Discharge Plan     Row Name 09/10/21 0956       Plan    Plan  DC Plan: [facility]. Pt also has Medicaid.    Plan Comments  [facility], pt had been DCed from [facility] 3 days ago.        Continued Care and Services - Admitted Since 9/9/2021     Destination     Service Provider Request Status Selected Services Address Phone Fax Patient Preferred     [facility] [place]  Pending - Request Sent N/A [address] [place] [address] [phone] --          Home Medical Care Coordination complete.    Service Provider Request Status Selected Services Address Phone Fax Patient Preferred    [facility] [address] [place] [address] [place] [address] [phone] -- --            Selected Continued Care - Prior Encounters Includes selections from prior encounters from 6/11/2021 to 9/10/2021    Discharged on 8/24/2021 Admission date: 8/18/2021 - Discharge disposition: Rehab Facility or Unit ([place] - External)    Home Medical Care     Service Provider Selected Services Address Phone Fax Patient Preferred    [facility] [address] [place] [address] [place] [address] [phone] -- --                      Demographic Summary     Row Name 09/10/21 8899       General Information    Arrived From  emergency department    Required Notices Provided  Observation Status Notice    Referral Source  admission list    Reason for Consult  discharge planning    Preferred Language  English     Used During This Interaction  no    General Information Comments  Daughter Angela Lord called to request in rehab for pt. Released from [facility] 3 days ago, was subacute, reported to daughter. referrlal to [facility] per daughter's request. Angela Lord states she is POA.    Row Name 09/10/21 0926       General Information    Admission Type  observation    Arrived From  emergency department        Functional Status     Row Name 09/10/21 8865       Functional Status    Usual Activity Tolerance  fair    Current Activity Tolerance  poor       Functional Status, IADL    Medications  assistive equipment and person    Meal Preparation  completely dependent    Housekeeping  completely dependent    Laundry  completely dependent     Shopping  completely dependent       Mental Status Summary    Recent Changes in Mental Status/Cognitive Functioning  no changes        Phone communication or documentation only - no physical contact with patient or family.           Andrew Hsieh, RN

## 2021-09-10 NOTE — THERAPY EVALUATION
Patient Name: Sarah Zendejas  : 1931    MRN: [id number]                              Today's Date: 9/10/2021       Admit Date: 2021    Visit Dx:     ICD-10-CM ICD-9-CM   1. Immobility  Z74.09 799.89   2. Right foot pain  M79.671 729.5     Patient Active Problem List   Diagnosis   • Fatty liver disease, nonalcoholic   • CKD (chronic kidney disease), stage III (CMS/HCC)   • Bipolar 1 disorder (CMS/HCC)   • Rhinitis, allergic   • Anxiety state   • Depressive disorder   • Hyperlipemia   • Benign essential HTN   • Abnormal glucose   • Bone/cartilage disorder   • Routine general medical examination at a health care facility   • Breast tenderness in female   • Screening for malignant neoplasm of cervix   • Encounter for routine gynecological examination   • Calculus of bile duct   • Gallstone   • Bladder spasm   • Weight loss   • Bursitis of knee   • Gastric reflux   • Constipated   • Cells and casts in urine   • OAB (overactive bladder)   • Left arm pain   • Left foot pain   • Hypokalemia   • Hypomagnesemia   • Cellulitis   • Right arm cellulitis   • Acute encephalopathy   • Acute UTI (urinary tract infection)   • Acute cystitis with hematuria   • Immobility     Past Medical History:   Diagnosis Date   • Allergic rhinitis    • Anemia    • Bipolar 1 disorder (CMS/HCC)    • Broken bones     cheek bone   • Bursitis of knee    • Calculus of bile duct    • Chronic kidney disease     STAGE 3   • Colon polyps    • Depression    • Dysuria    • Fundic gland polyposis of stomach 2014   • Gallstones 2014    gb sono-stone    • GERD (gastroesophageal reflux disease)    • H/O complete eye exam    • H/O mammogram    • History of dental examination 2016   • Hyperlipidemia    • Hypertension    • Infectious viral hepatitis    • Non-alcoholic fatty liver disease    • Polyp of stomach    • Spinal stenosis    • UTI (urinary tract infection)      Past Surgical History:   Procedure Laterality Date   • BILATERAL  "BREAST REDUCTION     • CARDIAC CATHETERIZATION     • COLON SURGERY  08/2014    TUBULAR ADENOMA RESECTED   • COLONOSCOPY  2013   • ENDOSCOPY  08/2014    (Dr Huffman)   • ESOPHAGEAL DILATATION     • OTHER SURGICAL HISTORY  08/2014    bx stomach polyps and colon polyps     General Information     Row Name 09/10/21 1039          Physical Therapy Time and Intention    Document Type  evaluation  -NK     Mode of Treatment  physical therapy  -NK     Row Name 09/10/21 1039          General Information    Patient Profile Reviewed  yes  -NK     Prior Level of Function  ADL's;independent: Pt had intermittent assistance from caretaker in Assisted living  -NK     Existing Precautions/Restrictions  fall  -NK     Row Name 09/10/21 1039          Living Environment    Lives With  alone  -NK     Row Name 09/10/21 1039          Home Main Entrance    Number of Stairs, Main Entrance  -- In assisted living, pt lived on second floor  -NK     Row Name 09/10/21 1039          Cognition    Orientation Status (Cognition)  oriented x 4 \"I have these questions memorized now\"  -NK     Row Name 09/10/21 1039          Safety Issues, Functional Mobility    Impairments Affecting Function (Mobility)  balance;endurance/activity tolerance;postural/trunk control;strength  -NK       User Key  (r) = Recorded By, (t) = Taken By, (c) = Cosigned By    Initials Name Provider Type    NK Marisol Luis, PT Student PT Student        Mobility     Row Name 09/10/21 1045          Bed Mobility    Bed Mobility  bed mobility (all) activities  -NK     All Activities, [place] (Bed Mobility)  minimum assist (75% patient effort)  -NK     Assistive Device (Bed Mobility)  bed rails;head of bed elevated  -NK     Row Name 09/10/21 1045          Bed-Chair Transfer    Bed-Chair [place] (Transfers)  verbal cues;minimum assist (75% patient effort)  -NK     Assistive Device (Bed-Chair Transfers)  walker, front-wheeled  -NK     Row Name 09/10/21 1045          Sit-Stand " Transfer    Sit-Stand [place] (Transfers)  minimum assist (75% patient effort);verbal cues  -NK     Assistive Device (Sit-Stand Transfers)  walker, front-wheeled  -NK     Row Name 09/10/21 1045          Gait/Stairs (Locomotion)    [place] Level (Gait)  minimum assist (75% patient effort);verbal cues  -NK     Assistive Device (Gait)  walker, front-wheeled  -NK     Distance in Feet (Gait)  20  -NK     Deviations/Abnormal Patterns (Gait)  gait speed decreased  -NK       User Key  (r) = Recorded By, (t) = Taken By, (c) = Cosigned By    Initials Name Provider Type    NK Marisol Luis, PT Student PT Student        Obj/Interventions     Row Name 09/10/21 1059          Range of Motion Comprehensive    General Range of Motion  upper extremity range of motion deficits identified  -NK     Comment, General Range of Motion  Pt had difficulty donning socks d/t limited trunk ROM  -NK     Row Name 09/10/21 1059          Strength Comprehensive (MMT)    Comment, General Manual Muscle Testing (MMT) Assessment  Pt appeared with functional deficits in strength as shown in transfers and ambulation  -NK     Row Name 09/10/21 1130          Motor Skills    Therapeutic Exercise  other (see comments) Pt instructed in 20 seated marches, 10x LAQ, 20x ankle pumps, 10x shoulder flexion  -NK     Row Name 09/10/21 1059          Balance    Balance Assessment  sitting static balance;standing static balance;standing dynamic balance  -NK     Static Sitting Balance  WFL  -NK     Static Standing Balance  WFL  -NK     Dynamic Standing Balance  mild impairment  -NK     Row Name 09/10/21 1059          Sensory Assessment (Somatosensory)    Sensory Assessment (Somatosensory)  sensation intact No sensation loss noted  -NK       User Key  (r) = Recorded By, (t) = Taken By, (c) = Cosigned By    Initials Name Provider Type    NK Marisol Luis, PT Student PT Student        Goals/Plan     Row Name 09/10/21 1118          Bed Mobility Goal 1 (PT)     Activity/Assistive Device (Bed Mobility Goal 1, PT)  bed mobility activities, all  -NK     [place] Level/Cues Needed (Bed Mobility Goal 1, PT)  independent  -NK     Time Frame (Bed Mobility Goal 1, PT)  long term goal (LTG);2 weeks  -NK     Row Name 09/10/21 1118          Transfer Goal 1 (PT)    Activity/Assistive Device (Transfer Goal 1, PT)  transfers, all  -NK     [place] Level/Cues Needed (Transfer Goal 1, PT)  modified independence  -NK     Time Frame (Transfer Goal 1, PT)  long term goal (LTG);2 weeks  -NK     Row Name 09/10/21 1118          Gait Training Goal 1 (PT)    Activity/Assistive Device (Gait Training Goal 1, PT)  gait (walking locomotion)  -NK     [place] Level (Gait Training Goal 1, PT)  modified independence  -NK     Time Frame (Gait Training Goal 1, PT)  2 weeks;long term goal (LTG)  -NK       User Key  (r) = Recorded By, (t) = Taken By, (c) = Cosigned By    Initials Name Provider Type    NK Marisol Luis, PT Student PT Student        Clinical Impression     Row Name 09/10/21 1103          Pain    Additional Documentation  Pain Scale: FACES Pre/Post-Treatment (Group)  -NK     Row Name 09/10/21 1103          Pain Scale: FACES Pre/Post-Treatment    Pain: FACES Scale, Pretreatment  2-->hurts little bit  -NK     Posttreatment Pain Rating  2-->hurts little bit  -NK     Pain Location - Orientation  -- Pt c/o of pain in knees d/t arthritis  -NK     Row Name 09/10/21 1113 09/10/21 1103       Plan of Care Review    Plan of Care Reviewed With  --  patient  -NK    Progress  --  improving  -NK    Outcome Summary  Pt is an 89 y.o. female admitted to [facility] for R foot pain, XR (-) fracture. Pt PMH includes liver disease, CKD, HTN and immobility. Pt had been living at [facility] assisted living [facility], however they are unable to care for her current level of assist. Pt has had recent hospital admit and subsequent rehab stay, states she had been independent with ADLs however has noticed decrease  in ambulation ability and could only walk short distances with rolling walker. Pt was oriented x4 this date and presented with global weakness (3+/5) as shown in functional abilities. Pt able to complete functional transfer to RW with min A with verbal cueing for pushing off of bed.  Pt able to ambulate 20 feet with RW and min A, noted significantly decreased gait speed and pain in B knees d/t arthritis. Noted some swelling in L UE possibly d/t IV. Therapist removed patient’s watch to prevent decreased circulation and notified nursing staff. Pt stated she would like to return to [facility] but will need to get stronger first. Recommending IP rehab d/t significant decline from PLOF and inability to care for self at this time.  -NK  --    Row Name 09/10/21 1113          Therapy Assessment/Plan (PT)    Rehab Potential (PT)  good, to achieve stated therapy goals  -NK     Criteria for Skilled Interventions Met (PT)  yes;skilled treatment is necessary  -NK     Predicted Duration of Therapy Intervention (PT)  until d/c  -NK     Row Name 09/10/21 1113          Vital Signs    Pre Patient Position  Supine  -NK     Intra Patient Position  Standing  -NK     Post Patient Position  Sitting  -NK     Row Name 09/10/21 1113          Positioning and Restraints    Post Treatment Position  chair  -NK     In Chair  sitting;call light within reach;encouraged to call for assist;exit alarm on;LUE elevated  -NK       User Key  (r) = Recorded By, (t) = Taken By, (c) = Cosigned By    Initials Name Provider Type    Marisol Mccarthy, PT Student PT Student        Outcome Measures     Row Name 09/10/21 1119          How much help from another person do you currently need...    Turning from your back to your side while in flat bed without using bedrails?  3  -NK     Moving from lying on back to sitting on the side of a flat bed without bedrails?  3  -NK     Moving to and from a bed to a chair (including a wheelchair)?  3  -NK     Standing up  from a chair using your arms (e.g., wheelchair, bedside chair)?  3  -NK     Climbing 3-5 steps with a railing?  2  -NK     To walk in hospital room?  2  -NK     AM-PAC 6 Clicks Score (PT)  16  -NK     Row Name 09/10/21 1119          Functional Assessment    Outcome Measure Options  AM-PAC 6 Clicks Basic Mobility (PT)  -NK       User Key  (r) = Recorded By, (t) = Taken By, (c) = Cosigned By    Initials Name Provider Type    Marisol Mccarthy, PT Student PT Student                       Physical Therapy Education                 Title: PT OT SLP Therapies (Resolved)     Topic: Physical Therapy (Resolved)     Point: Mobility training (Resolved)     Learner Progress:  Not documented in this visit.                          PT Recommendation and Plan  Planned Therapy Interventions (PT): balance training, bed mobility training, gait training, strengthening  Plan of Care Reviewed With: patient  Progress: improving  Outcome Summary: Pt is an 89 y.o. female admitted to [facility] for R foot pain, XR (-) fracture. Pt PMH includes liver disease, CKD, HTN and immobility. Pt had been living at [facility], assisted living [facility], however they are unable to care for her current level of assist. Pt has had recent hospital admit and subsequent rehab stay, states she had been independent with ADLs however has noticed decrease in ambulation ability and could only walk short distances with rolling walker. Pt was oriented x4 this date and presented with global weakness (3+/5) as shown in functional abilities. Pt able to complete functional transfer to  with min A with verbal cueing for pushing off of bed.  Pt able to ambulate 20 feet with RW and min A, noted significantly decreased gait speed and pain in B knees d/t arthritis. Noted some swelling in L UE possibly d/t IV. Therapist removed patient’s watch to prevent decreased circulation and notified nursing staff. Pt stated she would like to return to [facility] but will need to get  stronger first. Recommending IP rehab d/t significant decline from PLOF and inability to care for self at this time.     Time Calculation:   PT Charges     Row Name 09/10/21 1122             Time Calculation    Start Time  0931  -NK      Stop Time  1012  -NK      Time Calculation (min)  41 min  -NK      PT Received On  09/10/21  -NK      PT - Next Appointment  09/12/21  -NK      PT Goal Re-Cert Due Date  09/24/21  -NK         Time Calculation- PT    Total Timed Code Minutes- PT  8 minute(s)  -NK        User Key  (r) = Recorded By, (t) = Taken By, (c) = Cosigned By    Initials Name Provider Type    NK Cynthia Shay, PT Student PT Student        Therapy Charges for Today     Code Description Service Date Service Provider Modifiers Qty    [id number] HC-PT EVAL MOD COMPLEXITY 5 9/10/2021 Cynthia Shay, PT Student  2    [id number]  PT THERAPEUTIC ACT EA 15 MIN 9/10/2021 Cynthia Shay, PT Student GP 1          PT G-Codes  Outcome Measure Options: AM-PAC 6 Clicks Basic Mobility (PT)  AM-PAC 6 Clicks Score (PT): 16    CYNTHIA SHAY PT Student  9/10/2021

## 2021-09-10 NOTE — PLAN OF CARE
Problem: Adult Inpatient Plan of Care  Goal: Plan of Care Review  Outcome: Ongoing, Progressing  Flowsheets (Taken 9/10/2021 1221)  Plan of Care Reviewed With: patient  Goal: Patient-Specific Goal (Individualized)  Outcome: Ongoing, Progressing  Goal: Absence of Hospital-Acquired Illness or Injury  Outcome: Ongoing, Progressing  Intervention: Identify and Manage Fall Risk  Recent Flowsheet Documentation  Taken 9/10/2021 1138 by Bhumika Estrella RN  Safety Promotion/Fall Prevention:   safety round/check completed   nonskid shoes/slippers when out of bed   fall prevention program maintained  Taken 9/10/2021 0920 by Bhumika Estrella RN  Safety Promotion/Fall Prevention:   safety round/check completed   nonskid shoes/slippers when out of bed   fall prevention program maintained  Taken 9/10/2021 0750 by Bhumika Estrella RN  Safety Promotion/Fall Prevention:   safety round/check completed   nonskid shoes/slippers when out of bed   fall prevention program maintained  Intervention: Prevent Skin Injury  Recent Flowsheet Documentation  Taken 9/10/2021 0750 by Bhumika Estrella RN  Body Position: position maintained  Skin Protection: tubing/devices free from skin contact  Intervention: Prevent Infection  Recent Flowsheet Documentation  Taken 9/10/2021 1138 by Bhumika Estrella RN  Infection Prevention:   single patient room provided   rest/sleep promoted   hand hygiene promoted  Taken 9/10/2021 0920 by Bhumika Estrella RN  Infection Prevention:   single patient room provided   rest/sleep promoted   hand hygiene promoted  Taken 9/10/2021 0750 by Bhumika Estrella RN  Infection Prevention:   single patient room provided   rest/sleep promoted   hand hygiene promoted  Goal: Optimal Comfort and Wellbeing  Outcome: Ongoing, Progressing  Intervention: Provide Person-Centered Care  Recent Flowsheet Documentation  Taken 9/10/2021 0750 by Bhumika Estrella RN  Trust Relationship/Rapport:   care explained    thoughts/feelings acknowledged  Goal: Readiness for Transition of Care  Outcome: Ongoing, Progressing     Problem: Fall Injury Risk  Goal: Absence of Fall and Fall-Related Injury  Outcome: Ongoing, Progressing  Intervention: Identify and Manage Contributors to Fall Injury Risk  Recent Flowsheet Documentation  Taken 9/10/2021 1138 by Bhumika Estrella RN  Medication Review/Management: medications reviewed  Taken 9/10/2021 0920 by Bhumika Estrella RN  Medication Review/Management: medications reviewed  Taken 9/10/2021 0750 by Bhumika Estrella RN  Medication Review/Management: medications reviewed  Intervention: Promote Injury-Free Environment  Recent Flowsheet Documentation  Taken 9/10/2021 1138 by Bhumika Estrella RN  Safety Promotion/Fall Prevention:   safety round/check completed   nonskid shoes/slippers when out of bed   fall prevention program maintained  Taken 9/10/2021 0920 by Bhumika Estrella RN  Safety Promotion/Fall Prevention:   safety round/check completed   nonskid shoes/slippers when out of bed   fall prevention program maintained  Taken 9/10/2021 0750 by Bhumika Estrella RN  Safety Promotion/Fall Prevention:   safety round/check completed   nonskid shoes/slippers when out of bed   fall prevention program maintained     Problem: Skin Injury Risk Increased  Goal: Skin Health and Integrity  Outcome: Ongoing, Progressing  Intervention: Optimize Skin Protection  Recent Flowsheet Documentation  Taken 9/10/2021 0750 by Bhumika Estrella RN  Pressure Reduction Techniques:   frequent weight shift encouraged   heels elevated off bed  Head of Bed (HOB): HOB elevated  Pressure Reduction Devices: pressure-redistributing mattress utilized  Skin Protection: tubing/devices free from skin contact     Problem: Pain Acute  Goal: Optimal Pain Control  Outcome: Ongoing, Progressing  Intervention: Optimize Psychosocial Wellbeing  Recent Flowsheet Documentation  Taken 9/10/2021 0750 by Bhumika Estrella  RN  Supportive Measures: active listening utilized  Diversional Activities: television     Problem: Balance Impairment (Functional Deficit)  Goal: Improved Balance and Postural Control  Outcome: Ongoing, Progressing  Intervention: Optimize Balance and Safe Activity  Recent Flowsheet Documentation  Taken 9/10/2021 1138 by Bhumika Estrella RN  Safety Promotion/Fall Prevention:   safety round/check completed   nonskid shoes/slippers when out of bed   fall prevention program maintained  Taken 9/10/2021 0920 by Bhumika Estrella RN  Safety Promotion/Fall Prevention:   safety round/check completed   nonskid shoes/slippers when out of bed   fall prevention program maintained  Taken 9/10/2021 0750 by Bhumika Estrella RN  Safety Promotion/Fall Prevention:   safety round/check completed   nonskid shoes/slippers when out of bed   fall prevention program maintained     Problem: Cognitive Impairment (Functional Deficit)  Goal: Optimal Functional [place]  Outcome: Ongoing, Progressing  Intervention: Optimize Cognitive Function  Recent Flowsheet Documentation  Taken 9/10/2021 0750 by Bhumika Estrella RN  Reorientation Measures: clock in view     Problem: Coordination Impairment (Functional Deficit)  Goal: Optimal Coordination  Outcome: Ongoing, Progressing     Problem: Muscle Strength Impairment (Functional Deficit)  Goal: Improved Muscle Strength  Outcome: Ongoing, Progressing     Problem: Muscle Tone Impairment (Functional Deficit)  Goal: Improved Muscle Tone  Outcome: Ongoing, Progressing     Problem: Range of Motion Impairment (Functional Deficit)  Goal: Optimal Range of Motion  Outcome: Ongoing, Progressing  Intervention: Maintain Functional Joint Range and Position  Recent Flowsheet Documentation  Taken 9/10/2021 0750 by Bhumika Estrella RN  Positioning/Transfer Devices:   pillows   in use     Problem: Sensory Impairment (Functional Deficit)  Goal: Compensation for Sensory Deficit  Outcome: Ongoing,  Progressing  Intervention: Prevent Injury Related to Sensory Impairment  Recent Flowsheet Documentation  Taken 9/10/2021 0750 by Bhumika Estrella, RN  Pressure Reduction Devices: pressure-redistributing mattress utilized  Skin Protection: tubing/devices free from skin contact     Problem: Hypertension Comorbidity  Goal: Blood Pressure in Desired Range  Outcome: Ongoing, Progressing  Intervention: Maintain Hypertension-Management Strategies  Recent Flowsheet Documentation  Taken 9/10/2021 1138 by Bhumika Estrella, RN  Medication Review/Management: medications reviewed  Taken 9/10/2021 0920 by Bhumika Estrella RN  Medication Review/Management: medications reviewed  Taken 9/10/2021 0750 by Bhumika Estrella, RN  Medication Review/Management: medications reviewed   Goal Outcome Evaluation:  Plan of Care Reviewed With: patient         Patient rested throughout the shift. Potassium and magnesium are being replaced per protocol. Plan is to discharge to [facility] today. Will continue to observe.

## 2021-09-10 NOTE — H&P
[facility] Medicine Services      Patient Name: Sarah Zendejas  : 1931  MRN: [id number]  Primary Care Physician:  Victoria Naik  Date of admission: 2021      Subjective      Chief Complaint: Foot pain    History of Present Illness: 89-year-old multiple medical problems recently hospitalized at our facility discharged to SNF.  She was transferred to another rehab facility for unclear to me reasons, either way patient was found to require more needs than that facility could offer and she was subsequently transferred to our ER.  She was in SNF due to deconditioning and does need a lot of PT and OT.  Her only complaint right now was right foot pain.  She has no swelling no erythema no fevers no chills associated with it.  There is been no trauma.  She can bear weight on it.    9/10/2021 seen and examined at bedside.  Continues to be asymptomatic no complaints    ER x-ray of right foot with no acute findings, labs pending  Review of systems  General no fevers no chills  Neuro no focal weakness no focal numbness  Cardiovascular no chest pain no palpitations  Pulmonary no shortness of breath no cough  Abdomen no pain no nausea no vomiting no blood per any orifice  Urinary no dysuria no polyuria positive for urinary incontinence  Muscleskeletal right foot pain, no muscle pain  Skin no rashes    Personal History     Past Medical History:   Diagnosis Date   • Allergic rhinitis    • Anemia    • Bipolar 1 disorder (CMS/HCC)    • Broken bones     cheek bone   • Bursitis of knee    • Calculus of bile duct    • Chronic kidney disease     STAGE 3   • Colon polyps    • Depression    • Dysuria    • Fundic gland polyposis of stomach 2014   • Gallstones 2014    gb sono-stone    • GERD (gastroesophageal reflux disease)    • H/O complete eye exam    • H/O mammogram    • History of dental examination 2016   • Hyperlipidemia    • Hypertension    • Infectious viral hepatitis    •  Non-alcoholic fatty liver disease    • Polyp of stomach    • Spinal stenosis    • UTI (urinary tract infection)        Past Surgical History:   Procedure Laterality Date   • BILATERAL BREAST REDUCTION     • CARDIAC CATHETERIZATION     • COLON SURGERY  08/2014    TUBULAR ADENOMA RESECTED   • COLONOSCOPY  2013   • ENDOSCOPY  08/2014    (Dr Huffman)   • ESOPHAGEAL DILATATION     • OTHER SURGICAL HISTORY  08/2014    bx stomach polyps and colon polyps       Family History: family history includes Adrenal disorder in an other family member; Arthritis in her brother and father; Brain cancer in an other family member; Breast cancer in an other family member; Cancer in her mother, sister, and sister; Colon cancer in her mother and sister; Diabetes in her brother; Heart disease in her sister; Hypertension in her brother and father; Ovarian cancer in her sister; Stroke in her father. Otherwise pertinent FHx was reviewed and not pertinent to current issue.    Social History:  reports that she has quit smoking. She has never used smokeless tobacco. She reports that she does not drink alcohol and does not use drugs.    Home Medications:  Prior to Admission Medications     Prescriptions Last Dose Informant Patient Reported? Taking?    acetaminophen (TYLENOL) 325 MG tablet   Yes No    Take 650 mg by mouth 2 (Two) Times a Day As Needed for Mild Pain .    ARIPiprazole (ABILIFY) 2 MG tablet   Yes No    Take 2 mg by mouth Every Night.    atorvastatin (LIPITOR) 80 MG tablet   No No    Take 1 tablet by mouth Every Night.    benzonatate (TESSALON) 100 MG capsule   Yes No    Take 100 mg by mouth Every 8 (Eight) Hours As Needed for Cough.    calcium carb-cholecalciferol (Calcium 600+D3) 600-800 MG-UNIT tablet   Yes No    Take 1 tablet by mouth 2 (two) times a day.    cephalexin (Keflex) 500 MG capsule   No No    Take 1 capsule by mouth 2 (Two) Times a Day.    divalproex (DEPAKOTE) 125 MG DR tablet   Yes No    Take 125 mg by mouth 2 (Two)  Times a Day.    ezetimibe (Zetia) 10 MG tablet   Yes No    Take 10 mg by mouth Daily.    FLUoxetine (PROzac) 40 MG capsule   Yes No    Take 40 mg by mouth Every Night.    fluticasone (FLONASE) 50 MCG/ACT nasal spray   Yes No    2 sprays by Each Nare route Daily As Needed.    furosemide (LASIX) 40 MG tablet   Yes No    Take 40 mg by mouth Daily.    HYDROcodone-acetaminophen (NORCO) 5-325 MG per tablet   Yes No    Take 1 tablet by mouth 3 (Three) Times a Day.    loperamide (Loperamide A-D) 2 MG tablet   Yes No    Take 2 mg by mouth Every 6 (Six) Hours As Needed for Diarrhea.    Menthol, Topical Analgesic, (Biofreeze) 4 % gel   Yes No    Apply 1 application topically 3 (Three) Times a Day As Needed. Apply to neck    metOLazone (ZAROXOLYN) 2.5 MG tablet   Yes No    Take 2.5 mg by mouth Daily.    metOLazone (ZAROXOLYN) 5 MG tablet   Yes No    Take 5 mg by mouth Daily.    Mirabegron ER (MYRBETRIQ) 50 MG tablet sustained-release 24 hour 24 hr tablet   Yes No    Take 50 mg by mouth Daily.    omeprazole (priLOSEC) 20 MG capsule   No No    TAKE ONE CAPSULE BY MOUTH EVERY DAY    ondansetron (ZOFRAN) 4 MG tablet   Yes No    Take 4 mg by mouth Every 6 (Six) Hours As Needed for Nausea or Vomiting.    polyethylene glycol (MIRALAX) 17 GM/SCOOP powder   Yes No    Take 15 g by mouth Daily As Needed.    polyvinyl alcohol (Artificial Tears) 1.4 % ophthalmic solution   Yes No    Administer 1 drop to both eyes Every 4 (Four) Hours As Needed for Dry Eyes.    potassium chloride (K-DUR,KLOR-CON) 10 MEQ CR tablet   Yes No    Take 10 mEq by mouth 2 (Two) Times a Day.    topiramate (TOPAMAX) 50 MG tablet   Yes No    Take 150 mg by mouth every night at bedtime.    traZODone (DESYREL) 50 MG tablet   Yes No    Take 25-50 mg by mouth Every Night.            Allergies:  Allergies   Allergen Reactions   • Morphine Other (See Comments)     Mood changes   • Nsaids    • Sulfa Antibiotics    • Vancomycin Rash       Objective      Vitals:   Temp:  [98.3  °F (36.8 °C)-99.8 °F (37.7 °C)] 98.8 °F (37.1 °C)  Heart Rate:  [60-68] 60  Resp:  [12-20] 12  BP: (102-120)/(59-68) 102/65    Physical Exam   Constitutional:  oriented to person, place, and time. No distress.   HENT:   Head: Normocephalic and atraumatic.   Eyes: Conjunctivae and EOM are normal. Pupils are equal, round, and reactive to light.   Neck: No JVD present. No thyromegaly present.   Cardiovascular: Normal rate, regular rhythm, normal heart sounds and intact distal pulses. Exam reveals no gallop and no friction rub.   No murmur heard.  Pulmonary/Chest: Effort normal and breath sounds normal. No stridor. No respiratory distress.  has no wheezes.  has no rales.  exhibits no tenderness.   Abdominal: Soft. Bowel sounds are normal.  no distension and no mass. There is no tenderness. There is no rebound and no guarding. No hernia.   Musculoskeletal: Normal range of motion.  Right lower extremity no swelling no edema no erythema no ecchymosis full range of motion no tenderness  Lymphadenopathy:     no cervical adenopathy.   Neurological:  alert and oriented to person, place, and time. No cranial nerve deficit or sensory deficit. exhibits normal muscle tone.   Skin: No rash noted.  not diaphoretic.   Psychiatric:  normal mood and affect.   Vitals reviewed.      Result Review    Result Review:  I have personally reviewed the results from the time of this admission to 9/10/2021 11:12 EDT and agree with these findings:  []  Laboratory  []  Microbiology  [x]  Radiology  []  EKG/Telemetry   []  Cardiology/Vascular   []  Pathology  []  Old records  []  Other:  Most notable findings include: Normal x-ray of right foot    Assessment/Plan        Active Hospital Problems:  Active Hospital Problems    Diagnosis    • Immobility      Plan:   Right foot pain  Resolved  X-ray negative, possibly arthritis  Pain management now    Deconditioning  Social work case management consult for placement  PT OT    Psych unspecified  Restarted  home meds Abilify, Depakote, Prozac, trazodone,    Chronic lymphadenopathy  Restarted home diuretics    Hypokalemia  Restart p.o. potassium scheduled and replace otherwise per protocol as needed    Headaches  Continue Topamax    Urinary incontinence  Continue oxybutynin    Cellulitis  Finished course of antibiotic outpatient    DVT PUD prophylaxis    Plan as above        DVT prophylaxis:  Medical DVT prophylaxis orders are present.    CODE STATUS:    Level Of Support Discussed With: Patient  Code Status: CPR  Medical Interventions (Level of Support Prior to Arrest): Full    Admission Status:  I believe this patient meets observation status.    I discussed the patient's findings and my recommendations with patient.    Signature:   Electronically signed by Nahomy Betts MD, 09/09/21, 5:29 PM EDT.

## 2021-09-10 NOTE — PLAN OF CARE
Goal Outcome Evaluation:  Plan of Care Reviewed With: patient        Progress: no change  Outcome Summary: pt rested since arrival to the floor

## 2021-09-13 NOTE — CASE MANAGEMENT/SOCIAL WORK
Case Management Discharge Note      [facility]         Selected Continued Care - Discharged on 9/10/2021 Admission date: 9/9/2021 - Discharge disposition: Skilled Nursing Facility (DC - External)    Destination Coordination complete.    Service Provider Selected Services Address Phone Fax Patient Preferred    [facility] [place]  Skilled Nursing [address] [place] [address] [phone] --                                      Home Medical Care Coordination complete.    Service Provider Selected Services Address Phone Fax Patient Preferred    [address] [place] [address] [phone] -- --                Selected Continued Care - Prior Encounters Includes selections from prior encounters from 6/11/2021 to 9/10/2021    Discharged on 8/24/2021 Admission date: 8/18/2021 - Discharge disposition: Rehab Facility or Unit (DC - External)    Home Medical Care     Service Provider Selected Services Address Phone Fax Patient Preferred    [address] [place] [address] [phone] -- --                         Final Discharge Disposition Code: 03 - skilled nursing facility (SNF)

## 2024-01-15 ENCOUNTER — LAB REQUISITION (OUTPATIENT)
Dept: LAB | Facility: HOSPITAL | Age: 89
End: 2024-01-15
Payer: MEDICAID

## 2024-01-15 DIAGNOSIS — R05.9 COUGH, UNSPECIFIED: ICD-10-CM

## 2024-01-15 LAB
B PARAPERT DNA SPEC QL NAA+PROBE: NOT DETECTED
B PERT DNA SPEC QL NAA+PROBE: NOT DETECTED
C PNEUM DNA NPH QL NAA+NON-PROBE: NOT DETECTED
FLUAV SUBTYP SPEC NAA+PROBE: NOT DETECTED
FLUBV RNA ISLT QL NAA+PROBE: NOT DETECTED
HADV DNA SPEC NAA+PROBE: NOT DETECTED
HCOV 229E RNA SPEC QL NAA+PROBE: NOT DETECTED
HCOV HKU1 RNA SPEC QL NAA+PROBE: NOT DETECTED
HCOV NL63 RNA SPEC QL NAA+PROBE: NOT DETECTED
HCOV OC43 RNA SPEC QL NAA+PROBE: NOT DETECTED
HMPV RNA NPH QL NAA+NON-PROBE: NOT DETECTED
HPIV1 RNA ISLT QL NAA+PROBE: NOT DETECTED
HPIV2 RNA SPEC QL NAA+PROBE: NOT DETECTED
HPIV3 RNA NPH QL NAA+PROBE: NOT DETECTED
HPIV4 P GENE NPH QL NAA+PROBE: NOT DETECTED
M PNEUMO IGG SER IA-ACNC: NOT DETECTED
RHINOVIRUS RNA SPEC NAA+PROBE: NOT DETECTED
RSV RNA NPH QL NAA+NON-PROBE: DETECTED
SARS-COV-2 RNA NPH QL NAA+NON-PROBE: NOT DETECTED

## 2024-01-15 PROCEDURE — 0202U NFCT DS 22 TRGT SARS-COV-2: CPT | Performed by: FAMILY MEDICINE

## 2025-02-13 ENCOUNTER — LAB REQUISITION (OUTPATIENT)
Dept: LAB | Facility: HOSPITAL | Age: OVER 89
End: 2025-02-13
Payer: MEDICARE

## 2025-02-13 DIAGNOSIS — R53.82 CHRONIC FATIGUE, UNSPECIFIED: ICD-10-CM

## 2025-02-13 DIAGNOSIS — R05.9 COUGH, UNSPECIFIED: ICD-10-CM

## 2025-02-13 DIAGNOSIS — R50.9 FEVER, UNSPECIFIED: ICD-10-CM

## 2025-02-13 LAB
B PARAPERT DNA SPEC QL NAA+PROBE: NOT DETECTED
B PERT DNA SPEC QL NAA+PROBE: NOT DETECTED
C PNEUM DNA NPH QL NAA+NON-PROBE: NOT DETECTED
FLUAV SUBTYP SPEC NAA+PROBE: NOT DETECTED
FLUBV RNA ISLT QL NAA+PROBE: NOT DETECTED
HADV DNA SPEC NAA+PROBE: NOT DETECTED
HCOV 229E RNA SPEC QL NAA+PROBE: NOT DETECTED
HCOV HKU1 RNA SPEC QL NAA+PROBE: NOT DETECTED
HCOV NL63 RNA SPEC QL NAA+PROBE: NOT DETECTED
HCOV OC43 RNA SPEC QL NAA+PROBE: NOT DETECTED
HMPV RNA NPH QL NAA+NON-PROBE: NOT DETECTED
HPIV1 RNA ISLT QL NAA+PROBE: NOT DETECTED
HPIV2 RNA SPEC QL NAA+PROBE: NOT DETECTED
HPIV3 RNA NPH QL NAA+PROBE: NOT DETECTED
HPIV4 P GENE NPH QL NAA+PROBE: NOT DETECTED
M PNEUMO IGG SER IA-ACNC: NOT DETECTED
RHINOVIRUS RNA SPEC NAA+PROBE: NOT DETECTED
RSV RNA NPH QL NAA+NON-PROBE: NOT DETECTED
SARS-COV-2 RNA RESP QL NAA+PROBE: NOT DETECTED

## 2025-02-13 PROCEDURE — 0202U NFCT DS 22 TRGT SARS-COV-2: CPT | Performed by: FAMILY MEDICINE

## 2025-04-07 ENCOUNTER — LAB REQUISITION (OUTPATIENT)
Dept: LAB | Facility: HOSPITAL | Age: OVER 89
End: 2025-04-07
Payer: MEDICAID

## 2025-04-07 DIAGNOSIS — R52 PAIN, UNSPECIFIED: ICD-10-CM

## 2025-04-07 DIAGNOSIS — N39.0 URINARY TRACT INFECTION, SITE NOT SPECIFIED: ICD-10-CM

## 2025-04-07 DIAGNOSIS — J30.9 ALLERGIC RHINITIS, UNSPECIFIED: ICD-10-CM

## 2025-04-07 LAB
B PARAPERT DNA SPEC QL NAA+PROBE: NOT DETECTED
B PERT DNA SPEC QL NAA+PROBE: NOT DETECTED
C PNEUM DNA NPH QL NAA+NON-PROBE: NOT DETECTED
FLUAV SUBTYP SPEC NAA+PROBE: NOT DETECTED
FLUBV RNA ISLT QL NAA+PROBE: NOT DETECTED
HADV DNA SPEC NAA+PROBE: NOT DETECTED
HCOV 229E RNA SPEC QL NAA+PROBE: NOT DETECTED
HCOV HKU1 RNA SPEC QL NAA+PROBE: NOT DETECTED
HCOV NL63 RNA SPEC QL NAA+PROBE: NOT DETECTED
HCOV OC43 RNA SPEC QL NAA+PROBE: NOT DETECTED
HMPV RNA NPH QL NAA+NON-PROBE: NOT DETECTED
HPIV1 RNA ISLT QL NAA+PROBE: NOT DETECTED
HPIV2 RNA SPEC QL NAA+PROBE: NOT DETECTED
HPIV3 RNA NPH QL NAA+PROBE: NOT DETECTED
HPIV4 P GENE NPH QL NAA+PROBE: NOT DETECTED
M PNEUMO IGG SER IA-ACNC: NOT DETECTED
RHINOVIRUS RNA SPEC NAA+PROBE: DETECTED
RSV RNA NPH QL NAA+NON-PROBE: NOT DETECTED
SARS-COV-2 RNA RESP QL NAA+PROBE: NOT DETECTED

## 2025-04-07 PROCEDURE — 0202U NFCT DS 22 TRGT SARS-COV-2: CPT | Performed by: FAMILY MEDICINE

## 2025-05-01 ENCOUNTER — LAB REQUISITION (OUTPATIENT)
Dept: LAB | Facility: HOSPITAL | Age: OVER 89
End: 2025-05-01
Payer: MEDICAID

## 2025-05-01 DIAGNOSIS — R05.9 COUGH, UNSPECIFIED: ICD-10-CM

## 2025-05-01 LAB
B PARAPERT DNA SPEC QL NAA+PROBE: NOT DETECTED
B PERT DNA SPEC QL NAA+PROBE: NOT DETECTED
C PNEUM DNA NPH QL NAA+NON-PROBE: NOT DETECTED
FLUAV SUBTYP SPEC NAA+PROBE: NOT DETECTED
FLUBV RNA ISLT QL NAA+PROBE: NOT DETECTED
HADV DNA SPEC NAA+PROBE: NOT DETECTED
HCOV 229E RNA SPEC QL NAA+PROBE: NOT DETECTED
HCOV HKU1 RNA SPEC QL NAA+PROBE: NOT DETECTED
HCOV NL63 RNA SPEC QL NAA+PROBE: NOT DETECTED
HCOV OC43 RNA SPEC QL NAA+PROBE: NOT DETECTED
HMPV RNA NPH QL NAA+NON-PROBE: DETECTED
HPIV1 RNA ISLT QL NAA+PROBE: NOT DETECTED
HPIV2 RNA SPEC QL NAA+PROBE: NOT DETECTED
HPIV3 RNA NPH QL NAA+PROBE: NOT DETECTED
HPIV4 P GENE NPH QL NAA+PROBE: NOT DETECTED
M PNEUMO IGG SER IA-ACNC: NOT DETECTED
RHINOVIRUS RNA SPEC NAA+PROBE: NOT DETECTED
RSV RNA NPH QL NAA+NON-PROBE: NOT DETECTED
SARS-COV-2 RNA RESP QL NAA+PROBE: NOT DETECTED

## 2025-05-01 PROCEDURE — 0202U NFCT DS 22 TRGT SARS-COV-2: CPT | Performed by: INTERNAL MEDICINE
